# Patient Record
Sex: MALE | HISPANIC OR LATINO | Employment: FULL TIME | URBAN - METROPOLITAN AREA
[De-identification: names, ages, dates, MRNs, and addresses within clinical notes are randomized per-mention and may not be internally consistent; named-entity substitution may affect disease eponyms.]

---

## 2022-04-07 ENCOUNTER — OFFICE VISIT (OUTPATIENT)
Dept: FAMILY MEDICINE | Facility: CLINIC | Age: 57
End: 2022-04-07
Payer: COMMERCIAL

## 2022-04-07 VITALS
HEART RATE: 65 BPM | DIASTOLIC BLOOD PRESSURE: 98 MMHG | HEIGHT: 67 IN | WEIGHT: 255 LBS | BODY MASS INDEX: 40.02 KG/M2 | RESPIRATION RATE: 16 BRPM | SYSTOLIC BLOOD PRESSURE: 146 MMHG | OXYGEN SATURATION: 98 % | TEMPERATURE: 97 F

## 2022-04-07 DIAGNOSIS — E66.01 MORBID OBESITY (H): ICD-10-CM

## 2022-04-07 DIAGNOSIS — Z01.818 PREOPERATIVE EXAMINATION: ICD-10-CM

## 2022-04-07 DIAGNOSIS — Z13.6 CARDIOVASCULAR SCREENING; LDL GOAL LESS THAN 160: ICD-10-CM

## 2022-04-07 DIAGNOSIS — I10 BENIGN ESSENTIAL HYPERTENSION: ICD-10-CM

## 2022-04-07 DIAGNOSIS — E89.0 S/P COMPLETE THYROIDECTOMY: ICD-10-CM

## 2022-04-07 DIAGNOSIS — Z11.59 ENCOUNTER FOR HEPATITIS C SCREENING TEST FOR LOW RISK PATIENT: ICD-10-CM

## 2022-04-07 DIAGNOSIS — G47.33 OSA (OBSTRUCTIVE SLEEP APNEA): ICD-10-CM

## 2022-04-07 DIAGNOSIS — M17.32 POST-TRAUMATIC OSTEOARTHRITIS OF LEFT KNEE: Primary | ICD-10-CM

## 2022-04-07 PROBLEM — Z98.890 S/P COMPLETE THYROIDECTOMY: Status: ACTIVE | Noted: 2022-04-07

## 2022-04-07 PROBLEM — Z90.89 S/P COMPLETE THYROIDECTOMY: Status: ACTIVE | Noted: 2022-04-07

## 2022-04-07 LAB
ERYTHROCYTE [DISTWIDTH] IN BLOOD BY AUTOMATED COUNT: 14.4 % (ref 10–15)
HCT VFR BLD AUTO: 46.9 % (ref 40–53)
HGB BLD-MCNC: 14.8 G/DL (ref 13.3–17.7)
MCH RBC QN AUTO: 27 PG (ref 26.5–33)
MCHC RBC AUTO-ENTMCNC: 31.6 G/DL (ref 31.5–36.5)
MCV RBC AUTO: 86 FL (ref 78–100)
PLATELET # BLD AUTO: 163 10E3/UL (ref 150–450)
RBC # BLD AUTO: 5.48 10E6/UL (ref 4.4–5.9)
WBC # BLD AUTO: 7.6 10E3/UL (ref 4–11)

## 2022-04-07 PROCEDURE — 80061 LIPID PANEL: CPT | Performed by: FAMILY MEDICINE

## 2022-04-07 PROCEDURE — 84439 ASSAY OF FREE THYROXINE: CPT | Performed by: FAMILY MEDICINE

## 2022-04-07 PROCEDURE — 85027 COMPLETE CBC AUTOMATED: CPT | Performed by: FAMILY MEDICINE

## 2022-04-07 PROCEDURE — 84443 ASSAY THYROID STIM HORMONE: CPT | Performed by: FAMILY MEDICINE

## 2022-04-07 PROCEDURE — 80048 BASIC METABOLIC PNL TOTAL CA: CPT | Performed by: FAMILY MEDICINE

## 2022-04-07 PROCEDURE — 36415 COLL VENOUS BLD VENIPUNCTURE: CPT | Performed by: FAMILY MEDICINE

## 2022-04-07 PROCEDURE — 99204 OFFICE O/P NEW MOD 45 MIN: CPT | Performed by: FAMILY MEDICINE

## 2022-04-07 PROCEDURE — 86803 HEPATITIS C AB TEST: CPT | Performed by: FAMILY MEDICINE

## 2022-04-07 RX ORDER — CYCLOBENZAPRINE HCL 10 MG
10 TABLET ORAL PRN
COMMUNITY
Start: 2021-11-01 | End: 2022-04-07

## 2022-04-07 RX ORDER — LISINOPRIL 20 MG/1
20 TABLET ORAL DAILY
Qty: 30 TABLET | Refills: 1 | Status: SHIPPED | OUTPATIENT
Start: 2022-04-07 | End: 2022-04-15

## 2022-04-07 ASSESSMENT — PAIN SCALES - GENERAL: PAINLEVEL: EXTREME PAIN (8)

## 2022-04-07 NOTE — LETTER
April 11, 2022      Johan Cole  [address] [facility] [address]        Dear ,    We are writing to inform you of your test results.    Not surprisingly your thyroid levels are off.  When we see you back we can talk about restarting your medication.  Your cholesterol is also high enough that we should talk about that.     Let me know if you have any questions,        Resulted Orders   CBC with platelets   Result Value Ref Range    WBC Count 7.6 4.0 - 11.0 10e3/uL    RBC Count 5.48 4.40 - 5.90 10e6/uL    Hemoglobin 14.8 13.3 - 17.7 g/dL    Hematocrit 46.9 40.0 - 53.0 %    MCV 86 78 - 100 fL    MCH 27.0 26.5 - 33.0 pg    MCHC 31.6 31.5 - 36.5 g/dL    RDW 14.4 10.0 - 15.0 %    Platelet Count 163 150 - 450 10e3/uL   Basic metabolic panel  (Ca, Cl, CO2, Creat, Gluc, K, Na, BUN)   Result Value Ref Range    Sodium 141 133 - 144 mmol/L    Potassium 4.2 3.4 - 5.3 mmol/L    Chloride 115 (H) 94 - 109 mmol/L    Carbon Dioxide (CO2) 18 (L) 20 - 32 mmol/L    Anion Gap 8 3 - 14 mmol/L    Urea Nitrogen 16 7 - 30 mg/dL    Creatinine 0.87 0.66 - 1.25 mg/dL    Calcium 9.0 8.5 - 10.1 mg/dL    Glucose 111 (H) 70 - 99 mg/dL    GFR Estimate >90 >60 mL/min/1.73m2      Comment:      Effective December 21, 2021 eGFRcr in adults is calculated using the 2021 CKD-EPI creatinine equation which includes age and gender (Maddie et al., NEJM, DOI: 10.1056/VQNYiu2345124)   Lipid panel reflex to direct LDL Fasting   Result Value Ref Range    Cholesterol 295 (H) <200 mg/dL    Triglycerides 337 (H) <150 mg/dL    Direct Measure HDL 29 (L) >=40 mg/dL    LDL Cholesterol Calculated 199 (H) <=100 mg/dL    Non HDL Cholesterol 266 (H) <130 mg/dL    Patient Fasting > 8hrs? Yes     Narrative    Cholesterol  Desirable:  <200 mg/dL    Triglycerides  Normal:  Less than 150 mg/dL  Borderline High:  150-199 mg/dL  High:  200-499 mg/dL  Very High:  Greater than or equal to 500 mg/dL    Direct Measure HDL  Female:  Greater than or equal to 50 mg/dL   Male:   Greater than or equal to 40 mg/dL    LDL Cholesterol  Desirable:  <100mg/dL  Above Desirable:  100-129 mg/dL   Borderline High:  130-159 mg/dL   High:  160-189 mg/dL   Very High:  >= 190 mg/dL    Non HDL Cholesterol  Desirable:  130 mg/dL  Above Desirable:  130-159 mg/dL  Borderline High:  160-189 mg/dL  High:  190-219 mg/dL  Very High:  Greater than or equal to 220 mg/dL   Hepatitis C Screen Reflex to HCV RNA Quant and Genotype   Result Value Ref Range    Hepatitis C Antibody Nonreactive Nonreactive    Narrative    Assay performance characteristics have not been established for newborns, infants, and children.   TSH with free T4 reflex   Result Value Ref Range    TSH 26.47 (H) 0.40 - 4.00 mU/L   T4 free   Result Value Ref Range    Free T4 0.52 (L) 0.76 - 1.46 ng/dL       If you have any questions or concerns, please call the clinic at the number listed above.       Sincerely,      Ap Alvarez MD

## 2022-04-07 NOTE — PROGRESS NOTES
[facility]  [address] [facility] [address]  Phone: [phone]  Primary Provider: No Ref-Primary, Physician  Pre-op Performing Provider: AP ALVAREZ      PREOPERATIVE EVALUATION:  Today's date: 4/7/2022    Johan Cole is a 56 year old male who presents for a preoperative evaluation.    Surgical Information:  Surgery/Procedure: L knee replacement   Surgery Location: [facility]  Surgeon: Dr. Alex (Pt is not sure the spelling)   Surgery Date: 4/27/22  Time of Surgery: TBD  Where patient plans to recover: At home with family  Fax number for surgical facility:     Type of Anesthesia Anticipated: General    Assessment and Plan    (M17.32) Post-traumatic osteoarthritis of left knee  (primary encounter diagnosis)  Comment: cleared for surgery pending BP check  Plan:    ADDENDUM 4/18/21 - CLEARED FOR SURGERY     (Z13.6) CARDIOVASCULAR SCREENING; LDL GOAL LESS THAN 160  Comment: is fasting, establishing care  Plan: Lipid panel reflex to direct LDL Fasting            (Z11.59) Encounter for hepatitis C screening test for low risk patient  Comment:   Plan: Hepatitis C Screen Reflex to HCV RNA Quant and         Genotype            (Z01.818) Preoperative examination  Comment:   Plan: CBC with platelets, Basic metabolic panel  (Ca,        Cl, CO2, Creat, Gluc, K, Na, BUN)            (G47.33) DONA (obstructive sleep apnea)  Comment: noted for PL  Plan:     (E89.0) S/P complete thyroidectomy  Comment: PL updated, checking levels.  Will need to restart meds  Plan: TSH with free T4 reflex, T4 free            (I10) Benign essential hypertension  Comment: starting meds, f/u in 2w  Plan: lisinopril (ZESTRIL) 20 MG tablet            (E66.01) Morbid obesity (H)  Comment: noted for PL  Plan:       RTC in 2w    Ap Alvarez MD      Subjective     HPI related to upcoming procedure: Other the knee is feeling well.  No daily meds.  Feeling well other than his knee.  Is fasting.    Of  not, s/p thyroidectomy, has no tbeen on medication for thyroid in several years, no had his thyroid hormone levels checked.    Does also have history of HTN, had been started on medication, but again has not     Preop Questions 4/7/2022   1. Have you ever had a heart attack or stroke? No   2. Have you ever had surgery on your heart or blood vessels, such as a stent placement, a coronary artery bypass, or surgery on an artery in your head, neck, heart, or legs? No   3. Do you have chest pain with activity? No   4. Do you have a history of  heart failure? No   5. Do you currently have a cold, bronchitis or symptoms of other infection? No   6. Do you have a cough, shortness of breath, or wheezing? No   7. Do you or anyone in your family have previous history of blood clots? No   8. Do you or does anyone in your family have a serious bleeding problem such as prolonged bleeding following surgeries or cuts? No   9. Have you ever had problems with anemia or been told to take iron pills? No   10. Have you had any abnormal blood loss such as black, tarry or bloody stools? No   11. Have you ever had a blood transfusion? No   12. Are you willing to have a blood transfusion if it is medically needed before, during, or after your surgery? Yes   13. Have you or any of your relatives ever had problems with anesthesia? No   14. Do you have sleep apnea, excessive snoring or daytime drowsiness? YES -    14a. Do you have a CPAP machine? Yes   15. Do you have any artifical heart valves or other implanted medical devices like a pacemaker, defibrillator, or continuous glucose monitor? No   16. Do you have artificial joints? No   17. Are you allergic to latex? No       Health Care Directive:  Patient does not have a Health Care Directive or Living Will: Discussed advance care planning with patient; however, patient declined at this time.    Preoperative Review of :   reviewed - no record of controlled substances  prescribed.          Review of Systems  Constitutional, neuro, ENT, endocrine, pulmonary, cardiac, gastrointestinal, genitourinary, musculoskeletal, integument and psychiatric systems are negative, except as otherwise noted.    Patient Active Problem List    Diagnosis Date Noted     Post-traumatic osteoarthritis of left knee 04/07/2022     Priority: Medium     DONA (obstructive sleep apnea) 04/07/2022     Priority: Medium     S/P complete thyroidectomy 04/07/2022     Priority: Medium      History reviewed. No pertinent past medical history.  Past Surgical History:   Procedure Laterality Date     AS KNEE SCOPE,MED/LAT MENISCUS REPAIR       INGUINAL HERNIA REPAIR       THYROIDECTOMY   06/01/2005     TOTAL HIP ARTHROPLASTY  05/01/2015    Deosn't remember side     No current outpatient medications on file.       No Known Allergies     Social History     Tobacco Use     Smoking status: Never Smoker     Smokeless tobacco: Current User   Substance Use Topics     Alcohol use: Yes     Comment: Rare       History   Drug Use Unknown         Objective     BP (!) 146/98 (BP Location: Right arm, Patient Position: Sitting, Cuff Size: Adult Large)   Pulse 65   Temp 97  F (36.1  C) (Oral)   Resp 16   Wt 115.7 kg (255 lb)   SpO2 98%     Physical Exam  Constitutional:       General: He is not in acute distress.     Appearance: He is well-developed.   HENT:      Right Ear: Tympanic membrane and external ear normal. There is impacted cerumen.      Left Ear: Tympanic membrane and external ear normal. There is impacted cerumen.      Nose: Nose normal.      Mouth/Throat:      Mouth: No oral lesions.      Pharynx: No oropharyngeal exudate.   Eyes:      General:         Right eye: No discharge.         Left eye: No discharge.      Conjunctiva/sclera: Conjunctivae normal.      Pupils: Pupils are equal, round, and reactive to light.   Neck:      Thyroid: No thyromegaly.      Trachea: No tracheal deviation.   Cardiovascular:      Rate and  Rhythm: Normal rate and regular rhythm.      Pulses: Normal pulses.      Heart sounds: Normal heart sounds, S1 normal and S2 normal. No murmur heard.    No S3 or S4 sounds.   Pulmonary:      Effort: Pulmonary effort is normal. No respiratory distress.      Breath sounds: Normal breath sounds. No wheezing or rales.   Abdominal:      General: Bowel sounds are normal.      Palpations: Abdomen is soft. There is no mass.      Tenderness: There is no abdominal tenderness.   Musculoskeletal:         General: No deformity. Normal range of motion.      Cervical back: Neck supple.   Lymphadenopathy:      Cervical: No cervical adenopathy.   Skin:     General: Skin is warm and dry.      Findings: No lesion or rash.   Neurological:      Mental Status: He is alert and oriented to person, place, and time.      Motor: No abnormal muscle tone.      Deep Tendon Reflexes: Reflexes are normal and symmetric.   Psychiatric:         Speech: Speech normal.         Thought Content: Thought content normal.         Judgment: Judgment normal.           No results for input(s): HGB, PLT, INR, NA, POTASSIUM, CR, A1C in the last 04766 hours.     Diagnostics:  Labs pending at this time.  Results will be reviewed when available.   No EKG required, no history of coronary heart disease, significant arrhythmia, peripheral arterial disease or other structural heart disease.    Revised Cardiac Risk Index (RCRI):  The patient has the following serious cardiovascular risks for perioperative complications:   - No serious cardiac risks = 0 points     RCRI Interpretation: 0 points: Class I (very low risk - 0.4% complication rate)           Signed Electronically by: Ap Alvarez MD  Copy of this evaluation report is provided to requesting physician.

## 2022-04-08 LAB
ANION GAP SERPL CALCULATED.3IONS-SCNC: 8 MMOL/L (ref 3–14)
BUN SERPL-MCNC: 16 MG/DL (ref 7–30)
CALCIUM SERPL-MCNC: 9 MG/DL (ref 8.5–10.1)
CHLORIDE BLD-SCNC: 115 MMOL/L (ref 94–109)
CHOLEST SERPL-MCNC: 295 MG/DL
CO2 SERPL-SCNC: 18 MMOL/L (ref 20–32)
CREAT SERPL-MCNC: 0.87 MG/DL (ref 0.66–1.25)
FASTING STATUS PATIENT QL REPORTED: YES
GFR SERPL CREATININE-BSD FRML MDRD: >90 ML/MIN/1.73M2
GLUCOSE BLD-MCNC: 111 MG/DL (ref 70–99)
HCV AB SERPL QL IA: NONREACTIVE
HDLC SERPL-MCNC: 29 MG/DL
LDLC SERPL CALC-MCNC: 199 MG/DL
NONHDLC SERPL-MCNC: 266 MG/DL
POTASSIUM BLD-SCNC: 4.2 MMOL/L (ref 3.4–5.3)
SODIUM SERPL-SCNC: 141 MMOL/L (ref 133–144)
T4 FREE SERPL-MCNC: 0.52 NG/DL (ref 0.76–1.46)
TRIGL SERPL-MCNC: 337 MG/DL
TSH SERPL DL<=0.005 MIU/L-ACNC: 26.47 MU/L (ref 0.4–4)

## 2022-04-14 ENCOUNTER — TELEPHONE (OUTPATIENT)
Dept: FAMILY MEDICINE | Facility: CLINIC | Age: 57
End: 2022-04-14
Payer: COMMERCIAL

## 2022-04-14 NOTE — TELEPHONE ENCOUNTER
Reason for Call:  Same Day Appointment, Requested Provider:  anyone    PCP: Ap Alvarez    Reason for visit: Needs pre-op for procedure 4/27/22 - has appt set for 4/25/22 but doesn't have a ride anymore and needs to be seen before 4/27/22    Duration of symptoms: n/a    Have you been treated for this in the past? Yes    Additional comments: n/a    Can we leave a detailed message on this number? YES    Phone number patient can be reached at: Home number on file [phone] (home)    Best Time: anytime    Call taken on 4/14/2022 at 10:29 AM by Jennifer Willis

## 2022-04-15 ENCOUNTER — ANCILLARY PROCEDURE (OUTPATIENT)
Dept: GENERAL RADIOLOGY | Facility: CLINIC | Age: 57
End: 2022-04-15
Attending: FAMILY MEDICINE
Payer: COMMERCIAL

## 2022-04-15 ENCOUNTER — OFFICE VISIT (OUTPATIENT)
Dept: FAMILY MEDICINE | Facility: CLINIC | Age: 57
End: 2022-04-15

## 2022-04-15 VITALS
TEMPERATURE: 97 F | OXYGEN SATURATION: 97 % | RESPIRATION RATE: 16 BRPM | HEART RATE: 60 BPM | SYSTOLIC BLOOD PRESSURE: 126 MMHG | DIASTOLIC BLOOD PRESSURE: 84 MMHG

## 2022-04-15 DIAGNOSIS — M25.551 HIP PAIN, RIGHT: Primary | ICD-10-CM

## 2022-04-15 DIAGNOSIS — S14.3XXD INJURY OF BRACHIAL PLEXUS, SUBSEQUENT ENCOUNTER: ICD-10-CM

## 2022-04-15 DIAGNOSIS — Z12.11 SCREEN FOR COLON CANCER: ICD-10-CM

## 2022-04-15 DIAGNOSIS — E89.0 S/P COMPLETE THYROIDECTOMY: ICD-10-CM

## 2022-04-15 DIAGNOSIS — M25.551 HIP PAIN, RIGHT: ICD-10-CM

## 2022-04-15 DIAGNOSIS — Z01.818 PREOP GENERAL PHYSICAL EXAM: ICD-10-CM

## 2022-04-15 DIAGNOSIS — I10 BENIGN ESSENTIAL HYPERTENSION: ICD-10-CM

## 2022-04-15 PROCEDURE — 73502 X-RAY EXAM HIP UNI 2-3 VIEWS: CPT | Mod: FY | Performed by: RADIOLOGY

## 2022-04-15 PROCEDURE — 99214 OFFICE O/P EST MOD 30 MIN: CPT | Performed by: FAMILY MEDICINE

## 2022-04-15 RX ORDER — LISINOPRIL 20 MG/1
20 TABLET ORAL DAILY
Qty: 90 TABLET | Refills: 1 | Status: SHIPPED | OUTPATIENT
Start: 2022-04-15 | End: 2022-07-19

## 2022-04-15 RX ORDER — LEVOTHYROXINE SODIUM 100 UG/1
100 TABLET ORAL DAILY
Qty: 90 TABLET | Refills: 1 | Status: SHIPPED | OUTPATIENT
Start: 2022-04-15 | End: 2022-07-19

## 2022-04-15 ASSESSMENT — ENCOUNTER SYMPTOMS
CONSTITUTIONAL NEGATIVE: 1
DOUBLE VISION: 0
HEADACHES: 0
PALPITATIONS: 0
SHORTNESS OF BREATH: 0
BLURRED VISION: 0

## 2022-04-15 ASSESSMENT — PAIN SCALES - GENERAL: PAINLEVEL: EXTREME PAIN (9)

## 2022-04-15 NOTE — PROGRESS NOTES
[facility]  [address] [facility] [address]  Phone: [phone]  Primary Provider: Ap Alvarez  {FV AMB Performing Provider (Optional):500759}    {Provider  Link to PREOP SmartSet  Use this to apply standard patient instructions to AVS; includes medication directions, common orders, guidelines for anemia, warfarin, additional testing   :723503}  PREOPERATIVE EVALUATION:  Today's date: 4/15/2022    Johan Cole is a 56 year old male who presents for a preoperative evaluation.    Surgical Information:  Surgery/Procedure: Right knee replacement   Surgery Location: [facility]   Surgeon: Dr Doan   Surgery Date: 4/27  Time of Surgery: TBD   Where patient plans to recover: At home with family  Fax number for surgical facility: Note does not need to be faxed, will be available electronically in Epic.    Type of Anesthesia Anticipated: General    {2021 Provider Charting Preference for Preop :040200}    Subjective     HPI related to upcoming procedure: ***      Preop Questions 4/15/2022   1. Have you ever had a heart attack or stroke? No   2. Have you ever had surgery on your heart or blood vessels, such as a stent placement, a coronary artery bypass, or surgery on an artery in your head, neck, heart, or legs? No   3. Do you have chest pain with activity? No   4. Do you have a history of  heart failure? No   5. Do you currently have a cold, bronchitis or symptoms of other infection? No   6. Do you have a cough, shortness of breath, or wheezing? No   7. Do you or anyone in your family have previous history of blood clots? No   8. Do you or does anyone in your family have a serious bleeding problem such as prolonged bleeding following surgeries or cuts? No   9. Have you ever had problems with anemia or been told to take iron pills? No   10. Have you had any abnormal blood loss such as black, tarry or bloody stools? No   11. Have you ever had a blood transfusion? No   12.  Are you willing to have a blood transfusion if it is medically needed before, during, or after your surgery? Yes   13. Have you or any of your relatives ever had problems with anesthesia? No   14. Do you have sleep apnea, excessive snoring or daytime drowsiness? YES - ***   14a. Do you have a CPAP machine? Yes   15. Do you have any artifical heart valves or other implanted medical devices like a pacemaker, defibrillator, or continuous glucose monitor? No   16. Do you have artificial joints? No   17. Are you allergic to latex? No     Health Care Directive:  Patient does not have a Health Care Directive or Living Will: {ADVANCE_DIRECTIVE_STATUS:533396}    Preoperative Review of :  {Mnpmpreport:196182}  {Review MNPMP for all patients per ICSI MNPMP Profile:985681}    {Chronic problem details (Optional) :447012}    Review of Systems  {ROS Preop Choices:412503}    Patient Active Problem List    Diagnosis Date Noted     Post-traumatic osteoarthritis of left knee 04/07/2022     Priority: Medium     DONA (obstructive sleep apnea) 04/07/2022     Priority: Medium     S/P complete thyroidectomy 04/07/2022     Priority: Medium     Benign essential hypertension 04/07/2022     Priority: Medium     Morbid obesity (H) 04/07/2022     Priority: Medium      No past medical history on file.  Past Surgical History:   Procedure Laterality Date     AS KNEE SCOPE,MED/LAT MENISCUS REPAIR       INGUINAL HERNIA REPAIR       THYROIDECTOMY   06/01/2005     TOTAL HIP ARTHROPLASTY  05/01/2015    Deosn't remember side     Current Outpatient Medications   Medication Sig Dispense Refill     lisinopril (ZESTRIL) 20 MG tablet Take 1 tablet (20 mg) by mouth daily 30 tablet 1       No Known Allergies     Social History     Tobacco Use     Smoking status: Never Smoker     Smokeless tobacco: Current User   Substance Use Topics     Alcohol use: Yes     Comment: Rare     {FAMILY HISTORY (Optional):138344057}  History   Drug Use Unknown         Objective  "    There were no vitals taken for this visit.    Physical Exam  {EXAM Preop Choices:249149}    Recent Labs   Lab Test 22  1419   HGB 14.8         POTASSIUM 4.2   CR 0.87        Diagnostics:  {LABS:131683}   {EK}    Revised Cardiac Risk Index (RCRI):  The patient has the following serious cardiovascular risks for perioperative complications:  {PREOP REVISED CARDIAC RISK INDEX (RCRI) :317568::\" - No serious cardiac risks = 0 points\"}     RCRI Interpretation: {REVISED CARDIAC RISK INTERPRETATION :636841}         Signed Electronically by: Ap Alvarez MD  Copy of this evaluation report is provided to requesting physician.    {Provider Resources  Preop [facility] Preop Guidelines  Revised Cardiac Risk Index :351789}  "

## 2022-04-15 NOTE — PROGRESS NOTES
HPI  Now having R hip pain getting repidly worse in the last week.  Hurts too much to drive.  No event or accident that he links to this starting.  Feels similar to when he had it replaced in the first place.  Unable to stand easily and feels like we walks crooked.  Pain in anterior hip radiating towards groin.    Denies CP, palpitations, edema, dyspnea, HA, vision changes.  No issues with lisinopril, no side effects, is pleased with readings      Noting new hand weakness in the last two days.  Can't close hand or , unable to hold fork.  Links to fall in the shower where he grabbed the sides of the tub.  Was seen locally at  in [place] and told he had injured both shoulders and probably injured the nerves to his and and that they would recover in about a week.      Review of Systems   Constitutional: Negative.    Eyes: Negative for blurred vision and double vision.   Respiratory: Negative for shortness of breath.    Cardiovascular: Negative for chest pain and palpitations.   Musculoskeletal: Positive for joint pain.   Neurological: Negative for headaches.         Physical Exam  Vitals reviewed.   Eyes:      Conjunctiva/sclera: Conjunctivae normal.   Cardiovascular:      Rate and Rhythm: Normal rate and regular rhythm.      Heart sounds: Normal heart sounds.   Pulmonary:      Effort: Pulmonary effort is normal.      Breath sounds: Normal breath sounds.   Musculoskeletal:      Left hip: Tenderness present. No crepitus. Decreased range of motion. Decreased strength.   Skin:     General: Skin is warm and dry.   Neurological:      Mental Status: He is alert and oriented to person, place, and time.       Assessment and Plan    (M25.081) Hip pain, right  (primary encounter diagnosis)  Comment: normal XR, advised to f/u with [facility] ASAP  Plan: XR Hip Right 2-3 Views            (Z01.818) Preop general physical exam  Comment: will addend previous preop  Plan:     (Z12.11) Screen for colon cancer  Comment:   Plan:      (I10) Benign essential hypertension  Comment: much better!  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

## 2022-04-19 ENCOUNTER — TRANSFERRED RECORDS (OUTPATIENT)
Dept: HEALTH INFORMATION MANAGEMENT | Facility: CLINIC | Age: 57
End: 2022-04-19
Payer: COMMERCIAL

## 2022-04-20 ENCOUNTER — TELEPHONE (OUTPATIENT)
Dept: FAMILY MEDICINE | Facility: CLINIC | Age: 57
End: 2022-04-20
Payer: COMMERCIAL

## 2022-04-20 DIAGNOSIS — F40.240 CLAUSTROPHOBIA: Primary | ICD-10-CM

## 2022-04-20 NOTE — TELEPHONE ENCOUNTER
Reason for Call:  Other prescription    Detailed comments: patient is having MRI 04/29/22 @ [facility] in [place] and is asking for a medication due to he is claustrophobic in small places.     Phone Number Patient can be reached at: Home number on file [phone] (home)    Best Time: any    Can we leave a detailed message on this number? YES    Call taken on 4/20/2022 at 10:16 AM by Shiresa H. Ormond

## 2022-04-22 RX ORDER — LORAZEPAM 0.5 MG/1
0.5 TABLET ORAL ONCE
Qty: 1 TABLET | Refills: 0 | Status: SHIPPED | OUTPATIENT
Start: 2022-04-22 | End: 2022-04-22

## 2022-05-05 ENCOUNTER — LAB REQUISITION (OUTPATIENT)
Dept: LAB | Facility: CLINIC | Age: 57
End: 2022-05-05
Payer: COMMERCIAL

## 2022-05-05 ENCOUNTER — TRANSFERRED RECORDS (OUTPATIENT)
Dept: HEALTH INFORMATION MANAGEMENT | Facility: CLINIC | Age: 57
End: 2022-05-05

## 2022-05-05 DIAGNOSIS — M17.10 UNILATERAL PRIMARY OSTEOARTHRITIS, UNSPECIFIED KNEE: ICD-10-CM

## 2022-05-05 LAB
APPEARANCE FLD: ABNORMAL
BASOPHILS NFR FLD MANUAL: 1 %
CELL COUNT BODY FLUID SOURCE: ABNORMAL
COLOR FLD: ABNORMAL
CRYSTALS SNV MICRO: NORMAL
EOSINOPHIL NFR FLD MANUAL: 4 %
GRAM STAIN RESULT: NORMAL
GRAM STAIN RESULT: NORMAL
LYMPHOCYTES NFR FLD MANUAL: 12 %
MONOS+MACROS NFR FLD MANUAL: 3 %
NEUTS BAND NFR FLD MANUAL: 81 %
RBC # FLD: ABNORMAL /UL
WBC # FLD AUTO: 853 /UL

## 2022-05-05 PROCEDURE — 87075 CULTR BACTERIA EXCEPT BLOOD: CPT | Mod: ORL | Performed by: PHYSICIAN ASSISTANT

## 2022-05-05 PROCEDURE — 89051 BODY FLUID CELL COUNT: CPT | Mod: ORL | Performed by: PHYSICIAN ASSISTANT

## 2022-05-05 PROCEDURE — 89060 EXAM SYNOVIAL FLUID CRYSTALS: CPT | Mod: TC,ORL | Performed by: PHYSICIAN ASSISTANT

## 2022-05-05 PROCEDURE — 87205 SMEAR GRAM STAIN: CPT | Mod: ORL | Performed by: PHYSICIAN ASSISTANT

## 2022-05-05 PROCEDURE — 87070 CULTURE OTHR SPECIMN AEROBIC: CPT | Mod: ORL | Performed by: PHYSICIAN ASSISTANT

## 2022-05-10 LAB — BACTERIA SNV CULT: NO GROWTH

## 2022-05-11 ENCOUNTER — TRANSFERRED RECORDS (OUTPATIENT)
Dept: HEALTH INFORMATION MANAGEMENT | Facility: CLINIC | Age: 57
End: 2022-05-11
Payer: COMMERCIAL

## 2022-05-19 LAB — BACTERIA SNV CULT: NORMAL

## 2022-05-26 ENCOUNTER — TELEPHONE (OUTPATIENT)
Dept: FAMILY MEDICINE | Facility: CLINIC | Age: 57
End: 2022-05-26

## 2022-05-26 NOTE — TELEPHONE ENCOUNTER
Received Short Term Disabilitiy paperwork from [facility].    Patient needs to schedule either video or in clinic appointment to discuss.    Forms are in the [facility] TC basket for appt.

## 2022-05-27 ENCOUNTER — TELEPHONE (OUTPATIENT)
Dept: FAMILY MEDICINE | Facility: CLINIC | Age: 57
End: 2022-05-27
Payer: COMMERCIAL

## 2022-05-27 NOTE — TELEPHONE ENCOUNTER
Reason for Call:  Other appointment    Detailed comments: Patient needs an appointment with Dr. Alvarez to fill out [place] paperwork.  Can he be squeezed into to Dr. Rocha schedule.  The patient would like to be seen as soon as he can.    Phone Number Patient can be reached at: Home number on file [phone] (home)    Best Time: anytime    Can we leave a detailed message on this number? YES    Call taken on 5/27/2022 at 10:35 AM by Whitley Nugent

## 2022-06-30 ENCOUNTER — TRANSFERRED RECORDS (OUTPATIENT)
Dept: HEALTH INFORMATION MANAGEMENT | Facility: CLINIC | Age: 57
End: 2022-06-30

## 2022-07-19 ENCOUNTER — OFFICE VISIT (OUTPATIENT)
Dept: FAMILY MEDICINE | Facility: CLINIC | Age: 57
End: 2022-07-19
Payer: COMMERCIAL

## 2022-07-19 VITALS
HEIGHT: 67 IN | WEIGHT: 254 LBS | SYSTOLIC BLOOD PRESSURE: 164 MMHG | RESPIRATION RATE: 16 BRPM | OXYGEN SATURATION: 97 % | BODY MASS INDEX: 39.87 KG/M2 | DIASTOLIC BLOOD PRESSURE: 116 MMHG | HEART RATE: 79 BPM | TEMPERATURE: 97 F

## 2022-07-19 DIAGNOSIS — Z12.11 SCREEN FOR COLON CANCER: ICD-10-CM

## 2022-07-19 DIAGNOSIS — E89.0 S/P COMPLETE THYROIDECTOMY: ICD-10-CM

## 2022-07-19 DIAGNOSIS — I10 BENIGN ESSENTIAL HYPERTENSION: Primary | ICD-10-CM

## 2022-07-19 PROCEDURE — 99214 OFFICE O/P EST MOD 30 MIN: CPT | Performed by: FAMILY MEDICINE

## 2022-07-19 RX ORDER — LEVOTHYROXINE SODIUM 100 UG/1
100 TABLET ORAL DAILY
Qty: 90 TABLET | Refills: 1 | Status: SHIPPED | OUTPATIENT
Start: 2022-07-19 | End: 2023-05-22

## 2022-07-19 RX ORDER — OLMESARTAN MEDOXOMIL 20 MG/1
20 TABLET ORAL DAILY
Qty: 30 TABLET | Refills: 1 | Status: SHIPPED | OUTPATIENT
Start: 2022-07-19 | End: 2022-08-16

## 2022-07-19 ASSESSMENT — ENCOUNTER SYMPTOMS
HEADACHES: 0
SHORTNESS OF BREATH: 0
DIZZINESS: 1
CONSTITUTIONAL NEGATIVE: 1
PALPITATIONS: 0

## 2022-07-19 ASSESSMENT — PAIN SCALES - GENERAL: PAINLEVEL: NO PAIN (0)

## 2022-07-19 NOTE — PROGRESS NOTES
"  Assessment and Plan    (I10) Benign essential hypertension  (primary encounter diagnosis)  Comment: switching to olmesartan  Plan: olmesartan (BENICAR) 20 MG tablet        (E89.0) S/P complete thyroidectomy  Comment: has not been using, will restart, retestin 6-8w  Plan: levothyroxine (SYNTHROID/LEVOTHROID) 100 MCG         tablet              RTC in 2w for BP check      Ap Alvarez MD      Benita Prado is a 56 year old presenting for the following health issues:  Hypertension      History of Present Illness       Reason for visit:  Checking my high blood pressure ran other meds I need    He eats 2-3 servings of fruits and vegetables daily.He consumes 1 sweetened beverage(s) daily.He exercises with enough effort to increase his heart rate 9 or less minutes per day.  He exercises with enough effort to increase his heart rate 3 or less days per week.   He is taking medications regularly.       Hypertension Follow-up      Do you check your blood pressure regularly outside of the clinic? Yes     Are you following a low salt diet? Yes    Are your blood pressures ever more than 140 on the top number (systolic) OR more   than 90 on the bottom number (diastolic), for example 140/90? Yes    PATIENT HAS NOT BEEN TAKING HIS LEVOTHYROXINE AND HAS BEEN FEELING \"UNBALNCED\" HE ALSO STATES HE NEEDS TO GET HIS BP UNDER CONTROL SO HE CAN GET HIS INJECTIONS FOR HIS SCIATIC NERVE     \"I feel like I have vertigo\" - feeling off balance, but denies sensation of room spinning or diziness with position changes.  Feels like he's leaning when he walks.    Denies CP, palpitations, edema, dyspnea, HA, vision changes.      Review of Systems   Constitutional: Negative.    Eyes: Negative for visual disturbance.   Respiratory: Negative for shortness of breath.    Cardiovascular: Negative for chest pain, palpitations and peripheral edema.   Neurological: Positive for dizziness. Negative for headaches.            Objective    BP (!) 164/116  " " Pulse 79   Temp 97  F (36.1  C) (Oral)   Resp 16   Ht 1.702 m (5' 7\")   Wt 115.2 kg (254 lb)   SpO2 97%   BMI 39.78 kg/m    Body mass index is 39.78 kg/m .  Physical Exam  Vitals and nursing note reviewed.   HENT:      Head: Normocephalic.   Eyes:      Conjunctiva/sclera: Conjunctivae normal.   Cardiovascular:      Rate and Rhythm: Normal rate and regular rhythm.      Heart sounds: Normal heart sounds.   Pulmonary:      Effort: Pulmonary effort is normal.      Breath sounds: Normal breath sounds.   Skin:     General: Skin is warm and dry.   Neurological:      General: No focal deficit present.      Mental Status: He is alert and oriented to person, place, and time.   Psychiatric:         Mood and Affect: Mood normal.         Behavior: Behavior normal.                          .  ..  "

## 2022-07-26 ENCOUNTER — TRANSFERRED RECORDS (OUTPATIENT)
Dept: HEALTH INFORMATION MANAGEMENT | Facility: CLINIC | Age: 57
End: 2022-07-26

## 2022-08-04 ENCOUNTER — OFFICE VISIT (OUTPATIENT)
Dept: FAMILY MEDICINE | Facility: CLINIC | Age: 57
End: 2022-08-04
Payer: COMMERCIAL

## 2022-08-04 VITALS
TEMPERATURE: 98.4 F | BODY MASS INDEX: 39.87 KG/M2 | OXYGEN SATURATION: 97 % | RESPIRATION RATE: 14 BRPM | WEIGHT: 254 LBS | HEART RATE: 78 BPM | HEIGHT: 67 IN | SYSTOLIC BLOOD PRESSURE: 158 MMHG | DIASTOLIC BLOOD PRESSURE: 104 MMHG

## 2022-08-04 DIAGNOSIS — R25.1 TREMOR OF RIGHT HAND: ICD-10-CM

## 2022-08-04 DIAGNOSIS — Z12.11 SCREEN FOR COLON CANCER: ICD-10-CM

## 2022-08-04 DIAGNOSIS — I10 BENIGN ESSENTIAL HYPERTENSION: Primary | ICD-10-CM

## 2022-08-04 PROCEDURE — 99213 OFFICE O/P EST LOW 20 MIN: CPT | Mod: 25 | Performed by: FAMILY MEDICINE

## 2022-08-04 PROCEDURE — 91306 COVID-19,PF,MODERNA (18+ YRS BOOSTER .25ML): CPT | Performed by: FAMILY MEDICINE

## 2022-08-04 PROCEDURE — 90471 IMMUNIZATION ADMIN: CPT | Performed by: FAMILY MEDICINE

## 2022-08-04 PROCEDURE — 0064A COVID-19,PF,MODERNA (18+ YRS BOOSTER .25ML): CPT | Performed by: FAMILY MEDICINE

## 2022-08-04 PROCEDURE — 90715 TDAP VACCINE 7 YRS/> IM: CPT | Performed by: FAMILY MEDICINE

## 2022-08-04 ASSESSMENT — ENCOUNTER SYMPTOMS
TREMORS: 1
CONSTITUTIONAL NEGATIVE: 1
HEADACHES: 0
PALPITATIONS: 0
NUMBNESS: 1
SHORTNESS OF BREATH: 0

## 2022-08-04 ASSESSMENT — PAIN SCALES - GENERAL: PAINLEVEL: NO PAIN (0)

## 2022-08-04 NOTE — PROGRESS NOTES
Assessment and Plan    (I10) Benign essential hypertension  (primary encounter diagnosis)  Comment: has not yet started medication, will start tomorrow, will arrange for recheck  Plan: COVID-19,PF,MODERNA (18+ YRS BOOSTER .25ML)            (Z12.11) Screen for colon cancer  Comment:   Plan: Fecal colorectal cancer screen (FIT)            (R25.1) Tremor of right hand  Comment: since fall and neck injury, referral to neuro pending result  Plan: MR Brachial Plexus Right wo & w Contrast              RTC in 2w for BP recheck    Ap Alvarez MD      Benita Prado is a 56 year old, presenting for the following health issues:  Hypertension      History of Present Illness       Reason for visit:  Follow up    He eats 2-3 servings of fruits and vegetables daily.He consumes 1 sweetened beverage(s) daily.He exercises with enough effort to increase his heart rate 9 or less minutes per day.  He exercises with enough effort to increase his heart rate 3 or less days per week.   He is taking medications regularly.       Hypertension Follow-up      Do you check your blood pressure regularly outside of the clinic? No     Are you following a low salt diet? No    Are your blood pressures ever more than 140 on the top number (systolic) OR more   than 90 on the bottom number (diastolic), for example 140/90? No      Noting tremor in R hand since being seen last.  Notes persistent feeling of numbness in this hand for 2-3 months.   Notes that he fell in the shower about 1m ago, caught himself with his R hand and really wrenched his r arm.    Did not start olmasartan yet, is still taking lisinopril.  Did start levothyroxine.  Denies change in bowel habits, palpitations, tremor other than noted in R hand, heat intolerance.    Review of Systems   Constitutional: Negative.    Eyes: Negative for visual disturbance.   Respiratory: Negative for shortness of breath.    Cardiovascular: Negative for chest pain, palpitations and peripheral edema.  "  Neurological: Positive for tremors and numbness. Negative for headaches.            Objective    BP (!) 158/104 (BP Location: Right arm, Patient Position: Sitting, Cuff Size: Adult Large)   Pulse 78   Temp 98.4  F (36.9  C) (Oral)   Resp 14   Ht 1.702 m (5' 7\")   Wt 115.2 kg (254 lb)   SpO2 97%   BMI 39.78 kg/m    Body mass index is 39.78 kg/m .  Physical Exam  Vitals and nursing note reviewed.   Constitutional:       Appearance: Normal appearance.   Skin:     General: Skin is warm and dry.   Neurological:      General: No focal deficit present.      Mental Status: He is alert and oriented to person, place, and time.      Deep Tendon Reflexes:      Reflex Scores:       Tricep reflexes are 2+ on the right side.       Bicep reflexes are 2+ on the right side.       Brachioradialis reflexes are 2+ on the right side.     Comments: Hand , finger abduction, thumb opposition str 5/5.  No winging of scapulae.     Psychiatric:         Mood and Affect: Mood normal.         Behavior: Behavior normal.                            .  ..  "

## 2022-08-10 ENCOUNTER — TRANSFERRED RECORDS (OUTPATIENT)
Dept: HEALTH INFORMATION MANAGEMENT | Facility: CLINIC | Age: 57
End: 2022-08-10

## 2022-08-16 ENCOUNTER — TELEPHONE (OUTPATIENT)
Dept: FAMILY MEDICINE | Facility: CLINIC | Age: 57
End: 2022-08-16

## 2022-08-16 ENCOUNTER — OFFICE VISIT (OUTPATIENT)
Dept: FAMILY MEDICINE | Facility: CLINIC | Age: 57
End: 2022-08-16
Payer: COMMERCIAL

## 2022-08-16 VITALS
OXYGEN SATURATION: 96 % | SYSTOLIC BLOOD PRESSURE: 178 MMHG | TEMPERATURE: 98.1 F | WEIGHT: 248.6 LBS | BODY MASS INDEX: 41.42 KG/M2 | HEIGHT: 65 IN | RESPIRATION RATE: 16 BRPM | HEART RATE: 84 BPM | DIASTOLIC BLOOD PRESSURE: 108 MMHG

## 2022-08-16 DIAGNOSIS — I10 BENIGN ESSENTIAL HYPERTENSION: ICD-10-CM

## 2022-08-16 DIAGNOSIS — Z01.818 PREOP GENERAL PHYSICAL EXAM: Primary | ICD-10-CM

## 2022-08-16 PROCEDURE — 99214 OFFICE O/P EST MOD 30 MIN: CPT | Performed by: FAMILY MEDICINE

## 2022-08-16 RX ORDER — LISINOPRIL 20 MG/1
20 TABLET ORAL DAILY
Qty: 30 TABLET | Refills: 1 | Status: SHIPPED | OUTPATIENT
Start: 2022-08-16 | End: 2022-09-15

## 2022-08-16 RX ORDER — CHLORTHALIDONE 25 MG/1
25 TABLET ORAL DAILY
Qty: 30 TABLET | Refills: 1 | Status: SHIPPED | OUTPATIENT
Start: 2022-08-16 | End: 2022-11-03

## 2022-08-16 NOTE — TELEPHONE ENCOUNTER
Pt calling to report he has been on Lisinopril 20 mg which is what you ordered today. He did not  the Benicar 20 mg due to cost and just continued the lisinopril 20 mg. He did just  the prescription and that is when he realized he was already taking it.     Will huddle w/ dr. Alvarez and get back to him    Phylicia WILKES RN       Huddled w/ Dr. Alvarez going to add another medication.     Call to pt to inform to take lisinopril 20 mg AND chlorthalidone 25 mg f/up as planned.     Pt verbalized understanding.     Phylicia WILKES RN

## 2022-08-16 NOTE — PATIENT INSTRUCTIONS
Preparing for Your Surgery  Getting started  A nurse will call you to review your health history and instructions. They will give you an arrival time based on your scheduled surgery time. Please be ready to share:    Your doctor's clinic name and phone number    Your medical, surgical and anesthesia history    A list of allergies and sensitivities    A list of medicines, including herbal treatments and over-the-counter drugs    Whether the patient has a legal guardian (ask how to send us the papers in advance)  Please tell us if you're pregnant--or if there's any chance you might be pregnant. Some surgeries may injure a fetus (unborn baby), so they require a pregnancy test. Surgeries that are safe for a fetus don't always need a test, and you can choose whether to have one.   If you have a child who's having surgery, please ask for a copy of Preparing for Your Child's Surgery.    Preparing for surgery    Within 30 days of surgery: Have a pre-op exam (sometimes called an H&P, or History and Physical). This can be done at a clinic or pre-operative center.  ? If you're having a , you may not need this exam. Talk to your care team.    At your pre-op exam, talk to your care team about all medicines you take. If you need to stop any medicines before surgery, ask when to start taking them again.  ? We do this for your safety. Many medicines can make you bleed too much during surgery. Some change how well surgery (anesthesia) drugs work.    Call your insurance company to let them know you're having surgery. (If you don't have insurance, call [phone].)    Call your clinic if there's any change in your health. This includes signs of a cold or flu (sore throat, runny nose, cough, rash, fever). It also includes a scrape or scratch near the surgery site.    If you have questions on the day of surgery, call your hospital or surgery center.  COVID testing  You may need to be tested for COVID-19 before having  surgery. If so, we will give you instructions.  Eating and drinking guidelines  For your safety: Unless your surgeon tells you otherwise, follow the guidelines below.    Eat and drink as usual until 8 hours before surgery. After that, no food or milk.    Drink clear liquids until 2 hours before surgery. These are liquids you can see through, like water, Gatorade and Propel Water. You may also have black coffee and tea (no cream or milk).    Nothing by mouth within 2 hours of surgery. This includes gum, candy and breath mints.    If you drink alcohol: Stop drinking it the night before surgery.    If your care team tells you to take medicine on the morning of surgery, it's okay to take it with a sip of water.  Preventing infection    Shower or bathe the night before and morning of your surgery. Follow the instructions your clinic gave you. (If no instructions, use regular soap.)    Don't shave or clip hair near your surgery site. We'll remove the hair if needed.    Don't smoke or vape the morning of surgery. You may chew nicotine gum up to 2 hours before surgery. A nicotine patch is okay.  ? Note: Some surgeries require you to completely quit smoking and nicotine. Check with your surgeon.    Your care team will make every effort to keep you safe from infection. We will:  ? Clean our hands often with soap and water (or an alcohol-based hand rub).  ? Clean the skin at your surgery site with a special soap that kills germs.  ? Give you a special gown to keep you warm. (Cold raises the risk of infection.)  ? Wear special hair covers, masks, gowns and gloves during surgery.  ? Give antibiotic medicine, if prescribed. Not all surgeries need antibiotics.  What to bring on the day of surgery    Photo ID and insurance card    Copy of your health care directive, if you have one    Glasses and hearing aides (bring cases)  ? You can't wear contacts during surgery    Inhaler and eye drops, if you use them (tell us about these when  you arrive)    CPAP machine or breathing device, if you use them    A few personal items, if spending the night    If you have . . .  ? A pacemaker, ICD (cardiac defibrillator) or other implant: Bring the ID card.  ? An implanted stimulator: Bring the remote control.  ? A legal guardian: Bring a copy of the certified (court-stamped) guardianship papers.  Please remove any jewelry, including body piercings. Leave jewelry and other valuables at home.  If you're going home the day of surgery    You must have a responsible adult drive you home. They should stay with you overnight as well.    If you don't have someone to stay with you, and you aren't safe to go home alone, we may keep you overnight. Insurance often won't pay for this.  After surgery  If it's hard to control your pain or you need more pain medicine, please call your surgeon's office.  Questions?   If you have any questions for your care team, list them here: _________________________________________________________________________________________________________________________________________________________________________ ____________________________________ ____________________________________ ____________________________________  For informational purposes only. Not to replace the advice of your health care provider. Copyright   2003, 2019 [facility]. All rights reserved. Clinically reviewed by Sandee Hadley MD. [employer] 230810 - REV 07/21.

## 2022-08-16 NOTE — PROGRESS NOTES
[facility]  [address] [address]  Phone: [phone]  Primary Provider: Ap Alvarez  Pre-op Performing Provider: AP ALVAREZ      PREOPERATIVE EVALUATION:  Today's date: 8/16/2022    Johan Cole is a 56 year old male who presents for a preoperative evaluation.    Surgical Information:  Surgery/Procedure: Left Total Knee Arthroplasty  Surgery Location: [facility]  Surgeon: Dr. Desmond Doan  Surgery Date: 8/22/2022  Time of Surgery: 0845  Where patient plans to recover: At home with family  Fax number for surgical facility: [phone]    Type of Anesthesia Anticipated: General    Assessment and Plan    (Z01.818) Preop general physical exam  (primary encounter diagnosis)  Comment: uncontrolled HTN, can not approve surgery  Plan:     (I10) Benign essential hypertension  Comment: pt has not started olmesartan due to cost, is taking 20mg lisinopril.  Will add chlortalidone, close follow up and will agreesively titirate meds to contorl  Plan: lisinopril (ZESTRIL) 20 MG tablet,         chlorthalidone (HYGROTON) 25 MG tablet              RTC in 1w for nse recheck, 2w with me.    Ap Alvarez MD      Subjective     HPI related to upcoming procedure: has not yet picked up BP med, hasn't been getting disability payments and the medication is pretty expensive.  Feeling well overall.  No acute concerns    Preop Questions 8/16/2022   1. Have you ever had a heart attack or stroke? No   2. Have you ever had surgery on your heart or blood vessels, such as a stent placement, a coronary artery bypass, or surgery on an artery in your head, neck, heart, or legs? No   3. Do you have chest pain with activity? No   4. Do you have a history of  heart failure? No   5. Do you currently have a cold, bronchitis or symptoms of other infection? No   6. Do you have a cough, shortness of breath, or wheezing? No   7. Do you or anyone in  your family have previous history of blood clots? No   8. Do you or does anyone in your family have a serious bleeding problem such as prolonged bleeding following surgeries or cuts? No   9. Have you ever had problems with anemia or been told to take iron pills? No   10. Have you had any abnormal blood loss such as black, tarry or bloody stools? No   11. Have you ever had a blood transfusion? No   12. Are you willing to have a blood transfusion if it is medically needed before, during, or after your surgery? Yes   13. Have you or any of your relatives ever had problems with anesthesia? No   14. Do you have sleep apnea, excessive snoring or daytime drowsiness? YES - DONA, does not have a machine right now.   14a. Do you have a CPAP machine? No   15. Do you have any artifical heart valves or other implanted medical devices like a pacemaker, defibrillator, or continuous glucose monitor? No   16. Do you have artificial joints? No   17. Are you allergic to latex? No       Health Care Directive:  Patient does not have a Health Care Directive or Living Will:     Preoperative Review of :   reviewed -  Rxs      Status of Chronic Conditions:  HYPERTENSION - Patient has longstanding history of HTN , currently denies any symptoms referable to elevated blood pressure. Specifically denies chest pain, palpitations, dyspnea, orthopnea, PND or peripheral edema. Blood pressure readings have not been in normal range. Current medication regimen is as listed below. Patient denies any side effects of medication.     HYPOTHYROIDISM - Patient has a longstanding history of chronic Hypothyroidism. Patient has been doing well, noting no tremor, insomnia, hair loss or changes in skin texture. Continues to take medications as directed, without adverse reactions or side effects. Last TSH   Lab Results   Component Value Date    TSH 26.47 (H) 2022   .        Review of Systems  CONSTITUTIONAL: NEGATIVE for fever, chills, change in  "weight  ENT/MOUTH: NEGATIVE for ear, mouth and throat problems  RESP: NEGATIVE for significant cough or SOB  CV: NEGATIVE for chest pain, palpitations or peripheral edema    Patient Active Problem List    Diagnosis Date Noted     Post-traumatic osteoarthritis of left knee 04/07/2022     Priority: Medium     DONA (obstructive sleep apnea) 04/07/2022     Priority: Medium     S/P complete thyroidectomy 04/07/2022     Priority: Medium     Benign essential hypertension 04/07/2022     Priority: Medium     Morbid obesity (H) 04/07/2022     Priority: Medium      History reviewed. No pertinent past medical history.  Past Surgical History:   Procedure Laterality Date     AS KNEE SCOPE,MED/LAT MENISCUS REPAIR       INGUINAL HERNIA REPAIR       THYROIDECTOMY   06/01/2005     TOTAL HIP ARTHROPLASTY  05/01/2015    Deosn't remember side     Current Outpatient Medications   Medication Sig Dispense Refill     levothyroxine (SYNTHROID/LEVOTHROID) 100 MCG tablet Take 1 tablet (100 mcg) by mouth daily 90 tablet 1     lisinopril (ZESTRIL) 20 MG tablet Take 1 tablet (20 mg) by mouth daily 30 tablet 1       No Known Allergies     Social History     Tobacco Use     Smoking status: Never Smoker     Smokeless tobacco: Current User     Types: Chew   Substance Use Topics     Alcohol use: Yes     Comment: Rare       History   Drug Use Unknown         Objective     BP (!) 178/108 (BP Location: Right arm, Patient Position: Chair, Cuff Size: Adult Large)   Pulse 84   Temp 98.1  F (36.7  C) (Oral)   Resp 16   Ht 1.638 m (5' 4.5\")   Wt 112.8 kg (248 lb 9.6 oz)   SpO2 96%   BMI 42.01 kg/m      Physical Exam  Vitals and nursing note reviewed.   HENT:      Head: Normocephalic.   Eyes:      Conjunctiva/sclera: Conjunctivae normal.   Cardiovascular:      Rate and Rhythm: Normal rate and regular rhythm.      Heart sounds: Normal heart sounds.   Pulmonary:      Effort: Pulmonary effort is normal.      Breath sounds: Normal breath sounds.   Skin:     " General: Skin is warm and dry.   Neurological:      General: No focal deficit present.      Mental Status: He is alert and oriented to person, place, and time.   Psychiatric:         Mood and Affect: Mood normal.         Behavior: Behavior normal.           Recent Labs   Lab Test 04/07/22  1419   HGB 14.8         POTASSIUM 4.2   CR 0.87        Diagnostics:         Revised Cardiac Risk Index (RCRI):  The patient has the following serious cardiovascular risks for perioperative complications:   - No serious cardiac risks = 0 points     RCRI Interpretation: 1 point: Class II (low risk - 0.9% complication rate)           Signed Electronically by: Ap Alvarez MD  Copy of this evaluation report is provided to requesting physician.

## 2022-08-23 ENCOUNTER — ALLIED HEALTH/NURSE VISIT (OUTPATIENT)
Dept: FAMILY MEDICINE | Facility: CLINIC | Age: 57
End: 2022-08-23
Payer: COMMERCIAL

## 2022-08-23 VITALS — OXYGEN SATURATION: 97 % | SYSTOLIC BLOOD PRESSURE: 150 MMHG | DIASTOLIC BLOOD PRESSURE: 96 MMHG | HEART RATE: 101 BPM

## 2022-08-23 DIAGNOSIS — Z01.30 BP CHECK: Primary | ICD-10-CM

## 2022-08-23 PROCEDURE — 99207 PR NO CHARGE NURSE ONLY: CPT

## 2022-08-23 NOTE — PROGRESS NOTES
Johan Cole is a 56 year old patient who comes in today for a Blood Pressure check.  Initial BP:  BP (!) 150/96 (BP Location: Right arm, Patient Position: Sitting)   Pulse 101   SpO2 97%      Data Unavailable  Disposition: follow-up as previously indicated by provider

## 2022-09-06 ENCOUNTER — MEDICAL CORRESPONDENCE (OUTPATIENT)
Dept: HEALTH INFORMATION MANAGEMENT | Facility: CLINIC | Age: 57
End: 2022-09-06

## 2022-09-06 ENCOUNTER — TELEPHONE (OUTPATIENT)
Dept: FAMILY MEDICINE | Facility: CLINIC | Age: 57
End: 2022-09-06

## 2022-09-06 NOTE — TELEPHONE ENCOUNTER
Rec'd Orders from [facility]. Placed in PCP basket for review and signature. Fax [phone]    Celia Saravia-

## 2022-09-09 ENCOUNTER — TELEPHONE (OUTPATIENT)
Dept: FAMILY MEDICINE | Facility: CLINIC | Age: 57
End: 2022-09-09

## 2022-09-09 NOTE — TELEPHONE ENCOUNTER
Reason for Call:  Other appointment    Detailed comments: Pt is looking to get in with pcp to joel forbes fllw up and no abl until dec. Other provider not until `10/3 avl can we squeeze him in with primary soon     Phone Number Patient can be reached at: Cell number on file:    Telephone Information:   Mobile [phone]       Best Time: Anytime    Can we leave a detailed message on this number? YES    Call taken on 9/9/2022 at 1:10 PM by Kimberlee Jimenez

## 2022-09-13 DIAGNOSIS — E11.69 TYPE 2 DIABETES MELLITUS WITH OTHER SPECIFIED COMPLICATION, UNSPECIFIED WHETHER LONG TERM INSULIN USE (H): Primary | ICD-10-CM

## 2022-09-14 ENCOUNTER — TELEPHONE (OUTPATIENT)
Dept: FAMILY MEDICINE | Facility: CLINIC | Age: 57
End: 2022-09-14

## 2022-09-14 NOTE — TELEPHONE ENCOUNTER
Original message routed to wrong pool. Came across message when speaking to pharmacy- scheduled patient for 09/15 priscila/Kim.    Ramya HANCOCK  [facility]   [facility]

## 2022-09-14 NOTE — TELEPHONE ENCOUNTER
Sent in insulin to get him to appointment with Dr. Alvarez tomorrow. Please call and let him know.    Clara Sharp PA-C

## 2022-09-14 NOTE — TELEPHONE ENCOUNTER
Called and informed patient below.    Ramya HANCOCK  [facility]   [facility]

## 2022-09-14 NOTE — TELEPHONE ENCOUNTER
Patient states Lantus prescription that was sent today is expensive and cannot afford 200.00.    This nurse spoke with pharmacy and substitutions would be around same price.    Patient blood sugar readings past 2 days between 63 and 197.     Last Lantus dose last evening. BS at this time 155. Denies any concerning symptoms.    Huddled with AVANI Sharp.    Advised patient that changing insulins would be a discussion and education. Patient has appt tomorrow 9/14 in am. Would discuss further then.    Advised monitoring BS today and tomorrow am. If 400 or above or having symptoms would need to return to ER.    Avoid processed foods and high carb foods.    Patient expressed understanding of advice.    Zara Ramos RN

## 2022-09-14 NOTE — TELEPHONE ENCOUNTER
Routing refill request to provider for review/approval because:  Drug not active on patient's medication list.    Patient did have on med list per 8/30/22 hospital admission.    insulin glargine, U-100, 100 unit/mL (3 mL) pen    Indications: Diabetic hyperosmolar non-ketotic state (HC) Inject 35 units subcutaneous two times daily. Product desired: BASAGLAR 15 mL   0 09/01/2022       Please send prescription to cover until visit tomorrow 9/15 with SB    Routed to  as POD and pcps not in office.    Zara Ramos RN

## 2022-09-14 NOTE — TELEPHONE ENCOUNTER
Reason for Call: Request for an order or referral:    Order or referral being requested: Medication refill  (Basaglar quick pens)    Date needed: as soon as possible    Has the patient been seen by the PCP for this problem? YES    Additional comments:     Phone number Patient can be reached at:  Cell number on file:    Telephone Information:   Mobile [phone]       Best Time:  Anytime    Can we leave a detailed message on this number?  YES    Call taken on 9/13/2022 at 7:49 PM by Paola Hernandez

## 2022-09-14 NOTE — TELEPHONE ENCOUNTER
Patient was admitted to [facility] with hyperglycemia, type 2 diabetes. He called central scheduling to make his follow up on 09/09 but the central scheduler sent his TE to a completely wrong pool, RM TC never received his message (see encounter for 09/09). TC followed up with him today, as we came across his scheduling message while speaking to his pharmacy on the phone.     Patient is out of Basalgar quick pens and has not had any since 9/13.  He needs this refilled ASAP. Made inpatient follow up with pcp, Dr. Alvarez for 09/15.  Patient uses the following pharmacy:  [id number] [place] [place] - [address]    Patient wants a call back with status of medication-  [phone]    Ramya HANCOCK  [facility]   [facility]

## 2022-09-15 ENCOUNTER — OFFICE VISIT (OUTPATIENT)
Dept: FAMILY MEDICINE | Facility: CLINIC | Age: 57
End: 2022-09-15
Payer: COMMERCIAL

## 2022-09-15 ENCOUNTER — TELEPHONE (OUTPATIENT)
Dept: FAMILY MEDICINE | Facility: CLINIC | Age: 57
End: 2022-09-15

## 2022-09-15 VITALS
HEART RATE: 72 BPM | OXYGEN SATURATION: 96 % | HEIGHT: 65 IN | SYSTOLIC BLOOD PRESSURE: 148 MMHG | DIASTOLIC BLOOD PRESSURE: 86 MMHG | RESPIRATION RATE: 14 BRPM | BODY MASS INDEX: 41.32 KG/M2 | TEMPERATURE: 97.4 F | WEIGHT: 248 LBS

## 2022-09-15 DIAGNOSIS — E89.0 S/P COMPLETE THYROIDECTOMY: Primary | ICD-10-CM

## 2022-09-15 DIAGNOSIS — E11.69 TYPE 2 DIABETES MELLITUS WITH OTHER SPECIFIED COMPLICATION, UNSPECIFIED WHETHER LONG TERM INSULIN USE (H): ICD-10-CM

## 2022-09-15 DIAGNOSIS — I10 BENIGN ESSENTIAL HYPERTENSION: ICD-10-CM

## 2022-09-15 DIAGNOSIS — Z12.11 SCREEN FOR COLON CANCER: ICD-10-CM

## 2022-09-15 PROCEDURE — 36415 COLL VENOUS BLD VENIPUNCTURE: CPT | Performed by: FAMILY MEDICINE

## 2022-09-15 PROCEDURE — 99214 OFFICE O/P EST MOD 30 MIN: CPT | Performed by: FAMILY MEDICINE

## 2022-09-15 PROCEDURE — 84439 ASSAY OF FREE THYROXINE: CPT | Performed by: FAMILY MEDICINE

## 2022-09-15 PROCEDURE — 84443 ASSAY THYROID STIM HORMONE: CPT | Performed by: FAMILY MEDICINE

## 2022-09-15 RX ORDER — LISINOPRIL 40 MG/1
40 TABLET ORAL DAILY
Qty: 90 TABLET | Refills: 0 | Status: SHIPPED | OUTPATIENT
Start: 2022-09-15 | End: 2022-12-15

## 2022-09-15 RX ORDER — METFORMIN HCL 500 MG
1000 TABLET, EXTENDED RELEASE 24 HR ORAL 2 TIMES DAILY WITH MEALS
Qty: 360 TABLET | Refills: 1 | Status: SHIPPED | OUTPATIENT
Start: 2022-09-15 | End: 2023-05-26

## 2022-09-15 ASSESSMENT — PAIN SCALES - GENERAL: PAINLEVEL: NO PAIN (0)

## 2022-09-15 NOTE — PROGRESS NOTES
Assessment and Plan    (E89.0) S/P complete thyroidectomy  (primary encounter diagnosis)  Comment: retesting, restarted meds about 2m ago  Plan: TSH with free T4 reflex            (E11.69) Type 2 diabetes mellitus with other specified complication, unspecified whether long term insulin use (H)  Comment: Is taking Lantus QID, will have him use this BID, starting metformin.  REduce insulin dose by 2u daily for FBG >120  Plan: Albumin Random Urine Quantitative with Creat         Ratio, insulin glargine (LANTUS PEN) 100         UNIT/ML pen, metFORMIN (GLUCOPHAGE XR) 500 MG         24 hr tablet            (I10) Benign essential hypertension  Comment: BP better, but still needs increase  Plan: lisinopril (ZESTRIL) 40 MG tablet            (Z12.11) Screen for colon cancer  Comment:   Plan: Fecal colorectal cancer screen FIT              RTC in 1m    Ap Alvarez MD      Benita Prado is a 56 year old, presenting for the following health issues:  Hospital F/U      [facility]       Hospital Follow-up Visit:    Hospital/Nursing Home/IP Rehab Facility: [facility]  Date of Admission: 08/30/2022  Date of Discharge: 09/02/2022  Reason(s) for Admission: Diabetic hyperosmolar non-ketotic state     Was your hospitalization related to COVID-19? No   Problems taking medications regularly:  None  Medication changes since discharge: None  Problems adhering to non-medication therapy:      Summary of hospitalization: Reviewd on Care Everywhere  Diagnostic Tests/Treatments reviewed.  Follow up needed: none  Other Healthcare Providers Involved in Patient s Care:         None  Update since discharge: improved.   Post Medication Reconciliation Status:        Plan of care communicated with patient     Currently using 35u of Lantus QID, per Johan this was what he was told to use.  Per discharge instructions this was to be BID.  Feeling well.  Denies CP, palpitations, edema, dyspnea, HA, vision changes.  No episodes of hypoglucemia, despite  "reporting FBG today of 88.    Has been taking levothyroxine.    Review of Systems   Constitutional: Negative.  Negative for diaphoresis.   Eyes: Negative for visual disturbance.   Respiratory: Negative for shortness of breath.    Cardiovascular: Negative for chest pain, palpitations and peripheral edema.   Gastrointestinal: Negative for nausea.   Neurological: Negative for numbness, headaches and paresthesias.            Objective    BP (!) 148/86 (BP Location: Right arm, Patient Position: Sitting, Cuff Size: Adult Large)   Pulse 72   Temp 97.4  F (36.3  C) (Tympanic)   Resp 14   Ht 1.638 m (5' 4.5\")   Wt 112.5 kg (248 lb)   SpO2 96%   BMI 41.91 kg/m    Body mass index is 41.91 kg/m .  Physical Exam  Vitals and nursing note reviewed.   HENT:      Head: Normocephalic.   Eyes:      Conjunctiva/sclera: Conjunctivae normal.   Cardiovascular:      Rate and Rhythm: Normal rate and regular rhythm.      Heart sounds: Normal heart sounds.   Pulmonary:      Effort: Pulmonary effort is normal.      Breath sounds: Normal breath sounds.   Skin:     General: Skin is warm and dry.   Neurological:      General: No focal deficit present.      Mental Status: He is alert and oriented to person, place, and time.   Psychiatric:         Mood and Affect: Mood normal.         Behavior: Behavior normal.                            "

## 2022-09-15 NOTE — TELEPHONE ENCOUNTER
Rec'd CPAP Supply Order Forms from [facility]. Placed in PCP basket for review and signature. Fax [phone]    Celia Saravia-

## 2022-09-15 NOTE — PATIENT INSTRUCTIONS
Start using insulin twice daily.    Increase lisinopril to 40mg daily.  Take 2 x 20 mg lisinopril until those used up.  Take only one of the 40mg tablets.    Metformin - two pills with breakfast, two pills with dinner.    Goal for fasting blood sugar is around 120.  If lower than 100, reduce insulin dose by 1u on both doses.  Do not make a dose change more often than every three days.

## 2022-09-15 NOTE — TELEPHONE ENCOUNTER
Received call from Flaca at [facility] inquiring if pt was seen for hospital follow up.  Confirmed pt was seen, no other information given.    Homa ADAMES RN, BSN  [facility]

## 2022-09-16 LAB
T4 FREE SERPL-MCNC: 1.09 NG/DL (ref 0.76–1.46)
TSH SERPL DL<=0.005 MIU/L-ACNC: 5.24 MU/L (ref 0.4–4)

## 2022-09-16 ASSESSMENT — ENCOUNTER SYMPTOMS
CONSTITUTIONAL NEGATIVE: 1
PALPITATIONS: 0
HEADACHES: 0
NUMBNESS: 0
SHORTNESS OF BREATH: 0
PARESTHESIAS: 0
NAUSEA: 0
DIAPHORESIS: 0

## 2022-09-16 NOTE — TELEPHONE ENCOUNTER
Orders seem fishy, no record of previous use of this service or these supplies.  Will address at next appointment.    Ap Alvarez MD

## 2022-09-19 ENCOUNTER — TELEPHONE (OUTPATIENT)
Dept: FAMILY MEDICINE | Facility: CLINIC | Age: 57
End: 2022-09-19

## 2022-09-19 NOTE — TELEPHONE ENCOUNTER
Rec'd Fre[employer] Medicare Order from [facility] [employer]. Placed in PCP basket for review and signature. Fax [phone]    Celia Saravia-

## 2022-09-26 NOTE — TELEPHONE ENCOUNTER
Second request received, placed in pcp basket.    Ramya HANCOCK  [facility]   [facility]

## 2022-09-27 ENCOUNTER — MEDICAL CORRESPONDENCE (OUTPATIENT)
Dept: HEALTH INFORMATION MANAGEMENT | Facility: CLINIC | Age: 57
End: 2022-09-27

## 2022-09-27 NOTE — TELEPHONE ENCOUNTER
Faxed and abstracted to tc basket at [place].    Ramya HANCOCK  [facility]   [facility]

## 2022-09-28 ENCOUNTER — NURSE TRIAGE (OUTPATIENT)
Dept: NURSING | Facility: CLINIC | Age: 57
End: 2022-09-28

## 2022-09-28 DIAGNOSIS — I10 BENIGN ESSENTIAL HYPERTENSION: ICD-10-CM

## 2022-09-28 RX ORDER — CHLORTHALIDONE 25 MG/1
25 TABLET ORAL DAILY
Qty: 30 TABLET | Refills: 1 | OUTPATIENT
Start: 2022-09-28

## 2022-09-28 NOTE — TELEPHONE ENCOUNTER
Contacted patient and informed of refill remaining at pharmacy. Pt states that he will  medication today.    Pt also asked about scheduling appointment. Per last OV note provider recommended follow up around 10/15/22 for Diabetes. Appointment was scheduled:    Appointments in Next Year    Oct 13, 2022  4:00 PM  (Arrive by 3:40 PM)  Provider Visit with Ap Alvarez MD  [facility] ([facility] ) [phone]        Pt denies additional questions or concerns at this time.    Nilsa SCANLON RN

## 2022-09-28 NOTE — TELEPHONE ENCOUNTER
Addressed in another encounter.    One refill remaining of medication, pt is aware of this and will  today.    Follow up appointment scheduled with Dr. Alvarez:    Appointments in Next Year    Oct 13, 2022  4:00 PM  (Arrive by 3:40 PM)  Provider Visit with Ap Alvarez MD  [facility] ([facility] ) [phone]        Nilsa SCANLON RN

## 2022-09-28 NOTE — TELEPHONE ENCOUNTER
Saw Dr Alvarez.  Was prescribed med for thyroid, chlorthalidone. When he went to get the metformin at the pharmacy, they didn't have an order for the chlorthalidone. He doesn't know if MD forgot to order the chlorthalidone 25 mg? He didn't get it when he picked up his metformin and he's out of the medication. He uses [facility] pharmacy in [place].  She Castillo RN  [facility]

## 2022-09-28 NOTE — TELEPHONE ENCOUNTER
Addendum 0974: Please disregard message. Contacted pharmacy and pt still has one refill remaining of medication. Will notify patient.      Routing refill request to provider for review/approval because:  BP out of range.  BP Readings from Last 3 Encounters:   09/15/22 (!) 148/86   08/23/22 (!) 150/96   08/16/22 (!) 178/108     Last OV with Dr. Alvarez on 9/15/22 lisinopril was increased from 20 mg daily to 40 mg daily due to hypertension. No changes noted to chlorthalidone dosing at visit (still 25 mg every day).    Will route to POD since pt's PCP is out of the office today and pt is currently out of medication.    Nilsa SCANLON RN

## 2022-09-28 NOTE — TELEPHONE ENCOUNTER
Saw Dr Alvarez.  Was prescribed med for thyroid, chlorthalidone. When he went to get the metformin at the pharmacy, they didn't have an order for the chlorthalidone. He doesn't know if MD forgot to order the chlorthalidone 25 mg? He didn't get it when he picked up his metformin and he's out of the medication. He uses [facility] in [place].    2nd question. Patient thought MD said there was to be an Oct 15th appointment and that's a Saturday.  Nothing shows up in the chart for future appointments. Can you check into this to see if he needs another appointment with Dr Alvarez?    He can be reached at:  [phone].  May leave a detailed message.  She Castillo RN  [facility]    Reason for Disposition    Nursing judgment    Additional Information    Negative: Nursing judgment    Negative: Nursing judgment    Negative: Nursing judgment    Protocols used: INFORMATION ONLY CALL - NO TRIAGE-A-OH

## 2022-10-07 NOTE — TELEPHONE ENCOUNTER
Fax received requesting the following clinic notes:     1 Explanation of why patient must test at the prescribed frequency   2 Indication if the patient is treating with insulin or using pump *if using insulin, indication if the patient requires frequent adjustments   3 Patient testing logs    Fax when completed to [phone]    Ramya HANCOCK  [facility]   [facility]

## 2022-10-11 DIAGNOSIS — I10 BENIGN ESSENTIAL HYPERTENSION: ICD-10-CM

## 2022-10-12 RX ORDER — LISINOPRIL 20 MG/1
20 TABLET ORAL DAILY
Qty: 30 TABLET | Refills: 0 | OUTPATIENT
Start: 2022-10-12

## 2022-10-29 DIAGNOSIS — I10 BENIGN ESSENTIAL HYPERTENSION: ICD-10-CM

## 2022-10-31 NOTE — TELEPHONE ENCOUNTER
Routing refill request to provider for review/approval because:  Labs out of range:  BP    BP Readings from Last 3 Encounters:   09/15/22 (!) 148/86   08/23/22 (!) 150/96   08/16/22 (!) 178/108     Sunil VARMA RN 10/31/2022 at 2:31 PM

## 2022-11-03 RX ORDER — CHLORTHALIDONE 25 MG/1
25 TABLET ORAL DAILY
Qty: 30 TABLET | Refills: 0 | Status: SHIPPED | OUTPATIENT
Start: 2022-11-03 | End: 2023-01-12

## 2022-11-18 ENCOUNTER — MEDICAL CORRESPONDENCE (OUTPATIENT)
Dept: HEALTH INFORMATION MANAGEMENT | Facility: CLINIC | Age: 57
End: 2022-11-18

## 2022-11-21 ENCOUNTER — TELEPHONE (OUTPATIENT)
Dept: FAMILY MEDICINE | Facility: CLINIC | Age: 57
End: 2022-11-21

## 2022-11-21 NOTE — TELEPHONE ENCOUNTER
Rec'd CPAP Supply Orders from [facility] [employer]. Placed in PCP basket for review and signature. Fax [phone]    Lula Lucio

## 2022-11-29 ENCOUNTER — TELEPHONE (OUTPATIENT)
Dept: FAMILY MEDICINE | Facility: CLINIC | Age: 57
End: 2022-11-29

## 2022-11-29 NOTE — TELEPHONE ENCOUNTER
Rec'd RX-CPAP Heated Tubing from [facility]. Placed in PCP basket for review and signature. Fax [phone]    Lula Lucio

## 2022-12-01 ENCOUNTER — MEDICAL CORRESPONDENCE (OUTPATIENT)
Dept: HEALTH INFORMATION MANAGEMENT | Facility: CLINIC | Age: 57
End: 2022-12-01

## 2022-12-14 DIAGNOSIS — I10 BENIGN ESSENTIAL HYPERTENSION: ICD-10-CM

## 2022-12-15 RX ORDER — LISINOPRIL 40 MG/1
40 TABLET ORAL DAILY
Qty: 30 TABLET | Refills: 0 | Status: SHIPPED | OUTPATIENT
Start: 2022-12-15 | End: 2023-02-02

## 2022-12-15 NOTE — TELEPHONE ENCOUNTER
Routing refill request to provider for review/approval because:  Elevated BP  BP Readings from Last 3 Encounters:   09/15/22 (!) 148/86   08/23/22 (!) 150/96   08/16/22 (!) 178/108         Homa Gomez RN, BSN  [facility]

## 2023-01-06 DIAGNOSIS — I10 BENIGN ESSENTIAL HYPERTENSION: ICD-10-CM

## 2023-01-10 NOTE — TELEPHONE ENCOUNTER
Routing refill request to provider for review/approval because:  A break in medication  BP out of range    BP Readings from Last 6 Encounters:   09/15/22 (!) 148/86   08/23/22 (!) 150/96   08/16/22 (!) 178/108   08/04/22 (!) 158/104   07/19/22 (!) 164/116   04/15/22 126/84     Next 5 appointments (look out 90 days)      Feb 03, 2023  2:30 PM  (Arrive by 2:10 PM)  Adult Preventative Visit with Ap Alvarez MD  [facility] ([facility] ) [address] [facility] [address] [phone]

## 2023-01-12 RX ORDER — CHLORTHALIDONE 25 MG/1
25 TABLET ORAL DAILY
Qty: 30 TABLET | Refills: 0 | Status: SHIPPED | OUTPATIENT
Start: 2023-01-12 | End: 2023-05-16

## 2023-05-08 ENCOUNTER — TELEPHONE (OUTPATIENT)
Dept: FAMILY MEDICINE | Facility: CLINIC | Age: 58
End: 2023-05-08
Payer: COMMERCIAL

## 2023-05-08 DIAGNOSIS — E11.65 TYPE 2 DIABETES MELLITUS WITH HYPERGLYCEMIA, WITH LONG-TERM CURRENT USE OF INSULIN (H): Primary | ICD-10-CM

## 2023-05-08 DIAGNOSIS — E78.5 HYPERLIPIDEMIA LDL GOAL <100: ICD-10-CM

## 2023-05-08 DIAGNOSIS — Z79.4 TYPE 2 DIABETES MELLITUS WITH HYPERGLYCEMIA, WITH LONG-TERM CURRENT USE OF INSULIN (H): Primary | ICD-10-CM

## 2023-05-09 PROBLEM — E11.65 TYPE 2 DIABETES MELLITUS WITH HYPERGLYCEMIA, WITH LONG-TERM CURRENT USE OF INSULIN (H): Status: ACTIVE | Noted: 2023-05-09

## 2023-05-09 PROBLEM — Z79.4 TYPE 2 DIABETES MELLITUS WITH HYPERGLYCEMIA, WITH LONG-TERM CURRENT USE OF INSULIN (H): Status: ACTIVE | Noted: 2023-05-09

## 2023-05-09 PROBLEM — E78.5 HYPERLIPIDEMIA LDL GOAL <100: Status: ACTIVE | Noted: 2023-05-09

## 2023-05-09 NOTE — TELEPHONE ENCOUNTER
May use available ASHWIN appt.    Please have pt come in for fasting labs 2-3 days beforehand.    Ap Alvarez MD

## 2023-05-13 DIAGNOSIS — I10 BENIGN ESSENTIAL HYPERTENSION: ICD-10-CM

## 2023-05-15 NOTE — TELEPHONE ENCOUNTER
Routing refill request to provider for review/approval because:  BP Readings from Last 6 Encounters:   09/15/22 (!) 148/86   08/23/22 (!) 150/96   08/16/22 (!) 178/108   08/04/22 (!) 158/104   07/19/22 (!) 164/116   04/15/22 126/84      Gayle Negron RN

## 2023-05-16 RX ORDER — CHLORTHALIDONE 25 MG/1
25 TABLET ORAL DAILY
Qty: 30 TABLET | Refills: 0 | Status: SHIPPED | OUTPATIENT
Start: 2023-05-16 | End: 2023-05-22

## 2023-05-22 ENCOUNTER — OFFICE VISIT (OUTPATIENT)
Dept: FAMILY MEDICINE | Facility: CLINIC | Age: 58
End: 2023-05-22
Payer: COMMERCIAL

## 2023-05-22 VITALS
TEMPERATURE: 97.4 F | OXYGEN SATURATION: 99 % | HEIGHT: 65 IN | DIASTOLIC BLOOD PRESSURE: 108 MMHG | HEART RATE: 65 BPM | BODY MASS INDEX: 42.49 KG/M2 | RESPIRATION RATE: 16 BRPM | WEIGHT: 255 LBS | SYSTOLIC BLOOD PRESSURE: 162 MMHG

## 2023-05-22 DIAGNOSIS — E11.65 TYPE 2 DIABETES MELLITUS WITH HYPERGLYCEMIA, WITH LONG-TERM CURRENT USE OF INSULIN (H): ICD-10-CM

## 2023-05-22 DIAGNOSIS — E29.1 HYPOGONADISM IN MALE: ICD-10-CM

## 2023-05-22 DIAGNOSIS — E78.5 HYPERLIPIDEMIA LDL GOAL <100: ICD-10-CM

## 2023-05-22 DIAGNOSIS — Z79.4 TYPE 2 DIABETES MELLITUS WITH HYPERGLYCEMIA, WITH LONG-TERM CURRENT USE OF INSULIN (H): ICD-10-CM

## 2023-05-22 DIAGNOSIS — Q98.4 KLINEFELTER'S SYNDROME: ICD-10-CM

## 2023-05-22 DIAGNOSIS — R45.86 MOOD CHANGE: ICD-10-CM

## 2023-05-22 DIAGNOSIS — E89.0 S/P COMPLETE THYROIDECTOMY: ICD-10-CM

## 2023-05-22 DIAGNOSIS — E66.01 MORBID OBESITY (H): ICD-10-CM

## 2023-05-22 DIAGNOSIS — I10 BENIGN ESSENTIAL HYPERTENSION: ICD-10-CM

## 2023-05-22 DIAGNOSIS — F81.9 LEARNING DISABILITY: Primary | ICD-10-CM

## 2023-05-22 PROBLEM — R80.9 MICROALBUMINURIA: Status: ACTIVE | Noted: 2023-05-22

## 2023-05-22 LAB
ALBUMIN SERPL BCG-MCNC: 4 G/DL (ref 3.5–5.2)
ALP SERPL-CCNC: 88 U/L (ref 40–129)
ALT SERPL W P-5'-P-CCNC: 27 U/L (ref 10–50)
ANION GAP SERPL CALCULATED.3IONS-SCNC: 12 MMOL/L (ref 7–15)
AST SERPL W P-5'-P-CCNC: 29 U/L (ref 10–50)
BILIRUB SERPL-MCNC: 0.4 MG/DL
BUN SERPL-MCNC: 9.3 MG/DL (ref 6–20)
CALCIUM SERPL-MCNC: 9.5 MG/DL (ref 8.6–10)
CHLORIDE SERPL-SCNC: 107 MMOL/L (ref 98–107)
CHOLEST SERPL-MCNC: 279 MG/DL
CREAT SERPL-MCNC: 0.98 MG/DL (ref 0.67–1.17)
CREAT UR-MCNC: 139 MG/DL
DEPRECATED HCO3 PLAS-SCNC: 25 MMOL/L (ref 22–29)
GFR SERPL CREATININE-BSD FRML MDRD: >90 ML/MIN/1.73M2
GLUCOSE SERPL-MCNC: 136 MG/DL (ref 70–99)
HBA1C MFR BLD: 7.4 % (ref 0–5.6)
HDLC SERPL-MCNC: 36 MG/DL
LDLC SERPL CALC-MCNC: 200 MG/DL
MICROALBUMIN UR-MCNC: 41.7 MG/L
MICROALBUMIN/CREAT UR: 30 MG/G CR (ref 0–17)
NONHDLC SERPL-MCNC: 243 MG/DL
POTASSIUM SERPL-SCNC: 4.7 MMOL/L (ref 3.4–5.3)
PROT SERPL-MCNC: 7.3 G/DL (ref 6.4–8.3)
SODIUM SERPL-SCNC: 144 MMOL/L (ref 136–145)
T4 FREE SERPL-MCNC: 0.57 NG/DL (ref 0.9–1.7)
TRIGL SERPL-MCNC: 213 MG/DL
TSH SERPL DL<=0.005 MIU/L-ACNC: 33.9 UIU/ML (ref 0.3–4.2)

## 2023-05-22 PROCEDURE — 82570 ASSAY OF URINE CREATININE: CPT | Performed by: PHYSICIAN ASSISTANT

## 2023-05-22 PROCEDURE — 84443 ASSAY THYROID STIM HORMONE: CPT | Performed by: PHYSICIAN ASSISTANT

## 2023-05-22 PROCEDURE — 84439 ASSAY OF FREE THYROXINE: CPT | Performed by: PHYSICIAN ASSISTANT

## 2023-05-22 PROCEDURE — 99214 OFFICE O/P EST MOD 30 MIN: CPT | Performed by: PHYSICIAN ASSISTANT

## 2023-05-22 PROCEDURE — 83036 HEMOGLOBIN GLYCOSYLATED A1C: CPT | Performed by: PHYSICIAN ASSISTANT

## 2023-05-22 PROCEDURE — 80053 COMPREHEN METABOLIC PANEL: CPT | Performed by: PHYSICIAN ASSISTANT

## 2023-05-22 PROCEDURE — 82043 UR ALBUMIN QUANTITATIVE: CPT | Performed by: PHYSICIAN ASSISTANT

## 2023-05-22 PROCEDURE — 36415 COLL VENOUS BLD VENIPUNCTURE: CPT | Performed by: PHYSICIAN ASSISTANT

## 2023-05-22 PROCEDURE — 80061 LIPID PANEL: CPT | Performed by: PHYSICIAN ASSISTANT

## 2023-05-22 RX ORDER — LEVOTHYROXINE SODIUM 100 UG/1
100 TABLET ORAL DAILY
Qty: 90 TABLET | Refills: 1 | Status: CANCELLED | OUTPATIENT
Start: 2023-05-22

## 2023-05-22 RX ORDER — LEVOTHYROXINE SODIUM 100 UG/1
100 TABLET ORAL DAILY
Qty: 90 TABLET | Refills: 0 | Status: SHIPPED | OUTPATIENT
Start: 2023-05-22 | End: 2023-10-31

## 2023-05-22 RX ORDER — CHLORTHALIDONE 25 MG/1
25 TABLET ORAL DAILY
Qty: 30 TABLET | Refills: 0 | Status: SHIPPED | OUTPATIENT
Start: 2023-05-22 | End: 2023-08-25

## 2023-05-22 ASSESSMENT — PAIN SCALES - GENERAL: PAINLEVEL: SEVERE PAIN (6)

## 2023-05-22 NOTE — PATIENT INSTRUCTIONS
Continue metformin 1000 mg twice daily  Continue lisinopril and start chlorthalidone  Follow-up with Dr. Alvarez in 2 weeks

## 2023-05-22 NOTE — PROGRESS NOTES
Assessment & Plan     Klcharissafelter's syndrome  Learning disability  Most of the visit today was spent reviewing sister's concerns related to developmental disability, risk of homelessness, and resources they need. I did complete a housing form through the [facility] for them today based on his reported history of learning disability and special education services throughout school. Also, many individuals with Klinefelter syndrome have behavioral abnormalities and learning disabilities. They will follow-up for further neuropsych evaluation regarding concern for developmental delays and IQ testing.  I do recommend follow-up with endocrinology as he was on testosterone in the past but not for many years. Referred to care coordination to help with resources.  - Adult Endocrinology  Referral; Future  - Adult Mental Health  Referral; Future  - Primary Care - Care Coordination Referral; Future    Type 2 diabetes mellitus with hyperglycemia, with long-term current use of insulin (H)  He stopped insulin due to cost - hasn't been taking lantus since September 2022. He is taking metformin 1000 mg BID. He does not check his blood sugars at home. A1c today is 7.4%. Could consider GLP-1 but did not have time to discuss in detail today. He is scheduled to see PCP on 6/15/23. Will continue with metformin for now.   - Hemoglobin A1c; Future  - Albumin Random Urine Quantitative with Creat Ratio; Future  - Comprehensive metabolic panel (BMP + Alb, Alk Phos, ALT, AST, Total. Bili, TP); Future  - Hemoglobin A1c  - Albumin Random Urine Quantitative with Creat Ratio  - Comprehensive metabolic panel (BMP + Alb, Alk Phos, ALT, AST, Total. Bili, TP)    Hyperlipidemia LDL goal <100  Not on medication. Checking labs today.  - Lipid panel reflex to direct LDL Fasting; Future  - Comprehensive metabolic panel (BMP + Alb, Alk Phos, ALT, AST, Total. Bili, TP); Future  - Lipid panel reflex to direct LDL Fasting  - Comprehensive  "metabolic panel (BMP + Alb, Alk Phos, ALT, AST, Total. Bili, TP)    Benign essential hypertension  Uncontrolled. Has been taking lisinopril 40 mg daily but has been out of chlorthalidone for several months. Asymptomatic. Will restart chlorthalidone and follow-up with PCP as scheduled on 6/15/23.  - Comprehensive metabolic panel (BMP + Alb, Alk Phos, ALT, AST, Total. Bili, TP); Future  - Comprehensive metabolic panel (BMP + Alb, Alk Phos, ALT, AST, Total. Bili, TP)  - chlorthalidone (HYGROTON) 25 MG tablet; Take 1 tablet (25 mg) by mouth daily    S/P complete thyroidectomy  He has not been taking levothyroxine for several months. He was on 100 mcg daily in the past. TSH high at 33.9, T4 low at 0.57 today. Will restart levothyroxine 100 mcg daily and plan to recheck thyroid labs in 6 weeks.  - TSH with free T4 reflex; Future  - TSH with free T4 reflex  - levothyroxine (SYNTHROID/LEVOTHROID) 100 MCG tablet; Take 1 tablet (100 mcg) by mouth daily    Hypogonadism in male  As above, follow-up with endocrinology.  - Adult Endocrinology  Referral; Future    Morbid obesity (H)  Weight loss is recommended - would help improve blood pressure, diabetes, etc.    Mood change  Concerns with depression/anxiety. We did not have time to address today. Denies SI. Will follow-up with PCP.         BMI:   Estimated body mass index is 43.09 kg/m  as calculated from the following:    Height as of this encounter: 1.638 m (5' 4.5\").    Weight as of this encounter: 115.7 kg (255 lb).   Weight management plan: Discussed healthy diet and exercise guidelines      KENDRA [facility] GOMEZ Prado is a 57 year old, presenting for the following health issues:  Diabetes        5/22/2023     9:17 AM   Additional Questions   Roomed by minesh ortega cma   Accompanied by Sister          5/22/2023     9:17 AM   Patient Reported Additional Medications   Patient reports taking the following new " medications na     History of Present Illness       Reason for visit:  Confirmation of Klinefelters Syndrome as well as seeking referral for iq score    He eats 0-1 servings of fruits and vegetables daily.He consumes 3 sweetened beverage(s) daily.He exercises with enough effort to increase his heart rate 9 or less minutes per day.  He exercises with enough effort to increase his heart rate 3 or less days per week. He is missing 1 dose(s) of medications per week.     Multiple concerns today. Was not able to get in with PCP in his desired time frame but would like to schedule follow-up with PCP.    Johan is here with his sister today. She is working with [facility] to help Johan complete assessment for developmental disability waiver. They are requesting full scale IQ score, adaptive behavior score, neuropsych evaluation. They need forms completed today.    He is at risk for being homeless as of June 9. He has lived with his dad for the past 10-15 years but his dad is going to be moving into assisted living so he will no longer have housing after this. Johan does have history of learning disability and received special education services from elementary school through high school. 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 He saw endocrinology in the past and was on testosterone but it appears has not followed with them since 2012. His sister says that he struggles with keeping jobs due to cognitive difficulties. 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 He is  and had 2 adopted children during his marriage. During the divorce, he didn't understand the process or how to advocate for himself so he ended up losing custody of his kids and they have not been a part  "of his life since then. Sister says that he is vulnerable with money and he has lost 10s of thousands of dollars due to people manipulating him. His mom  about 1 year ago - before that time, his mom was very involved in managing his medication and healthcare. Since his mom , he has struggled to keep up with clinic appointments and hasn't been taking a few of his medications because he ran out of them - he hasn't been taking levothyroxine, chlorthalidone for many months - he doesn't recall how long.    For diabetes, he stopped insulin due to cost - hasn't been taking lantus since 2022. He is taking metformin 1000 mg BID. He does not check his blood sugars at home.    Hemoglobin A1C   Date Value Ref Range Status   2023 7.4 (H) 0.0 - 5.6 % Final     Comment:     Normal <5.7%   Prediabetes 5.7-6.4%    Diabetes 6.5% or higher     Note: Adopted from ADA consensus guidelines.       Blood pressure: taking lisinopril 40 mg daily. Has been out of chlorthalidone 25 mg for several months. Does not check BP at home. No chest pain, shortness of breath, swelling in the extremities, palpitations, dizziness, headaches, blurred vision.     BP Readings from Last 6 Encounters:   23 (!) 162/108   09/15/22 (!) 148/86   22 (!) 150/96   22 (!) 178/108   22 (!) 158/104   22 (!) 164/116     Sister also has concerns with mental health and depression. Has not addressed this before. Denies SI.    Review of Systems   Constitutional, HEENT, cardiovascular, pulmonary, gi and gu systems are negative, except as otherwise noted.      Objective    BP (!) 175/117 (BP Location: Right arm, Patient Position: Sitting, Cuff Size: Adult Large)   Pulse 65   Temp 97.4  F (36.3  C) (Oral)   Resp 16   Ht 1.638 m (5' 4.5\")   Wt 115.7 kg (255 lb)   SpO2 99%   BMI 43.09 kg/m    Body mass index is 43.09 kg/m .  Physical Exam   GENERAL: healthy, alert and no distress  RESP: lungs clear to auscultation - no " rales, rhonchi or wheezes  CV: regular rate and rhythm, normal S1 S2, no S3 or S4, no murmur, click or rub, no peripheral edema and peripheral pulses strong  NEURO: Alert and oriented x 3. Normal strength and tone and speech normal. Defers to sister to answer most questions.  PSYCH: affect flat, poor eye contact

## 2023-05-23 ENCOUNTER — PATIENT OUTREACH (OUTPATIENT)
Dept: CARE COORDINATION | Facility: CLINIC | Age: 58
End: 2023-05-23
Payer: COMMERCIAL

## 2023-05-23 NOTE — PROGRESS NOTES
Clinic Care Coordination Contact  Community Health Worker Initial Outreach    CHW Initial Information Gathering:  Referral Source: Care Team  Preferred Hospital: [facility]  [phone]  Current living arrangement:: I live in a private home with family  Type of residence:: Private home - stairs  Community Resources: Financial/Insurance ([facility] assessment on 8/22/23)  Supplies Currently Used at Home: None  Equipment Currently Used at Home: none  Informal Support system:: Family  No PCP office visit in Past Year: No  Transportation means:: Family  CHW Additional Questions  If ED/Hospital discharge, follow-up appointment scheduled as recommended?: N/A  Medication changes made following ED/Hospital discharge?: N/A  MyChart active?: No    CHW spoke with patients sister Lynsey Ventura [phone], [facility] on file   More notes from last office visit, she explains that patient is living with his dad in his home that has sold and closing date is 6/9/23, dad is moving to [facility]. Mom passed 1 year ago.   Patient is working and makes about $900-1000 a month, a few hundred dollars a week goes to child support. He has little to no extra money for housing.   He will have a [facility] assessment for DD waiver on 8/22. Patient is also in the process of being SMRT'd.  Not on any housing waitlists yet, has been looking at them and trying to see what would be the most appropriate. CHW explained the Section 8 voucher wait list is open, Lynsey has been unable to get on to make an account. She is frustrated with the entire county/ support system. Has been trying to find resources for Johan for months. Waited a long time to get a provider appt at the clinic as well.   Patient also doesn't have a vehicle right now, uses [employer] car for work right now. Unsure what to do when he moves.  They were looking into getting an RV to live in this summer as there are spots in [place].   Ultimately, she is struggling to figure out where to  start, doesn't want a long list to call.     Patient accepts CC: Yes. Patient scheduled for assessment with [facility] on 5/24 at 10:05am. Patient noted desire to discuss housing and transportation resources.     VICKIE Payne, B.S. [facility] Care Coordination  [facility] Clinics:  [place], [place] and [place]  [phone]  [email or web address]

## 2023-05-24 ENCOUNTER — PATIENT OUTREACH (OUTPATIENT)
Dept: NURSING | Facility: CLINIC | Age: 58
End: 2023-05-24
Payer: COMMERCIAL

## 2023-05-24 DIAGNOSIS — E11.69 TYPE 2 DIABETES MELLITUS WITH OTHER SPECIFIED COMPLICATION, UNSPECIFIED WHETHER LONG TERM INSULIN USE (H): ICD-10-CM

## 2023-05-24 ASSESSMENT — ACTIVITIES OF DAILY LIVING (ADL): DEPENDENT_IADLS:: INDEPENDENT

## 2023-05-24 NOTE — LETTER
[facility]  Patient Centered Plan of Care  About Me:        Patient Name:  Johan Cole    YOB: 1965  Age:         57 year old   [place] MRN:    [id number] Telephone Information:  Home Phone [phone]   Mobile [phone]       Address:  [address]  [place] [address] Email address:  [email or web address]      Emergency Contact(s)    Name Relationship Lgl Grd Work Phone Home Phone Mobile Phone   1. THEO COLE Father   [phone]            Primary language:  English     needed? No   [place] [facility]:  [phone] op. 1  Other communication barriers:None    Preferred Method of Communication:     Current living arrangement: I live in a private home with family    Mobility Status/ Medical Equipment: Independent        Health Maintenance  Health Maintenance Reviewed: Due/Overdue   Health Maintenance Due   Topic Date Due    Yearly Preventive Visit  Never done    Diabetic Foot Exam  Never done    Discuss Advance Care Planning  Never done    Eye Exam  Never done    Pneumococcal Vaccine (1 - PCV) Never done    Colorectal Cancer Screening  Never done    Hepatitis B Vaccine (3 of 3 - 19+ 3-dose series) 12/27/2016    Zoster (Shingles) Vaccine (1 of 2) Never done    Flu Vaccine (1) 09/01/2022    COVID-19 Vaccine (4 - Moderna series) 09/29/2022      ILIANA Clemente   Care Coordination Team  [phone]        My Access Plan  Medical Emergency 911   Primary Clinic Line [facility] [phone]   24 Hour Appointment Line [phone] or  [address] [place] [address] [phone] (toll-free)   24 Hour Nurse Line [phone] (toll-free)   Preferred Urgent Care No data recorded   Preferred Hospital [facility]  [phone]       Preferred Pharmacy [id number] - [place], [place] - [address]     Behavioral Health Crisis Line The National Suicide Prevention Lifeline at 1-286.699.5122 or Text/Call 340              My Care Team Members  Patient Care Team         Relationship Specialty Notifications Start End    Ap Alvarez MD PCP - General Family Medicine  4/7/22     Phone: [phone] Fax: [phone]         [address] [address]    Ap Alvarez MD Assigned PCP   3/17/22     Phone: [phone] Fax: [phone]         [address] [place] [address]    Juanita Worthington LSW Lead Care Coordinator Primary Care - CC Admissions 5/23/23     Phone: [phone]                   My Care Plans  Self Management and Treatment Plan  Care Plan  Care Plan: Housing Instability       Problem: SDOH LACK OF STABLE HOUSING       Goal: Establish Stable Housing       Start Date: 5/23/2023 Expected End Date: 9/29/2023    Priority: High    Note:     Barriers: Will no longer have housing as of 6/9/23  Strengths: Good family support, in process of applying for SMRT  Patient expressed understanding of goal: Yes  Action steps to achieve this goal:  1. I will have a [place] Choices Assessment on 8/22/23 to see if I qualify for waiver services  2. I will apply for Section 8 and/or workforce housing.  3.  I will reach out to [employer] [facility] if I need food resources and to see what other resources they may have.  4.  I will explore with my family staying in a RV Park during the summer months while I work on housing.                                 Action Plans on File:                       Advance Care Plans/Directives Type:   Honoring Choices    Advance Care Planning and Health Care Directives  When it comes to decisions about your health care, it s important that your voice is heard. You may not always be able to speak for yourself.    We encourage you to have discussions with your loved ones, cultural or spiritual leaders and health care providers about your goals, values, beliefs and choices.    We are a part of [employer]ices [place] , supporting and promoting the benefits of  advance care planning conversations.    Our goals are to:  Help you make informed decisions about your healthcare choices and ensure that those choices are honored  Offer advance care planning discussions with trained staff  Ensure your choices are clearly defined, documented and available in your medical record  Translate your choices into medical orders as needed    Why is Advance Care Planning important?  Know what your health care choices are and decide what is right for you  An unexpected illness or injury could leave you unable to participate in important treatment decisions  When choices are left to others to decide that responsibility can be difficult and stressful  By discussing and outlining your choices, your voice is heard in the care you want to receive    How can I learn more?  We offer free classes at multiple locations, days and times. Our trained facilitators will provide information and guide you through a Health Care Directive document. They can also review, notarize and add your document to your medical record.    Call [employer] at [phone] or toll free at [phone] for assistance.     My Medical and Care Information  Problem List   Patient Active Problem List   Diagnosis    Post-traumatic osteoarthritis of left knee    DONA (obstructive sleep apnea)    S/P complete thyroidectomy    Benign essential hypertension    Morbid obesity (H)    Hyperlipidemia LDL goal <100    Type 2 diabetes mellitus with hyperglycemia, with long-term current use of insulin (H)    Klinefelter's syndrome    Hypogonadism in male    Learning disability    Microalbuminuria      Current Medications and Allergies:  See printed Medication Report.    Care Coordination Start Date: 5/22/2023   Frequency of Care Coordination: monthly       Form Last Updated: 05/30/2023

## 2023-05-24 NOTE — LETTER
[facility] CARE COORDINATION    May 30, 2023    Johan Cole  2006 [facility] [address]      Dear Johan,        I am a  clinic care coordinator who works with Ap Alvarez MD with the [facility]. I wanted to thank you for spending the time to talk with me.  Below is a description of clinic care coordination and how I can further assist you.       The clinic care coordination team is made up of a registered nurse, , financial resource worker and community health worker who understand the health care system. The goal of clinic care coordination is to help you manage your health and improve access to the health care system. Our team works alongside your provider to assist you in determining your health and social needs. We can help you obtain health care and community resources, providing you with necessary information and education. We can work with you through any barriers and develop a care plan that helps coordinate and strengthen the communication between you and your care team.  Our services are voluntary and are offered without charge to you personally.    Please feel free to contact me with any questions or concerns regarding care coordination and what we can offer.      We are focused on providing you with the highest-quality healthcare experience possible.    Sincerely,     ILIANA Clemente   Care Coordination Team  [phone]      Enclosed: I have enclosed a copy of the Patient Centered Plan of Care. This has helpful information and goals that we have talked about. Please keep this in an easy to access place to use as needed.

## 2023-05-26 RX ORDER — METFORMIN HCL 500 MG
1000 TABLET, EXTENDED RELEASE 24 HR ORAL 2 TIMES DAILY WITH MEALS
Qty: 360 TABLET | Refills: 0 | Status: SHIPPED | OUTPATIENT
Start: 2023-05-26 | End: 2023-08-25

## 2023-05-26 NOTE — TELEPHONE ENCOUNTER
Prescription approved per [facility] Refill Protocol.  Future Appointments 5/26/2023 - 11/22/2023      Date Visit Type Length Department Provider     6/15/2023 10:00 AM OFFICE VISIT 30 min RM Ap Rivera MD    Location Instructions:     [facility] is located at [address]. This is a half mile west of [address] [facility]. If traveling west on [address], turn south onto [address], then west onto [address] [place] [address] to reach the parking lot. If traveling east on [address], turn south directly onto [address].              7/14/2023 11:00 AM LAB 15 min RM LABORATORY RM LAB    Location Instructions:     The clinic is located at [address] in [place].&nbsp; We offer free parking in our on-site lot.                 Homa Gomez RN, BSN  [facility]

## 2023-05-30 NOTE — PROGRESS NOTES
Clinic Care Coordination Contact    Clinic Care Coordination Contact  OUTREACH    Referral Information:  Referral Source: Care Team    Primary Diagnosis: Other (include Comment box)    Chief Complaint   Patient presents with     Clinic Care Coordination - Initial     Housing Resources        Universal Utilization: 68 % Admission or ED Risk  Clinic Utilization  Difficulty keeping appointments:: No  Compliance Concerns: No  No-Show Concerns: No  No PCP office visit in Past Year: No  Utilization    Hospital Admissions  0             ED Visits  0             No Show Count (past year)  6                Current as of: 5/26/2023  8:58 PM              Clinical Concerns:  Current Medical Concerns:    Patient Active Problem List   Diagnosis     Post-traumatic osteoarthritis of left knee     DONA (obstructive sleep apnea)     S/P complete thyroidectomy     Benign essential hypertension     Morbid obesity (H)     Hyperlipidemia LDL goal <100     Type 2 diabetes mellitus with hyperglycemia, with long-term current use of insulin (H)     Klinefelter's syndrome     Hypogonadism in male     Learning disability     Microalbuminuria       MARY CC spoke by phone with sister Lynsey Ventura - Psychiatric on file. Pradeep has lived with their dad for the past 10 years after his divorce and dad is moving to [facility] as of 6/9/23.  Johan is working full time but has financial barriers for finding a place of hs own.  Lynsey started the SMRT process for him and [place] on  8[place]22/23 to see if he qualifies for a waiver.  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    Lynsey and her brother are considering using money from the sale of her fathers house to purchase a RV that Johan can use during the sure.  They have looked at some RV poole that require a lot fee of $500.oo per month     We discussed housing options including applying for CDA Section 8 and workforce housing.   "She thinks he can afford only about $500.00 for rent after he pays for Child support .  MAYR PATRICIO gave her information about Housing Link, and suggested looking at room rental.  If he goes on MA he can look in to Housing Stabilization resource.  Lynsey requested information be sent to her via email. Writer communicated the risks of unencrypted electronic communication and the patient and/or patient representative has agreed to accept the risks and receive unencrypted communication related to the information or resources we have discussed. We reviewed that no PHI will be included.  Email address verified with the patient.  Will include electronic communication form for patient/caregiver to complete.    MARY Patricio emailed the following information to her:  Attached is a form that the Housing Stabilization programs need as part of the referral to them.  Recently I had a patient who was working with     [facility]   Services  Housing Stabilization Services in [place]  [employer]. ([employer])    Here are some housing resources that may be of interest.    Housing  [facility] CDA  Rental Assistance - [facility] [place] ([place])  Workforce Housing Residents - [facility] ([facility].org)    Housing Stabilization   [facility] Housing Resources  Housing Stabilization Services / [place] Department [place]  Searching for Housing   For those eligible, Housing Stabilization Services (HSS) is a [place] Medical Assistance benefit to help people with disabilities (including mental illness and substance use disorder) and seniors find and keep housing. o A searchable list of providers may be found at [facility]. In the  What are you looking for?\" box, type  Housing Stabilization\" and then include the city/county where services are to be received. o You can find also find the most current list of providers in the [facility] Provider Directory by " "selecting \"Home and Community Based Services (general)\" for provider type and then selecting \"Housing Stabilization Services\" as the subtype    Hanh Jimenez [place] Rooms for Rent  [employer] List  rooms & shares near [facility]          Current Behavioral Concerns: None noted during this encooubter      Education Provided to patient: CC role, explained what kind of housing resources may be available if he were  to get a waiver.  See above resources emailed to her per her request,        Juanita Worthington LSW     Pain  Pain (GOAL):: No  Health Maintenance Reviewed: Due/Overdue   Health Maintenance Due   Topic Date Due     YEARLY PREVENTIVE VISIT  Never done     DIABETIC FOOT EXAM  Never done     ADVANCE CARE PLANNING  Never done     EYE EXAM  Never done     Pneumococcal Vaccine: Pediatrics (0 to 5 Years) and At-Risk Patients (6 to 64 Years) (1 - PCV) Never done     COLORECTAL CANCER SCREENING  Never done     HEPATITIS B IMMUNIZATION (3 of 3 - 19+ 3-dose series) 12/27/2016     ZOSTER IMMUNIZATION (1 of 2) Never done     INFLUENZA VACCINE (1) 09/01/2022     COVID-19 Vaccine (4 - Moderna series) 09/29/2022     Clinical Pathway: None    Medication Management:  Medication review status: Medications reviewed and no changes reported per patient.             Functional Status:  Dependent ADLs:: Independent  Dependent IADLs:: Independent  Mobility Status: Independent  Fallen 2 or more times in the past year?: No    Living Situation:  Current living arrangement:: I live in a private home with family  Type of residence:: Private home - stairs    Lifestyle & Psychosocial Needs:    Social Determinants of Health     Tobacco Use: High Risk (5/22/2023)    Patient History      Smoking Tobacco Use: Never      Smokeless Tobacco Use: Current      Passive Exposure: Not on file   Alcohol Use: Not on file   Financial Resource Strain: Not on file   Food Insecurity: Not on file   Transportation Needs: Not on file "   Physical Activity: Not on file   Stress: Not on file   Social Connections: Not on file   Intimate Partner Violence: Not on file   Depression: Not at risk (5/22/2023)    PHQ-2      PHQ-2 Score: 2   Housing Stability: Not on file     Inadequate nutrition (GOAL):: No  Tube Feeding: No  Inadequate activity/exercise (GOAL):: No  Significant changes in sleep pattern (GOAL): No  Transportation means:: Family     Sikhism or spiritual beliefs that impact treatment:: No  Mental health DX:: No  Mental health management concern (GOAL):: No  Informal Support system:: Family             Resources and Interventions:  Current Resources:      Community Resources: Financial/Insurance ([facility] assessment on 8/22/23)  Supplies Currently Used at Home: None  Equipment Currently Used at Home: none  Employment Status: employed full-time         Advance Care Plan/Directive  Advanced Care Plans/Directives on file:: No    Referrals Placed: [facility] Resources, Community Resources, Other  (Housing)         Care Plan:  Care Plan: Housing Instability     Problem: SDOH LACK OF STABLE HOUSING     Goal: Establish Stable Housing     Start Date: 5/23/2023 Expected End Date: 9/29/2023    Priority: High    Note:     Barriers: Will no longer have housing as of 6/9/23  Strengths: Good family support, in process of applying for SMRT  Patient expressed understanding of goal: Yes  Action steps to achieve this goal:  1. [place]22[place]23 to see if I qualify for waiver services  2. I will apply for Section 8 and/or workforce housing.  3.  I will reach out to [employer] services if I need food resources and to see what other resources they may have.  4.  I will explore with my family staying in a  Park during the summer months while I work on housing.                          Patient/Caregiver understanding: Yes    Outreach Frequency: monthly  Future Appointments              In 2 weeks Ap Al[facility]  [facility], [place] [facility]    In 1 month RM LAB [facility], [place] [facility]    In 7 months Christian Caldwell, PhD [facility] Neuropsychology [place]          Plan: Johan plans to attend his upcoming appointments.  Lynsey will review what was discussed with Johan.  CHW will outreach again in 3 - 4 weeks..    ILIANA Clemente   Care Coordination Team  [phone]

## 2023-06-12 ENCOUNTER — TELEPHONE (OUTPATIENT)
Dept: FAMILY MEDICINE | Facility: CLINIC | Age: 58
End: 2023-06-12
Payer: COMMERCIAL

## 2023-06-12 NOTE — TELEPHONE ENCOUNTER
Forms/Letter Request    Type of form/letter: Work    Have you been seen for this request: Yes    Do we have the form/letter: Yes:  needs to resend e-mail to [employer] in [facility].     When is form/letter needed by: ASAP    How would you like the form/letter returned: email to [employer] in [facility]     Patient Notified form requests are processed in 3-5 business days:Yes    Okay to leave a detailed message?: Yes at Cell number on file:    Telephone Information:   Mobile

## 2023-06-12 NOTE — TELEPHONE ENCOUNTER
Sent this to PCP initially to see if it was a separate e-mail that was not documented in the chart. Called patient for clarification. Patient says that they had a physical done at [facility] in [place] and that we should have it in our records. Patient did not have fax or phone number for where this needs to be sent. Patient did not know when this physical was completed. Unable to find this physical in chart. Tried to call patient back but VM box was not set up.    Lula Lucio

## 2023-06-15 ENCOUNTER — ANCILLARY PROCEDURE (OUTPATIENT)
Dept: GENERAL RADIOLOGY | Facility: CLINIC | Age: 58
End: 2023-06-15
Attending: FAMILY MEDICINE
Payer: COMMERCIAL

## 2023-06-15 ENCOUNTER — OFFICE VISIT (OUTPATIENT)
Dept: FAMILY MEDICINE | Facility: CLINIC | Age: 58
End: 2023-06-15
Payer: COMMERCIAL

## 2023-06-15 VITALS
HEART RATE: 67 BPM | BODY MASS INDEX: 40.82 KG/M2 | OXYGEN SATURATION: 99 % | TEMPERATURE: 97.4 F | HEIGHT: 65 IN | SYSTOLIC BLOOD PRESSURE: 136 MMHG | DIASTOLIC BLOOD PRESSURE: 78 MMHG | RESPIRATION RATE: 16 BRPM | WEIGHT: 245 LBS

## 2023-06-15 DIAGNOSIS — E11.65 TYPE 2 DIABETES MELLITUS WITH HYPERGLYCEMIA, WITH LONG-TERM CURRENT USE OF INSULIN (H): Primary | ICD-10-CM

## 2023-06-15 DIAGNOSIS — M25.551 HIP PAIN, RIGHT: ICD-10-CM

## 2023-06-15 DIAGNOSIS — Z59.00 HOMELESS: ICD-10-CM

## 2023-06-15 DIAGNOSIS — Z79.4 TYPE 2 DIABETES MELLITUS WITH HYPERGLYCEMIA, WITH LONG-TERM CURRENT USE OF INSULIN (H): Primary | ICD-10-CM

## 2023-06-15 DIAGNOSIS — E78.5 HYPERLIPIDEMIA LDL GOAL <100: ICD-10-CM

## 2023-06-15 PROCEDURE — 73502 X-RAY EXAM HIP UNI 2-3 VIEWS: CPT | Mod: TC | Performed by: RADIOLOGY

## 2023-06-15 PROCEDURE — 99214 OFFICE O/P EST MOD 30 MIN: CPT | Performed by: FAMILY MEDICINE

## 2023-06-15 RX ORDER — ATORVASTATIN CALCIUM 40 MG/1
40 TABLET, FILM COATED ORAL DAILY
Qty: 90 TABLET | Refills: 3 | Status: SHIPPED | OUTPATIENT
Start: 2023-06-15 | End: 2024-03-07

## 2023-06-15 RX ORDER — ASPIRIN 81 MG/1
81 TABLET ORAL DAILY
Qty: 90 TABLET | Refills: 3 | Status: SHIPPED | OUTPATIENT
Start: 2023-06-15 | End: 2024-03-07

## 2023-06-15 SDOH — ECONOMIC STABILITY - HOUSING INSECURITY: HOMELESSNESS UNSPECIFIED: Z59.00

## 2023-06-15 ASSESSMENT — ENCOUNTER SYMPTOMS
ARTHRALGIAS: 1
CONSTITUTIONAL NEGATIVE: 1
PALPITATIONS: 0
HEADACHES: 0
SHORTNESS OF BREATH: 0

## 2023-06-15 ASSESSMENT — PAIN SCALES - GENERAL: PAINLEVEL: NO PAIN (1)

## 2023-06-15 NOTE — PROGRESS NOTES
Assessment and Plan    (E11.65,  Z79.4) Type 2 diabetes mellitus with hyperglycemia, with long-term current use of insulin (H)  (primary encounter diagnosis)  Comment: has not been on ASA, starting this.  Other meds restarted 1m ago, too soon to recehck A1c  Plan: aspirin 81 MG EC tablet            (Z59.00) Homeless  Comment:   Plan: Primary Care - Care Coordination Referral            (E78.5) Hyperlipidemia LDL goal <100  Comment: starting meds  Plan: atorvastatin (LIPITOR) 40 MG tablet            (M25.551) Hip pain, right  Comment: s/p R THR, Rx NSAID, refer to ortho for eval  Plan: XR Hip Right 2-3 Views, Orthopedic          Referral, diclofenac (VOLTAREN) 50 MG EC tablet              RTC in 2m for DM check    Ap Alvarez MD      Benita Prado is a 57 year old, presenting for the following health issues:  Hypertension        6/15/2023     9:50 AM   Additional Questions   Roomed by minesh ortega cma   Accompanied by Sister           6/15/2023     9:50 AM   Patient Reported Additional Medications   Patient reports taking the following new medications na     History of Present Illness       Diabetes:   He presents for follow up of diabetes.  He is not checking blood glucose. He has no concerns regarding his diabetes at this time.  He is not experiencing numbness or burning in feet, excessive thirst, blurry vision, weight changes or redness, sores or blisters on feet. The patient has not had a diabetic eye exam in the last 12 months.         Hyperlipidemia:  He presents for follow up of hyperlipidemia.  He is taking medication to lower cholesterol. He is not having myalgia or other side effects to statin medications.    Hypertension: He presents for follow up of hypertension.  He does not check blood pressure  regularly outside of the clinic. Outpatient blood pressures have not been over 140/90. He follows a low salt diet.     He eats 0-1 servings of fruits and vegetables daily.He consumes 1  "sweetened beverage(s) daily.He exercises with enough effort to increase his heart rate 9 or less minutes per day.  He exercises with enough effort to increase his heart rate 7 days per week.   He is taking medications regularly.     Wants to discuss medications for joints, he has issues with his knees getting in and out of cars.R hip to R knee, feels the entire area is afftected and pain does not radiate.  Has been present for about 6m.  Has difficulty raising his leg but does not drag his leg.  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  Did have an epidural injection into his R low back about 8m ago, didn't help. No incident or injury that he links to this starting.  S/p R hip replacement ca 10 y ago.    Concern with toenails - going on for years. Feels stefani toenails are thick and he has hard time getting on socks     Currently homeless.  Sister is helping, working with [employer],  assessment pending.    Tolerating metformin, no issues with side effects.  Restarted thyroid medication.  Does not check blood sugars.      Review of Systems   Constitutional: Negative.    Eyes: Negative for visual disturbance.   Respiratory: Negative for shortness of breath.    Cardiovascular: Negative for chest pain, palpitations and peripheral edema.   Musculoskeletal: Positive for arthralgias.   Neurological: Negative for headaches.            Objective    /78 (BP Location: Right arm, Patient Position: Sitting, Cuff Size: Adult Large)   Pulse 67   Temp 97.4  F (36.3  C) (Oral)   Resp 16   Ht 1.638 m (5' 4.5\")   Wt 111.1 kg (245 lb)   SpO2 99%   BMI 41.40 kg/m    Body mass index is 41.4 kg/m .  Physical Exam  Vitals and nursing note reviewed.   HENT:      Head: Normocephalic.   Eyes:      Conjunctiva/sclera: Conjunctivae normal.   Cardiovascular:      Rate and Rhythm: Normal rate and regular rhythm.      Heart sounds: Normal heart sounds.   Pulmonary:      Effort: Pulmonary effort is normal.     "  Breath sounds: Normal breath sounds.   Musculoskeletal:      Right hip: No tenderness or crepitus. Normal range of motion.   Skin:     General: Skin is warm and dry.   Neurological:      General: No focal deficit present.      Mental Status: He is alert and oriented to person, place, and time.   Psychiatric:         Mood and Affect: Mood normal.         Behavior: Behavior normal.

## 2023-06-27 ENCOUNTER — PATIENT OUTREACH (OUTPATIENT)
Dept: CARE COORDINATION | Facility: CLINIC | Age: 58
End: 2023-06-27
Payer: COMMERCIAL

## 2023-06-27 ENCOUNTER — TELEPHONE (OUTPATIENT)
Dept: CARE COORDINATION | Facility: CLINIC | Age: 58
End: 2023-06-27
Payer: COMMERCIAL

## 2023-06-27 NOTE — TELEPHONE ENCOUNTER
Patient has reported new swelling in his lower extremities as of one week ago. Wondering if care team as any recommendations.     Patients sister is Lynsey [phone] please call her if you are unable to get a hold of patient.     Thank you     Fabiola Correia, VICKIE, B.S. [facility] Care Coordination  [facility]:  [place], [place] and [place]  [phone]  [email or web address]

## 2023-06-27 NOTE — PROGRESS NOTES
Clinic Care Coordination Contact  Community Health Worker Follow Up    Care Gaps:     Health Maintenance Due   Topic Date Due     YEARLY PREVENTIVE VISIT  Never done     DIABETIC FOOT EXAM  Never done     ADVANCE CARE PLANNING  Never done     EYE EXAM  Never done     Pneumococcal Vaccine: Pediatrics (0 to 5 Years) and At-Risk Patients (6 to 64 Years) (1 - PCV) Never done     COLORECTAL CANCER SCREENING  Never done     HEPATITIS B IMMUNIZATION (3 of 3 - 19+ 3-dose series) 09/21/2016     ZOSTER IMMUNIZATION (1 of 2) Never done     COVID-19 Vaccine (4 - Moderna series) 09/29/2022       Will discuss at next outreach    Care Plan:   Care Plan: Housing Instability     Problem: SDOH LACK OF STABLE HOUSING     Goal: Establish Stable Housing     Start Date: 5/23/2023 Expected End Date: 9/29/2023    This Visit's Progress: 10%    Priority: High    Note:     Barriers: Will no longer have housing as of 6/9/23  Strengths: Good family support, in process of applying for SMRT  Patient expressed understanding of goal: Yes  Action steps to achieve this goal:  1. [place]22/23 to see if I qualify for waiver services  2. I will apply for Section 8 and/or workforce housing.  3.  I will reach out to [employer] if I need food resources and to see what other resources they may have.  4.  I will explore with my family staying in a [address] [place] during the summer months while I work on housing.                          Intervention and Education during outreach: Spoke with patients sister, Lynsey. Patient has been sleeping in his car, they have been talking with [employer] services trying to get into hotel program. They are hoping by July 5th to get in. Then, will be able to be there for 90 days.   Lynsey has also helped get him in touch with [employer] for food.    Reviewed that patients neuropsych eval is 7/11 in [place], [place] to help [place] process[place]      Lynsey states that patient has been noticing swelling for over a week in lower legs and feet- CHWrouted message to triage.     Lynsey has been unable to get patient on A housing waitlist due to the website not working. Wondering if CC team can for him.  CHW called [facility] and learned that if patients are having issues applying online they can email [email or web address] to help. Notified Lynsey of this.     CHW Plan: Next outreach in one month.    VICKIE Payne, B.S. [facility]  Clinic Care Coordination  [facility]:  [place], [place] and [place]  [phone]  [email or web address]

## 2023-06-28 NOTE — TELEPHONE ENCOUNTER
Tried calling pt twice - no answer, no voicemail.     Calling to sister- pt called her said that it happened at the end of last week. Swelling in both extremities.   Pt is currently sleeping in car  In morning will hang out w/ Dad at [facility] center, but cannot sleep there.   Pt works 3-10 p     Adv sister w/out talking to him hard to give guidance.   Redness/streaking, pain, weeping, etc should be seen  Also could try sleeping in back seat, increase water intake, compression stockings at work (if air conditioned).   Call and speak w/ NURSE - 24 hr     Phylicia WILKES RN

## 2023-07-06 ENCOUNTER — PATIENT OUTREACH (OUTPATIENT)
Dept: CARE COORDINATION | Facility: CLINIC | Age: 58
End: 2023-07-06
Payer: COMMERCIAL

## 2023-07-06 ASSESSMENT — ACTIVITIES OF DAILY LIVING (ADL): DEPENDENT_IADLS:: INDEPENDENT

## 2023-07-06 NOTE — PROGRESS NOTES
Clinic Care Coordination Contact  Care Coordination Clinician Chart Review    Situation: Patient chart reviewed by Care Coordinator.       Background: Care Coordination Program started: 5/22/2023. Initial assessment completed and patient-centered care plan(s) were developed with participation from patient. Lead CC handed patient off to CHW for continued outreaches.       Assessment: Per chart review, patient outreach completed by CC CHW on 6/27/2023.  Patient is actively working to accomplish goal(s). Patient's goal(s) appropriate and relevant at this time. Patient is not due for updated Plan of Care.  Assessments will be completed annually or as needed/with change of patient status.      Care Plan: Housing Instability     Problem: SDOH LACK OF STABLE HOUSING     Goal: Establish Stable Housing     Start Date: 5/23/2023 Expected End Date: 9/29/2023    This Visit's Progress: 10%    Priority: High    Note:     Barriers: Will no longer have housing as of 6/9/23  Strengths: Good family support, in process of applying for SMRT  Patient expressed understanding of goal: Yes  Action steps to achieve this goal:  1. [place] to see if I qualify for waiver services  2. I will apply for Section 8 and/or workforce housing.  3.  I will reach out to [employer] services if I need food resources and to see what other resources they may have.  4.  I will explore with my family staying in a RV Park during the summer months while I work on housing.                             Plan/Recommendations: The patient will continue working with Care Coordination to achieve goal(s) as above. CHW will continue outreaches at minimum every 30 days and will involve Lead CC as needed or if patient is ready to move to Maintenance. Lead CC will continue to monitor CHW outreaches and patient's progress to goal(s) every 6 weeks.     Plan of Care updated and sent to patient: ILIANA Harris  Clinic Care  Coordinator  [facility]  [phone]  [email or web address]

## 2023-07-17 ENCOUNTER — TELEPHONE (OUTPATIENT)
Dept: FAMILY MEDICINE | Facility: CLINIC | Age: 58
End: 2023-07-17
Payer: COMMERCIAL

## 2023-07-17 NOTE — TELEPHONE ENCOUNTER
Patient calls stating that he needs to have dental work done and his dentist is requesting his Hgb A1C results. Reviewed most recent A1C result from 5/22/23 with patient:    Lab Results   Component Value Date    A1C 7.4 05/22/2023       Advised patient to call the clinic if additional information is needed or if dentist requests that results be faxed. Patient verbalized understanding and agrees with plan.    Nilsa SCANLON RN, BSN, PHN

## 2023-08-01 ENCOUNTER — PATIENT OUTREACH (OUTPATIENT)
Dept: CARE COORDINATION | Facility: CLINIC | Age: 58
End: 2023-08-01
Payer: COMMERCIAL

## 2023-08-01 NOTE — PROGRESS NOTES
Clinic Care Coordination Contact  [facility]       Clinical Data: Care Coordinator Outreach  Outreach attempted x 1.  Spoke with patient's sister, Lynsey. She is currently in a meeting and asks if she can call CHW back. CHW gave Lynsey my phone number.  Plan: Care Coordinator will wait for Lynsey to return my phone call.    Shelly Bray  Community Health Worker  [facility]  [phone]    How is your brother?  How is the SMRT process going?  Did your brother get on the [facility] [place]?  [place]23.

## 2023-08-01 NOTE — PROGRESS NOTES
Clinic Care Coordination Contact  Community Health Worker Follow Up    Care Gaps: Did not discuss. Lynsey, pt's sister, is aware of pt's upcoming visit with Dr. Alvarez 8/24/23.    Health Maintenance Due   Topic Date Due    YEARLY PREVENTIVE VISIT  Never done    DIABETIC FOOT EXAM  Never done    ADVANCE CARE PLANNING  Never done    EYE EXAM  Never done    Pneumococcal Vaccine: Pediatrics (0 to 5 Years) and At-Risk Patients (6 to 64 Years) (1 - PCV) Never done    COLORECTAL CANCER SCREENING  Never done    HEPATITIS B IMMUNIZATION (3 of 3 - 19+ 3-dose series) 09/21/2016    ZOSTER IMMUNIZATION (1 of 2) Never done    COVID-19 Vaccine (4 - Moderna series) 09/29/2022       Care Plan:   Care Plan: Housing Instability       Problem: SDOH LACK OF STABLE HOUSING       Goal: Establish Stable Housing       Start Date: 5/23/2023 Expected End Date: 9/29/2023    This Visit's Progress: 30% Recent Progress: 10%    Priority: High    Note:     Barriers: Will no longer have housing as of 6/9/23  Strengths: Good family support, in process of applying for SMRT  Patient expressed understanding of goal: Yes  Action steps to achieve this goal:  1. [place] 8[place]22/23 to see if I qualify for waiver services  2. I will apply for Section 8 and/or workforce housing.  3.  I will reach out to [place] [facility] services if I need food resources and to see what other resources they may have.  4.  I will explore with my family staying in a [place] during the summer months while I work on housing.                                Intervention and Education during outreach:   Spoke with Lynsey, pt's sister, this afternoon. Pt [facility] supportive services was not able to continue working with pt, so they provided coordinated entry into the [facility] Program. Pt has been living at an [facility] in [place] since 7/20/23; he will stay for 90 days. Pt and Lynsey will be meeting with a  weekly through  the [facility] to create a service exit plan.   Neuropsych eval completed on 7/10/23. 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 Pt's overall adaptive functionality IQ is at 56. The neuropsych recommendation was that pt would qualify for disability services.  [place] 8/22/23. The long term goal would be that pt qualify for DD waiver.     CHW asks if Lynsey has any further concerns or need for resources as this time. Lynsey states she does not. Lynsey feels that most of pt's needs for food and shelter have been met through assistance of SW from the [facility] Program.    CHW Plan: CHW will follow up with pt in one month.    Shelly Bray  Community Health Worker  [facility]  [phone]

## 2023-08-21 ENCOUNTER — PATIENT OUTREACH (OUTPATIENT)
Dept: CARE COORDINATION | Facility: CLINIC | Age: 58
End: 2023-08-21
Payer: COMMERCIAL

## 2023-08-21 ASSESSMENT — ACTIVITIES OF DAILY LIVING (ADL): DEPENDENT_IADLS:: INDEPENDENT

## 2023-08-21 NOTE — LETTER
[facility] CARE COORDINATION  [address]  [facility] [address]    August 21, 2023        Johan Cole  [address] [address]          Dear Patricia Prado is an updated Patient Centered Plan of Care for your continued enrollment in Care Coordination. Please let us know if you have additional questions, concerns, or goals that we can assist with.    Sincerely,    Devan Keenan \[facility] [place] [facility]\""  Clinic Care Coordinator  [facility]  [phone]  [email or web address]  Patient Centered Plan of Care  About Me:        Patient Name:  Johan Cole    YOB: 1965  Age:         57 year old   [facility] MRN:    [id number] Telephone Information:  Home Phone [phone]   Mobile [phone]       Address:  [address] [address] Email address:  [email or web address]      Emergency Contact(s)    Name Relationship Lgl Grd Work Phone Home Phone Mobile Phone   1. THEO COLE Father   [phone]            Primary language:  English     needed? No   [facility]:  [phone] op. 1  Other communication barriers:None    Preferred Method of Communication:     Current living arrangement: I live in a private home with family    Mobility Status/ Medical Equipment: Independent        Health Maintenance  Health Maintenance Reviewed: Due/Overdue   Health Maintenance Due   Topic Date Due    YEARLY PREVENTIVE VISIT  Never done    DIABETIC FOOT EXAM  Never done    ADVANCE CARE PLANNING  Never done    EYE EXAM  Never done    Pneumococcal Vaccine: Pediatrics (0 to 5 Years) and At-Risk Patients (6 to 64 Years) (1 - PCV) Never done    COLORECTAL CANCER SCREENING  Never done    HEPATITIS B IMMUNIZATION (3 of 3 - 19+ 3-dose series) 09/21/2016    ZOSTER IMMUNIZATION (1 of 2) Never done    COVID-19 Vaccine (4 - Moderna  series) 09/29/2022    A1C  08/22/2023           My Access Plan  Medical Emergency 911   Primary Clinic Line [facility] - [phone]   24 Hour Appointment Line [phone] or  [phone]) (toll-free)   24 Hour Nurse Line [phone] (toll-free)   Preferred Urgent Care No data recorded   Preferred Hospital [facility]  [phone]     Preferred Pharmacy [id number] - [place]Y Jimenez - [address] [place]     Behavioral Health Crisis Line The National Suicide Prevention Lifeline at 1-166.463.5585 or Text/Call 048             My Care Team Members  Patient Care Team         Relationship Specialty Notifications Start End    Ap Alvarez MD PCP - General Family Medicine  4/7/22     Phone: [phone] Fax: [phone]         [address] [place] [address]    Ap Alvarez MD Assigned PCP   3/17/22     Phone: [phone] Fax: [phone]         [address] [place] [address]    Devan Keenan LSW Clinic Care Coordinator Primary Care - CC Admissions 6/7/23     Phone: [phone]         Shelly Bray Community Health Worker  Admissions 7/12/23     Phone: [phone]                   My Care Plans  Self Management and Treatment Plan  Care Plan  Care Plan: Housing Instability       Problem: SDOH LACK OF STABLE HOUSING       Goal: Establish Stable Housing       Start Date: 5/23/2023 Expected End Date: 9/29/2023    This Visit's Progress: 30% Recent Progress: 10%    Priority: High    Note:     Barriers: Will no longer have housing as of 6/9/23  Strengths: Good family support, in process of applying for SMRT  Patient expressed understanding of goal: Yes  Action steps to achieve this goal:  1. [place]22[place]23 to see if I qualify for waiver services  2. I will apply for Section 8 and/or workforce housing.  3.  I will reach out to [facility] services if I need food resources and to  see what other resources they may have.  4.  I will explore with my family staying in a [place] during the summer months while I work on housing.                                     My Medical and Care Information  Problem List   Patient Active Problem List   Diagnosis    Post-traumatic osteoarthritis of left knee    DONA (obstructive sleep apnea)    S/P complete thyroidectomy    Benign essential hypertension    Morbid obesity (H)    Hyperlipidemia LDL goal <100    Type 2 diabetes mellitus with hyperglycemia, with long-term current use of insulin (H)    Klinefelter's syndrome    Hypogonadism in male    Learning disability    Microalbuminuria      Current Medications and Allergies:   No Known Allergies  Current Outpatient Medications   Medication    aspirin 81 MG EC tablet    atorvastatin (LIPITOR) 40 MG tablet    chlorthalidone (HYGROTON) 25 MG tablet    diclofenac (VOLTAREN) 50 MG EC tablet    levothyroxine (SYNTHROID/LEVOTHROID) 100 MCG tablet    lisinopril (ZESTRIL) 40 MG tablet    metFORMIN (GLUCOPHAGE XR) 500 MG 24 hr tablet     No current facility-administered medications for this visit.         Care Coordination Start Date: 5/22/2023   Frequency of Care Coordination: monthly     Form Last Updated: 08/21/2023

## 2023-08-21 NOTE — PROGRESS NOTES
Clinic Care Coordination Contact  Care Coordination Clinician Chart Review    Situation: Patient chart reviewed by Care Coordinator.       Background: Care Coordination Program started: 5/22/2023. Initial assessment completed and patient-centered care plan(s) were developed with participation from patient. Lead CC handed patient off to CHW for continued outreaches.       Assessment: Per chart review, patient outreach completed by CC CHW on 8/1/2023.  Patient is actively working to accomplish goal(s). Patient's goal(s) appropriate and relevant at this time. Patient is due for updated Plan of Care.  Assessments will be completed annually or as needed/with change of patient status.      Care Plan: Housing Instability       Problem: SDOH LACK OF STABLE HOUSING       Goal: Establish Stable Housing       Start Date: 5/23/2023 Expected End Date: 9/29/2023    This Visit's Progress: 30% Recent Progress: 10%    Priority: High    Note:     Barriers: Will no longer have housing as of 6/9/23  Strengths: Good family support, in process of applying for SMRT  Patient expressed understanding of goal: Yes  Action steps to achieve this goal:  1. [place]22/23 to see if I qualify for waiver services  2. I will apply for Section 8 and/or workforce housing.  3.  I will reach out to [place] [facility] services if I need food resources and to see what other resources they may have.  4.  I will explore with my family staying in a [place] during the summer months while I work on housing.                                   Plan/Recommendations: The patient will continue working with Care Coordination to achieve goal(s) as above. CHW will continue outreaches at minimum every 30 days and will involve Lead CC as needed or if patient is ready to move to Maintenance. Lead CC will continue to monitor CHW outreaches and patient's progress to goal(s) every 6 weeks.     Plan of Care updated and sent to patient: Yes, via  mail    Devan Keenan [facility]  Clinic Care Coordinator  [facility]  [phone]  [email or web address]

## 2023-08-24 ENCOUNTER — OFFICE VISIT (OUTPATIENT)
Dept: FAMILY MEDICINE | Facility: CLINIC | Age: 58
End: 2023-08-24
Attending: FAMILY MEDICINE
Payer: COMMERCIAL

## 2023-08-24 DIAGNOSIS — E11.69 TYPE 2 DIABETES MELLITUS WITH OTHER SPECIFIED COMPLICATION, UNSPECIFIED WHETHER LONG TERM INSULIN USE (H): ICD-10-CM

## 2023-08-24 DIAGNOSIS — M25.551 HIP PAIN, RIGHT: ICD-10-CM

## 2023-08-24 DIAGNOSIS — I10 BENIGN ESSENTIAL HYPERTENSION: Primary | ICD-10-CM

## 2023-08-24 DIAGNOSIS — G47.33 OSA (OBSTRUCTIVE SLEEP APNEA): ICD-10-CM

## 2023-08-24 DIAGNOSIS — Z79.4 TYPE 2 DIABETES MELLITUS WITH HYPERGLYCEMIA, WITH LONG-TERM CURRENT USE OF INSULIN (H): ICD-10-CM

## 2023-08-24 DIAGNOSIS — E89.0 S/P COMPLETE THYROIDECTOMY: ICD-10-CM

## 2023-08-24 DIAGNOSIS — E11.65 TYPE 2 DIABETES MELLITUS WITH HYPERGLYCEMIA, WITH LONG-TERM CURRENT USE OF INSULIN (H): ICD-10-CM

## 2023-08-24 LAB
HBA1C MFR BLD: 10.6 % (ref 0–5.6)
TSH SERPL DL<=0.005 MIU/L-ACNC: 3.99 UIU/ML (ref 0.3–4.2)

## 2023-08-24 PROCEDURE — 83036 HEMOGLOBIN GLYCOSYLATED A1C: CPT | Performed by: FAMILY MEDICINE

## 2023-08-24 PROCEDURE — 84443 ASSAY THYROID STIM HORMONE: CPT | Performed by: FAMILY MEDICINE

## 2023-08-24 PROCEDURE — 36415 COLL VENOUS BLD VENIPUNCTURE: CPT | Performed by: FAMILY MEDICINE

## 2023-08-24 PROCEDURE — 99214 OFFICE O/P EST MOD 30 MIN: CPT | Performed by: FAMILY MEDICINE

## 2023-08-24 NOTE — LETTER
August 25, 2023      Johan Cole  [address] [address]        Johan,     Your hemoglobin A1c is up sharply.  Have you been taking your metformin every day?     We can discuss in more detail at your next visit.     Ap Alvarez MD     Resulted Orders   Hemoglobin A1c   Result Value Ref Range    Hemoglobin A1C 10.6 (H) 0.0 - 5.6 %    Narrative    Result confirmed by repeat test.       TSH with free T4 reflex   Result Value Ref Range    TSH 3.99 0.30 - 4.20 uIU/mL

## 2023-08-25 VITALS
TEMPERATURE: 97.9 F | HEART RATE: 75 BPM | DIASTOLIC BLOOD PRESSURE: 118 MMHG | BODY MASS INDEX: 40.98 KG/M2 | WEIGHT: 246 LBS | SYSTOLIC BLOOD PRESSURE: 175 MMHG | HEIGHT: 65 IN

## 2023-08-25 DIAGNOSIS — I10 BENIGN ESSENTIAL HYPERTENSION: ICD-10-CM

## 2023-08-25 RX ORDER — LISINOPRIL 40 MG/1
40 TABLET ORAL DAILY
Qty: 90 TABLET | Refills: 1 | Status: SHIPPED | OUTPATIENT
Start: 2023-08-25 | End: 2024-03-07

## 2023-08-25 RX ORDER — AMLODIPINE BESYLATE 5 MG/1
5 TABLET ORAL AT BEDTIME
Qty: 30 TABLET | Refills: 1 | Status: SHIPPED | OUTPATIENT
Start: 2023-08-25 | End: 2023-10-31

## 2023-08-25 RX ORDER — CHLORTHALIDONE 25 MG/1
25 TABLET ORAL DAILY
Qty: 90 TABLET | Refills: 1 | Status: SHIPPED | OUTPATIENT
Start: 2023-08-25 | End: 2024-03-04

## 2023-08-25 RX ORDER — METFORMIN HCL 500 MG
1000 TABLET, EXTENDED RELEASE 24 HR ORAL 2 TIMES DAILY WITH MEALS
Qty: 360 TABLET | Refills: 1 | Status: SHIPPED | OUTPATIENT
Start: 2023-08-25 | End: 2024-06-24

## 2023-08-25 ASSESSMENT — ENCOUNTER SYMPTOMS
SHORTNESS OF BREATH: 0
ARTHRALGIAS: 1
PALPITATIONS: 0
CONSTITUTIONAL NEGATIVE: 1
HEADACHES: 0
BACK PAIN: 0

## 2023-08-25 ASSESSMENT — PAIN SCALES - GENERAL: PAINLEVEL: EXTREME PAIN (8)

## 2023-08-25 NOTE — PROGRESS NOTES
"  Assessment and Plan    (I10) Benign essential hypertension  (primary encounter diagnosis)  Comment: Adding amlodipine in the evening  Plan: amLODIPine (NORVASC) 5 MG tablet,         chlorthalidone (HYGROTON) 25 MG tablet,         lisinopril (ZESTRIL) 40 MG tablet            (E11.65,  Z79.4) Type 2 diabetes mellitus with hyperglycemia, with long-term current use of insulin (H)  Comment: A1c up sharply, will address at f/unit(s) appt in 1m  Plan: Hemoglobin A1c, TSH with free T4 reflex            (E89.0) S/P complete thyroidectomy  Comment: checking labs  Plan: Hemoglobin A1c, TSH with free T4 reflex            (G47.33) DONA (obstructive sleep apnea)  Comment:   Plan: sleep referral placed    (M25.551) Hip pain, right  Comment:   Plan: ortho referral      RTC in 1m    Ap Alvarez MD        Benita Prado is a 57 year old, presenting for the following health issues:  Hypertension  Seen with sister, Lynsey.  Johan asks that she be his primary contact.    HPI     C/o R leg pain, R hip pain.  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  Has been an issue for years and is steadily getting worse.  Did see ortho aboot 8m ago and was told this was a pinched nerve, they did a back injection which didn't really help.  But doesn't recall anything else about plan.  No info about plan.      Took BP medication about 3h ago.      Has hx of DONA, doesn not have or use CPAP currently.    Will be getting establilshed with [facility] for adult with DD.      Review of Systems   Constitutional: Negative.    Eyes:  Negative for visual disturbance.   Respiratory:  Negative for shortness of breath.    Cardiovascular:  Negative for chest pain, palpitations and peripheral edema.   Musculoskeletal:  Positive for arthralgias. Negative for back pain.   Neurological:  Negative for headaches.            Objective    BP (!) 175/118   Pulse 75   Temp 97.9  F (36.6  C)   Ht 1.651 m (5' 5\")   Wt 111.6 kg (246 lb)   BMI 40.94 kg/m  "   Body mass index is 40.94 kg/m .  Physical Exam  Vitals and nursing note reviewed.   HENT:      Head: Normocephalic.   Eyes:      Conjunctiva/sclera: Conjunctivae normal.   Cardiovascular:      Rate and Rhythm: Normal rate and regular rhythm.      Heart sounds: Normal heart sounds.   Pulmonary:      Effort: Pulmonary effort is normal.      Breath sounds: Normal breath sounds.   Musculoskeletal:      Right hip: No tenderness or crepitus. Normal range of motion. Normal strength.   Skin:     General: Skin is warm and dry.   Neurological:      General: No focal deficit present.      Mental Status: He is alert and oriented to person, place, and time.   Psychiatric:         Mood and Affect: Mood normal.         Behavior: Behavior normal.

## 2023-08-28 RX ORDER — CHLORTHALIDONE 25 MG/1
25 TABLET ORAL DAILY
Qty: 30 TABLET | Refills: 0 | OUTPATIENT
Start: 2023-08-28

## 2023-09-07 NOTE — PROGRESS NOTES
ASSESSMENT & PLAN       Today we discussed the underlying etiology/pathology of patient's   1. Primary osteoarthritis of left knee    2. Morbid obesity (H)    3. Type 2 diabetes mellitus with hyperglycemia, with long-term current use of insulin (H)    4. Klinefelter's syndrome    5. S/P complete thyroidectomy    6. Hip pain, right    7. Hx of arthroscopy of left knee      -We had a very long discussion today with the patient and patient's father the patient has bone-on-bone medial compartment osteoarthritis with varus deformity.  Mild degenerative changes noted of the patellofemoral articulation.  - Patient has numerous medical comorbidities including uncontrolled diabetes with hemoglobin A1c recently of 10.8.  Patient has restarted taking his metformin as directed but is not using insulin and needs to have close monitoring of this condition and follow-up with his primary care team Dr. Alvarez.  - We discussed that patient needs to be medically optimized before surgical consideration usually with hemoglobin A1c under 7.5 as well as stabilization of all of his other medical conditions  - Patient is not interested in further injection therapy including cortisone today and we did discuss that this would delay any surgical intervention for a minimum of 3 to 4 months due to increased risk of surgical infection  - Patient previously was prescribed Voltaren 1 tablet twice daily and this was refilled today as original prescription would have ran out in August 2023 if taking it appropriately.  Patient does not know if he has this prescription bottle still at home or any volume of it left  - Patient is not interested in continuing care for his left knee at [facility] as he believes Dr. Doan who might have retired who was originally to do his left knee replacement and this is why surgery wasn't completed.  Orthopedic  referral made for the patient to establish with one of our total joint arthroplasty surgeons  for knee partial versus full replacement.  Patient understood MRI of the left knee may be warranted to evaluate the lateral and patellofemoral compartments.  - Patient was to sign a release of information/medical records from [facility] in regards to his left knee prior to being seen for his surgical consultation for his left knee  - Patient may work without restrictions as he has a chronic longstanding problem in regards to his left knee  - In regards to his chronic back and right hip issues he should follow-up with Dr. Tenorio at [facility] for his spine as recommended previously and should at least make a phone call to [facility] where he had his hip replaced.    -Call direct clinic number [[phone]] at any time with questions or concerns in regards to your recent office visit with me.     Primo Mathias PA-C  [facility]        SUBJECTIVE  Johan MARIA T Cole is a/an 57 year old male who is seen in consultation at the request of  Ap Alvarez M.D. for evaluation of right hip pain and worse left knee pain. The patient is seen with their father.  Patient has a very extensive orthopedic history which 20 minutes of time was spent reviewing his medical records before visiting personally with the patient.  Patient has a history of right total hip arthroplasty done at [facility] in 2013 with lack of follow-up and no further management in regards to his hip.  Patient has established with [facility] in regards to his left knee and had been seeing Dr. Doan at [facility] with proposed left total knee arthroplasty and patient does not recall why this was not completed as proposed date was August 22, 2023.  Patient does have a history of left knee arthroscopy years ago.  Patient also has established with Dr. Aldridge at [facility] for chronic back issues as well as possible lumbar radiculopathy undergoing L5 spinal injection with diagnostic improvement but  no long-term relief.'s recommendation was to see the patient back for repeat assessment and possible targeting the L4 nerve root after completion of total knee arthroplasty.  Patient has again not reestablished with any other orthopedic provider in regards to continued management of his conditions.   Patient also is a uncontrolled diabetic with previous visits to the emergency department for glucose levels at 1400 requiring insulin drip.  Patient states that he is not checking his blood sugars on a regular basis but is taking his oral metformin but is not using insulin due to cost.  Today patient would like to focus solely on his left knee as his left knee has become more painful over the last 10 to 14 days without injury or trauma.  He currently is not taking any medication in regards to management of his discomfort as he states his primary care provider would not prescribe medicine due to the risk associated with his uncontrolled diabetes.  Patient states that it took him approximately 15 minutes to get from his car to the front lobby today due to his left knee discomfort.  He is accompanied by his father today.  Recent hemoglobin A1c 3 weeks ago was 10.8.  Patient has a previous prescription of Voltaren 50 mg tablets to be taken 1 tablet twice daily original prescription from Dr. Alvarez with 1 refill done in June 2023.  Patient unsure if he still has it or is taking it.  Patient does have a bit of a learning disability related to his Klinefelter syndrome and usually his sister is involved in his care or his father who is present today helping take notes.        Onset: 6 month(s) ago. Reports insidious onset without acute precipitating event.  Location of Pain: left knee - anterior over the patella and medial and lateral to the patella with radiation to the lateral hp  Rating of Pain at worst: 10/10  Rating of Pain Currently: 7/10  Worsened by: walking, avoids stairs, difficulty working  Better with:  none  Treatments tried: no treatment tried to date - has continued to work  Quality: sharp, stabbing   Associated symptoms: swelling, warmth, weakness of both legs - has to lift right leg to get into vehicles, and feeling of instability of the left knee  Orthopedic history: YES - Date: Right total of arthroplasty 2013, [facility].  Scheduled left total knee arthroplasty for 08/22/2023 with loss of orthopedic follow-up for unknown reasons with Dr. Doan at [facility].  Lumbar spine management with steroid injections managed by Dr. Aldridge [facility].  History of left knee arthroscopy [facility] unknown date    Relevant surgical history: YES - Date: 04/2013 Right [place]  Social history: social history: works at [employer]. Unable to play golf or fish for the last 2 years due to pain    No past medical history on file.  Social History     Socioeconomic History    Marital status: Single   Tobacco Use    Smoking status: Never    Smokeless tobacco: Current     Types: Chew   Vaping Use    Vaping Use: Never used   Substance and Sexual Activity    Alcohol use: Yes     Comment: Rare    Drug use: Never    Sexual activity: Not Currently         Patient's past medical, surgical, social, and family histories were personally reviewed today and no changes are noted.    REVIEW OF SYSTEMS:  10 point ROS is negative other than symptoms noted above in HPI, Past Medical History or as stated below  Constitutional: NEGATIVE for fever, chills, change in weight  Skin: NEGATIVE for worrisome rashes, moles or lesions  GI/: NEGATIVE for bowel or bladder changes  Neuro: NEGATIVE for weakness, dizziness or paresthesias    OBJECTIVE:  There were no vitals taken for this visit.   General: healthy, alert and in no distress  HEENT: no scleral icterus or conjunctival erythema  Skin: no suspicious lesions or rash. No jaundice.  CV: no pedal edema  Resp: normal respiratory effort without conversational dyspnea   Psych:  normal mood and affect.  Patient states he does have poor memory and needs a family member with to help with taking notes as well as maintaining medical direction/plan of care  Gait: normal steady gait with appropriate coordination and balance  Neuro: Normal light sensory exam of lower extremity      MSK:  Exam shows a well-nourished 57-year-old male who ambulates full weightbearing without assistive device.  He is accompanied by his father who helps take notes but really does not know patient's medical history or current plan of cares.  Patient has increased varus deformity of his left knee with slight varus thrust.  Previous knee arthroscopy incisions which are fully healed.  No significant effusion.  Pain noted along the medial joint line to palpation with no pain laterally or in the patella patella fossa.  No significant crepitation noted.  Patient still has full knee extension and active flexion to 118 degrees but passive flexion to 124 degrees.  Ligament exam is grossly stable but pseudolaxity is noted of the medial compartment secondary to joint collapse.  Drawer testing is stable anterior and posterior.  Circumduction maneuvers are negative.  Motor strength is within normal limits of the quadriceps and hamstrings.  No significant lower extremity edema.  Patient is grossly neurovascular intact L2 just S1 bilaterally.  Left hip motion is full and pain-free with negative impingement testing.  Straight leg raise is negative for radicular component on the left lower extremity.  No significant lower extremity edema.  No evidence of diabetic neuropathy at this time.        Independent visualization of the below image:  3 view x-rays of the patient's left knee are obtained and reviewed today and reviewed with the patient showing bone-on-bone medial compartment grade 4 osteoarthritis of the left knee with increased varus alignment.  Slight translation noted of the femur medially in relation to the tibia.  Slight  degenerative changes are noted of the patellofemoral joint more so over the lateral facet.  No evidence of fracture or dislocation.    Patient's conditions were thoroughly discussed during today's visit with total time spent face-to-face with the patient and documentation being 70 minutes.    Primo Mathias PA-C  [facility]

## 2023-09-08 ENCOUNTER — PATIENT OUTREACH (OUTPATIENT)
Dept: CARE COORDINATION | Facility: CLINIC | Age: 58
End: 2023-09-08
Payer: COMMERCIAL

## 2023-09-08 NOTE — PROGRESS NOTES
Clinic Care Coordination Contact  [facility]/Voicemail       Clinical Data: Care Coordinator Outreach  Outreach attempted x 1.  Called pt's sister, Lynsey. CTC signed 5/22/23. Mailbox is full; unable to leave VM   Plan: Care Coordinator will try to reach patient again in 10 business days.    Shelly Bray  Community Health Worker  [facility]  [phone]

## 2023-09-14 ENCOUNTER — ANCILLARY PROCEDURE (OUTPATIENT)
Dept: GENERAL RADIOLOGY | Facility: CLINIC | Age: 58
End: 2023-09-14
Attending: PHYSICIAN ASSISTANT
Payer: COMMERCIAL

## 2023-09-14 ENCOUNTER — OFFICE VISIT (OUTPATIENT)
Dept: ORTHOPEDICS | Facility: CLINIC | Age: 58
End: 2023-09-14
Attending: FAMILY MEDICINE
Payer: COMMERCIAL

## 2023-09-14 VITALS
HEIGHT: 66 IN | BODY MASS INDEX: 40.82 KG/M2 | WEIGHT: 254 LBS | DIASTOLIC BLOOD PRESSURE: 75 MMHG | SYSTOLIC BLOOD PRESSURE: 125 MMHG

## 2023-09-14 DIAGNOSIS — Z79.4 TYPE 2 DIABETES MELLITUS WITH HYPERGLYCEMIA, WITH LONG-TERM CURRENT USE OF INSULIN (H): ICD-10-CM

## 2023-09-14 DIAGNOSIS — E89.0 S/P COMPLETE THYROIDECTOMY: ICD-10-CM

## 2023-09-14 DIAGNOSIS — Z98.890 HX OF ARTHROSCOPY OF LEFT KNEE: ICD-10-CM

## 2023-09-14 DIAGNOSIS — M25.551 HIP PAIN, RIGHT: ICD-10-CM

## 2023-09-14 DIAGNOSIS — M17.12 PRIMARY OSTEOARTHRITIS OF LEFT KNEE: ICD-10-CM

## 2023-09-14 DIAGNOSIS — Q98.4 KLINEFELTER'S SYNDROME: ICD-10-CM

## 2023-09-14 DIAGNOSIS — E11.65 TYPE 2 DIABETES MELLITUS WITH HYPERGLYCEMIA, WITH LONG-TERM CURRENT USE OF INSULIN (H): ICD-10-CM

## 2023-09-14 DIAGNOSIS — E66.01 MORBID OBESITY (H): ICD-10-CM

## 2023-09-14 DIAGNOSIS — M17.12 PRIMARY OSTEOARTHRITIS OF LEFT KNEE: Primary | ICD-10-CM

## 2023-09-14 PROCEDURE — 73562 X-RAY EXAM OF KNEE 3: CPT | Mod: TC | Performed by: RADIOLOGY

## 2023-09-14 PROCEDURE — 99205 OFFICE O/P NEW HI 60 MIN: CPT | Performed by: PHYSICIAN ASSISTANT

## 2023-09-14 NOTE — LETTER
9/14/2023         RE: Johan Cole  [address]  [place] [address]        Dear Colleague,    Thank you for referring your patient, Johan Cole, to the [facility] [place]. Please see a copy of my visit note below.    ASSESSMENT & PLAN       Today we discussed the underlying etiology/pathology of patient's   1. Primary osteoarthritis of left knee    2. Morbid obesity (H)    3. Type 2 diabetes mellitus with hyperglycemia, with long-term current use of insulin (H)    4. Klinefelter's syndrome    5. S/P complete thyroidectomy    6. Hip pain, right    7. Hx of arthroscopy of left knee      -We had a very long discussion today with the patient and patient's father the patient has bone-on-bone medial compartment osteoarthritis with varus deformity.  Mild degenerative changes noted of the patellofemoral articulation.  - Patient has numerous medical comorbidities including uncontrolled diabetes with hemoglobin A1c recently of 10.8.  Patient has restarted taking his metformin as directed but is not using insulin and needs to have close monitoring of this condition and follow-up with his primary care team Dr. Alvarez.  - We discussed that patient needs to be medically optimized before surgical consideration usually with hemoglobin A1c under 7.5 as well as stabilization of all of his other medical conditions  - Patient is not interested in further injection therapy including cortisone today and we did discuss that this would delay any surgical intervention for a minimum of 3 to 4 months due to increased risk of surgical infection  - Patient previously was prescribed Voltaren 1 tablet twice daily and this was refilled today as original prescription would have ran out in August 2023 if taking it appropriately.  Patient does not know if he has this prescription bottle still at home or any volume of it left  - Patient is not interested in continuing care for his left knee at [facility] as he believes Dr. Doan who might have retired who was originally to do his left knee replacement and this is why surgery wasn't completed.  Orthopedic  referral made for the patient to establish with one of our total joint arthroplasty surgeons for knee partial versus full replacement.  Patient understood MRI of the left knee may be warranted to evaluate the lateral and patellofemoral compartments.  - Patient was to sign a release of information/medical records from [facility] in regards to his left knee prior to being seen for his surgical consultation for his left knee  - Patient may work without restrictions as he has a chronic longstanding problem in regards to his left knee  - In regards to his chronic back and right hip issues he should follow-up with Dr. Tenorio at [facility] for his spine as recommended previously and should at least make a phone call to [facility] where he had his hip replaced.    -Call direct clinic number [[phone]] at any time with questions or concerns in regards to your recent office visit with me.     Primo Mathias PA-C  [facility]        SUBJECTIVE  Johan LIVE Douglas is a/an 57 year old male who is seen in consultation at the request of  Ap Alvarez M.D. for evaluation of right hip pain and worse left knee pain. The patient is seen with their father.  Patient has a very extensive orthopedic history which 20 minutes of time was spent reviewing his medical records before visiting personally with the patient.  Patient has a history of right total hip arthroplasty done at [facility] in 2013 with lack of follow-up and no further management in regards to his hip.  Patient has established with [facility] in regards to his left knee and had been seeing Dr. Doan at [facility] with proposed left total knee arthroplasty and patient does not recall why this was not completed as proposed date was August 22,  2023.  Patient does have a history of left knee arthroscopy years ago.  Patient also has established with Dr. Aldridge at [facility] for chronic back issues as well as possible lumbar radiculopathy undergoing L5 spinal injection with diagnostic improvement but no long-term relief.'s recommendation was to see the patient back for repeat assessment and possible targeting the L4 nerve root after completion of total knee arthroplasty.  Patient has again not reestablished with any other orthopedic provider in regards to continued management of his conditions.   Patient also is a uncontrolled diabetic with previous visits to the emergency department for glucose levels at 1400 requiring insulin drip.  Patient states that he is not checking his blood sugars on a regular basis but is taking his oral metformin but is not using insulin due to cost.  Today patient would like to focus solely on his left knee as his left knee has become more painful over the last 10 to 14 days without injury or trauma.  He currently is not taking any medication in regards to management of his discomfort as he states his primary care provider would not prescribe medicine due to the risk associated with his uncontrolled diabetes.  Patient states that it took him approximately 15 minutes to get from his car to the front lobby today due to his left knee discomfort.  He is accompanied by his father today.  Recent hemoglobin A1c 3 weeks ago was 10.8.  Patient has a previous prescription of Voltaren 50 mg tablets to be taken 1 tablet twice daily original prescription from Dr. Alvarez with 1 refill done in June 2023.  Patient unsure if he still has it or is taking it.  Patient does have a bit of a learning disability related to his Klinefelter syndrome and usually his sister is involved in his care or his father who is present today helping take notes.        Onset: 6 month(s) ago. Reports insidious onset without acute precipitating  event.  Location of Pain: left knee - anterior over the patella and medial and lateral to the patella with radiation to the lateral hp  Rating of Pain at worst: 10/10  Rating of Pain Currently: 7/10  Worsened by: walking, avoids stairs, difficulty working  Better with: none  Treatments tried: no treatment tried to date - has continued to work  Quality: sharp, stabbing   Associated symptoms: swelling, warmth, weakness of both legs - has to lift right leg to get into vehicles, and feeling of instability of the left knee  Orthopedic history: YES - Date: Right total of arthroplasty 2013, [place].  Scheduled left total knee arthroplasty for 08/22/2023 with loss of orthopedic follow-up for unknown reasons with Dr. Doan at [facility].  Lumbar spine management with steroid injections managed by Dr. Aldridge [facility].  History of left knee arthroscopy [facility] unknown date    Relevant surgical history: YES - Date: 04/2013 Right [place]  Social history: social history: works at [employer]. Unable to play golf or fish for the last 2 years due to pain    No past medical history on file.  Social History     Socioeconomic History     Marital status: Single   Tobacco Use     Smoking status: Never     Smokeless tobacco: Current     Types: Chew   Vaping Use     Vaping Use: Never used   Substance and Sexual Activity     Alcohol use: Yes     Comment: Rare     Drug use: Never     Sexual activity: Not Currently         Patient's past medical, surgical, social, and family histories were personally reviewed today and no changes are noted.    REVIEW OF SYSTEMS:  10 point ROS is negative other than symptoms noted above in HPI, Past Medical History or as stated below  Constitutional: NEGATIVE for fever, chills, change in weight  Skin: NEGATIVE for worrisome rashes, moles or lesions  GI/: NEGATIVE for bowel or bladder changes  Neuro: NEGATIVE for weakness, dizziness or paresthesias    OBJECTIVE:  There were  no vitals taken for this visit.   General: healthy, alert and in no distress  HEENT: no scleral icterus or conjunctival erythema  Skin: no suspicious lesions or rash. No jaundice.  CV: no pedal edema  Resp: normal respiratory effort without conversational dyspnea   Psych: normal mood and affect.  Patient states he does have poor memory and needs a family member with to help with taking notes as well as maintaining medical direction/plan of care  Gait: normal steady gait with appropriate coordination and balance  Neuro: Normal light sensory exam of lower extremity      MSK:  Exam shows a well-nourished 57-year-old male who ambulates full weightbearing without assistive device.  He is accompanied by his father who helps take notes but really does not know patient's medical history or current plan of cares.  Patient has increased varus deformity of his left knee with slight varus thrust.  Previous knee arthroscopy incisions which are fully healed.  No significant effusion.  Pain noted along the medial joint line to palpation with no pain laterally or in the patella patella fossa.  No significant crepitation noted.  Patient still has full knee extension and active flexion to 118 degrees but passive flexion to 124 degrees.  Ligament exam is grossly stable but pseudolaxity is noted of the medial compartment secondary to joint collapse.  Drawer testing is stable anterior and posterior.  Circumduction maneuvers are negative.  Motor strength is within normal limits of the quadriceps and hamstrings.  No significant lower extremity edema.  Patient is grossly neurovascular intact L2 just S1 bilaterally.  Left hip motion is full and pain-free with negative impingement testing.  Straight leg raise is negative for radicular component on the left lower extremity.  No significant lower extremity edema.  No evidence of diabetic neuropathy at this time.        Independent visualization of the below image:  3 view x-rays of the  patient's left knee are obtained and reviewed today and reviewed with the patient showing bone-on-bone medial compartment grade 4 osteoarthritis of the left knee with increased varus alignment.  Slight translation noted of the femur medially in relation to the tibia.  Slight degenerative changes are noted of the patellofemoral joint more so over the lateral facet.  No evidence of fracture or dislocation.    Patient's conditions were thoroughly discussed during today's visit with total time spent face-to-face with the patient and documentation being 70 minutes.    Primo Mathias PA-C  [facility]      Again, thank you for allowing me to participate in the care of your patient.        Sincerely,        Primo Mathias PA-C

## 2023-09-14 NOTE — PATIENT INSTRUCTIONS
Today we discussed the underlying etiology/pathology of patient's   1. Primary osteoarthritis of left knee    2. Morbid obesity (H)    3. Type 2 diabetes mellitus with hyperglycemia, with long-term current use of insulin (H)    4. Klinefelter's syndrome    5. S/P complete thyroidectomy    6. Hip pain, right    7. Hx of arthroscopy of left knee      -We had a very long discussion today with the patient and patient's father the patient has bone-on-bone medial compartment osteoarthritis with varus deformity.  Mild degenerative changes noted of the patellofemoral articulation.  - Patient has numerous medical comorbidities including uncontrolled diabetes with hemoglobin A1c recently of 10.8.  Patient has restarted taking his metformin as directed but is not using insulin and needs to have close monitoring of this condition and follow-up with his primary care team Dr. Alvarez.  - We discussed that patient needs to be medically optimized before surgical consideration usually with hemoglobin A1c under 7.5 as well as stabilization of all of his other medical conditions  - Patient is not interested in further injection therapy including cortisone today and we did discuss that this would delay any surgical intervention for a minimum of 3 to 4 months due to increased risk of surgical infection  - Patient previously was prescribed Voltaren 1 tablet twice daily and this was refilled today as original prescription would have ran out in August 2023 if taking it appropriately.  Patient does not know if he has this prescription bottle still at home or any volume of it left  - Patient is not interested in continuing care for his left knee at [facility] as he believes Dr. Doan who might have retired who was originally to do his left knee replacement.  Orthopedic  referral made for the patient to establish with one of our total joint arthroplasty surgeons for knee partial versus full replacement.  - Patient was to  sign a release of information/medical records from [facility] in regards to his left knee prior to being seen for his surgical consultation for his left knee  - Patient may work without restrictions as he has a chronic longstanding problem in regards to his left knee  - In regards to his chronic back and right hip issues he should follow-up with Dr. Tenorio at [facility] for his spine as recommended previously and should at least make a phone call to [facility] where he had his hip replaced.    -Call direct clinic number [[phone]] at any time with questions or concerns in regards to your recent office visit with me.     Primo Mathias PA-C  [facility]

## 2023-09-26 ENCOUNTER — PATIENT OUTREACH (OUTPATIENT)
Dept: CARE COORDINATION | Facility: CLINIC | Age: 58
End: 2023-09-26
Payer: COMMERCIAL

## 2023-09-26 NOTE — PROGRESS NOTES
Clinic Care Coordination Contact  Community Health Worker Follow Up    Care Gaps: Discussed that pt is due for his preventative care visit.     Health Maintenance Due   Topic Date Due    YEARLY PREVENTIVE VISIT  Never done    DIABETIC FOOT EXAM  Never done    ADVANCE CARE PLANNING  Never done    EYE EXAM  Never done    Pneumococcal Vaccine: Pediatrics (0 to 5 Years) and At-Risk Patients (6 to 64 Years) (1 - PCV) Never done    COLORECTAL CANCER SCREENING  Never done    HEPATITIS B IMMUNIZATION (3 of 3 - 19+ 3-dose series) 09/21/2016    ZOSTER IMMUNIZATION (1 of 2) Never done    COVID-19 Vaccine (4 - Moderna series) 09/29/2022    INFLUENZA VACCINE (1) 09/01/2023       Care Plan:   Care Plan: Health Maintenance       Problem: Health Maintenance Due or Overdue       Goal: Become up-to-date with health maintenance visit(s)       Start Date: 9/26/2023 Expected End Date: 12/29/2023    Priority: High    Note:     Barriers: Pt has multiple comorbidities  Strengths: Sister, Lynsey, very involved in pt's care, enrolled in CC  Patient expressed understanding of goal: Lynsey expresses understanding  Action steps to achieve this goal:  1. My sister, Lynsey, will schedule OV with Dr. Alvarez to address DM concerns  2. My sister, Lynsey, and I will address overdue health maintenance with Dr. Alvarez  3. My sister, Lynsey, and I will schedule preventative care visit with Dr. Alvarez  4. My sister, Lynesy, and I will schedule eye exam   5. I will stay in touch with CHWShelly, with questions or concerns                                   Intervention and Education during outreach:   Spoke with pt's sister, Lynsey, for 10 min while she was with her  class!! CTC signed 5/22/23.   Lynsey states pt has made good progress. M[place]hoices Assessment has been completed and pt has been approved for a DD waiver. He has been assigned a CM.   The [facility] has found housing for pt, with move-in date 10/15/23.  Pt is having left  knee pain. Lynsey was not able to attend pt's 9/14/23 appointment at [facility] so wonders what the f/u plans are. Lynsey is wondering if CHW has access to those visit notes. CHW was able to read Dr. Mathias's instructions in his note from 9/14/23 word-for-word with Lynsey, specifically noting recent A1C of 10.8 which would need to be optimized before surgical consideration usually with hemoglobin A1c under 7.5 as well as stabilization of all of his other medical conditions. Noted new prescription written for Voltaren to be taken 1 tablet (50 mg) twice daily as needed for moderate pain (per medications [employer]). Lynsey is also aware that orthopedic  referral placed 9/14/23 to establish with one of [facility] total joint arthroplasty surgeons for knee partial vs full replacement. Gave Lynsey phone number of orthopedic  ([phone])  Lynsey also given the phone number of the [facility] to schedule visit with Dr. Alvarez to address DM concerns ([phone]).  Lynsey is aware she can open pt's [employer] account to review full report of Dr. Mathias's appointment summary on 9/14/23.    CHW asks if Lynsey has any further concerns or need for resources as this time. Lynsey  states she does not. CHW made sure Lynsey has CHW contact information. Lynsey will contact CHW with questions or updates before next outreach.     CHW Plan: CHW will follow up with pt in one month.    Shelly Bray  Community Health Worker  [facility]  [phone]

## 2023-10-02 ENCOUNTER — PATIENT OUTREACH (OUTPATIENT)
Dept: CARE COORDINATION | Facility: CLINIC | Age: 58
End: 2023-10-02
Payer: COMMERCIAL

## 2023-10-02 ASSESSMENT — ACTIVITIES OF DAILY LIVING (ADL): DEPENDENT_IADLS:: INDEPENDENT

## 2023-10-02 NOTE — PROGRESS NOTES
Clinic Care Coordination Contact  Care Coordination Clinician Chart Review    Situation: Patient chart reviewed by Care Coordinator.       Background: Care Coordination Program started: 5/22/2023. Initial assessment completed and patient-centered care plan(s) were developed with participation from patient. Lead CC handed patient off to CHW for continued outreaches.       Assessment: Per chart review, patient outreach completed by CC CHW on 9/26/2023.  Patient is actively working to accomplish goal(s). Patient's goal(s) appropriate and relevant at this time. Patient is not due for updated Plan of Care.  Assessments will be completed annually or as needed/with change of patient status.      Care Plan: Health Maintenance       Problem: Health Maintenance Due or Overdue       Goal: Become up-to-date with health maintenance visit(s)       Start Date: 9/26/2023 Expected End Date: 12/29/2023    Priority: High    Note:     Barriers: Pt has multiple comorbidities  Strengths: SisterLynsey, very involved in pt's care, enrolled in CC  Patient expressed understanding of goal: Lynsey expresses understanding  Action steps to achieve this goal:  1. My sister, Lynsey, will schedule OV with Dr. Alvarez to address DM concerns  2. My sister, Lynsey, and I will address overdue health maintenance with Dr. Alvarez  3. My sister, Lynsey, and I will schedule preventative care visit with Dr. Alvarez  4. My sister, Lynsey, and I will schedule eye exam   5. I will stay in touch with CHW, Shelly, with questions or concerns                                      Plan/Recommendations: The patient will continue working with Care Coordination to achieve goal(s) as above. CHW will continue outreaches at minimum every 30 days and will involve Lead CC as needed or if patient is ready to move to Maintenance. Lead CC will continue to monitor CHW outreaches and patient's progress to goal(s) every 6 weeks.     Plan of Care updated and sent to patient: Cheryl Keenan  ILIANA  Clinic Care Coordinator  [facility]  [phone]  [email or web address]

## 2023-10-19 ENCOUNTER — OFFICE VISIT (OUTPATIENT)
Dept: FAMILY MEDICINE | Facility: CLINIC | Age: 58
End: 2023-10-19
Payer: COMMERCIAL

## 2023-10-19 VITALS
BODY MASS INDEX: 38.27 KG/M2 | HEART RATE: 78 BPM | TEMPERATURE: 98 F | SYSTOLIC BLOOD PRESSURE: 127 MMHG | OXYGEN SATURATION: 98 % | RESPIRATION RATE: 24 BRPM | DIASTOLIC BLOOD PRESSURE: 88 MMHG | HEIGHT: 66 IN | WEIGHT: 238.1 LBS

## 2023-10-19 DIAGNOSIS — Z79.4 TYPE 2 DIABETES MELLITUS WITH HYPERGLYCEMIA, WITH LONG-TERM CURRENT USE OF INSULIN (H): Primary | ICD-10-CM

## 2023-10-19 DIAGNOSIS — E11.65 TYPE 2 DIABETES MELLITUS WITH HYPERGLYCEMIA, WITH LONG-TERM CURRENT USE OF INSULIN (H): Primary | ICD-10-CM

## 2023-10-19 DIAGNOSIS — Z12.11 SCREEN FOR COLON CANCER: ICD-10-CM

## 2023-10-19 LAB
HBA1C MFR BLD: 9.5 % (ref 0–5.6)
HOLD SPECIMEN: NORMAL

## 2023-10-19 PROCEDURE — 99214 OFFICE O/P EST MOD 30 MIN: CPT | Performed by: NURSE PRACTITIONER

## 2023-10-19 PROCEDURE — 36415 COLL VENOUS BLD VENIPUNCTURE: CPT | Performed by: NURSE PRACTITIONER

## 2023-10-19 PROCEDURE — 83036 HEMOGLOBIN GLYCOSYLATED A1C: CPT | Performed by: NURSE PRACTITIONER

## 2023-10-19 RX ORDER — LANCETS
EACH MISCELLANEOUS
Qty: 100 EACH | Refills: 6 | Status: SHIPPED | OUTPATIENT
Start: 2023-10-19

## 2023-10-19 ASSESSMENT — PAIN SCALES - GENERAL: PAINLEVEL: MILD PAIN (3)

## 2023-10-19 NOTE — PROGRESS NOTES
Assessment & Plan     ICD-10-CM    1. Type 2 diabetes mellitus with hyperglycemia, with long-term current use of insulin (H)  E11.65 Hemoglobin A1c    Z79.4 blood glucose monitoring (NO BRAND SPECIFIED) meter device kit     blood glucose (NO BRAND SPECIFIED) test strip     thin (NO BRAND SPECIFIED) lancets     Hemoglobin A1c      2. Screen for colon cancer  Z12.11           Patient has not had control of diabetes for sometime now.  Has history of hospitalization for hyperosmolar non-ketotic state one year ago.  Continues to have concerns about med costs.  Is meeting with care coordination today.  Now has insurance also.  Needs home glucose monitoring supplies. Sister is here with pt and actively helping to coordinate care.    Check labs, order testing supplies for diabetes and Follow-up as indicated    Late entry: A1c improved, but still  significantly elevated.  Will have phone Follow up  with pt and sister to arrange MTM/CDE consult      There are no Patient Instructions on file for this visit.  No follow-ups on file.      HARMAN De Los Santos CNP  [facility] [place]  =================    Subjective   Johan is a 57 year old, presenting for the following health issues:  Diabetes and Hypertension      History of Present Illness       Diabetes:   He presents for follow up of diabetes.    He is not checking blood glucose.         He has no concerns regarding his diabetes at this time.   He is not experiencing numbness or burning in feet, excessive thirst, blurry vision, weight changes or redness, sores or blisters on feet. The patient has not had a diabetic eye exam in the last 12 months.          Hypertension: He presents for follow up of hypertension.  He does not check blood pressure  regularly outside of the clinic. Outpatient blood pressures have not been over 140/90. He follows a low salt diet.     He eats 0-1 servings of fruits and vegetables daily.He consumes 1 sweetened beverage(s) daily.He  "exercises with enough effort to increase his heart rate 9 or less minutes per day.  He exercises with enough effort to increase his heart rate 3 or less days per week.   He is taking medications regularly.             Review of Systems   Constitutional:  Negative for unexpected weight change.   Respiratory: Negative.     Cardiovascular: Negative.    Gastrointestinal: Negative.             Objective    /88 (BP Location: Right arm, Patient Position: Sitting, Cuff Size: Adult Large)   Pulse 78   Temp 98  F (36.7  C) (Oral)   Resp 24   Ht 1.676 m (5' 5.98\")   Wt 108 kg (238 lb 1.6 oz)   SpO2 98%   BMI 38.45 kg/m    Body mass index is 38.45 kg/m .  Physical Exam  Constitutional:       Appearance: Normal appearance.   Cardiovascular:      Rate and Rhythm: Normal rate and regular rhythm.   Pulmonary:      Effort: Pulmonary effort is normal.      Breath sounds: Normal breath sounds.   Neurological:      General: No focal deficit present.      Mental Status: He is alert. Mental status is at baseline.   Psychiatric:         Mood and Affect: Mood normal.         Thought Content: Thought content normal.        Visit time 30 min              "

## 2023-10-20 ASSESSMENT — ENCOUNTER SYMPTOMS
UNEXPECTED WEIGHT CHANGE: 0
RESPIRATORY NEGATIVE: 1
GASTROINTESTINAL NEGATIVE: 1
CARDIOVASCULAR NEGATIVE: 1

## 2023-10-31 DIAGNOSIS — I10 BENIGN ESSENTIAL HYPERTENSION: ICD-10-CM

## 2023-10-31 DIAGNOSIS — E89.0 S/P COMPLETE THYROIDECTOMY: ICD-10-CM

## 2023-10-31 RX ORDER — AMLODIPINE BESYLATE 5 MG/1
5 TABLET ORAL AT BEDTIME
Qty: 90 TABLET | Refills: 0 | Status: SHIPPED | OUTPATIENT
Start: 2023-10-31 | End: 2024-02-09

## 2023-10-31 RX ORDER — LEVOTHYROXINE SODIUM 100 UG/1
100 TABLET ORAL DAILY
Qty: 90 TABLET | Refills: 1 | Status: SHIPPED | OUTPATIENT
Start: 2023-10-31 | End: 2024-03-07

## 2023-11-02 ENCOUNTER — TELEPHONE (OUTPATIENT)
Dept: CARE COORDINATION | Facility: CLINIC | Age: 58
End: 2023-11-02
Payer: COMMERCIAL

## 2023-11-02 DIAGNOSIS — Z79.4 TYPE 2 DIABETES MELLITUS WITH HYPERGLYCEMIA, WITH LONG-TERM CURRENT USE OF INSULIN (H): Primary | ICD-10-CM

## 2023-11-02 DIAGNOSIS — E11.65 TYPE 2 DIABETES MELLITUS WITH HYPERGLYCEMIA, WITH LONG-TERM CURRENT USE OF INSULIN (H): Primary | ICD-10-CM

## 2023-11-02 NOTE — TELEPHONE ENCOUNTER
Joaquin Reilly and the [facility] Triage Team,    I am going to call out to pt's sister, Lynsey, today for care coordination follow-up visit.     I see that in Melba's 10/19/23 OV note, that she recommends MTM/DE consult for pt and Lynsey. I do not see that any orders have been placed for these two services.     Could you please place these referrals if you still feel MTM/DE visits are still needed?    Thanks so much!    Shelly Bray  Community Health Worker  [facility]  [phone]

## 2023-11-02 NOTE — TELEPHONE ENCOUNTER
Epic not letting me regis up.  Already has been routed to Meredith Victor.    Homa Gomez RN, BSN  [facility]

## 2023-11-03 ENCOUNTER — PATIENT OUTREACH (OUTPATIENT)
Dept: CARE COORDINATION | Facility: CLINIC | Age: 58
End: 2023-11-03
Payer: COMMERCIAL

## 2023-11-03 NOTE — PROGRESS NOTES
Clinic Care Coordination Contact  Community Health Worker Follow Up    Care Gaps: Discussed need for eye appointment. Lynsey cannot remember the last time he had a eye exam; will follow up on this.    Health Maintenance Due   Topic Date Due    YEARLY PREVENTIVE VISIT  Never done    DIABETIC FOOT EXAM  Never done    ADVANCE CARE PLANNING  Never done    EYE EXAM  Never done    Pneumococcal Vaccine: Pediatrics (0 to 5 Years) and At-Risk Patients (6 to 64 Years) (1 - PCV) Never done    COLORECTAL CANCER SCREENING  Never done    HEPATITIS B IMMUNIZATION (3 of 3 - 19+ 3-dose series) 09/21/2016    ZOSTER IMMUNIZATION (1 of 2) Never done    INFLUENZA VACCINE (1) 09/01/2023    COVID-19 Vaccine (4 - 2023-24 season) 09/01/2023       Care Gap Goal set: Yes    Care Plan:   Care Plan: Health Maintenance       Problem: Health Maintenance Due or Overdue       Goal: Become up-to-date with health maintenance visit(s)       Start Date: 9/26/2023 Expected End Date: 12/29/2023    This Visit's Progress: 50%    Priority: High    Note:     Barriers: Pt has multiple comorbidities  Strengths: SisterLynsey, very involved in pt's care, enrolled in CC  Patient expressed understanding of goal: Lynsey expresses understanding  Action steps to achieve this goal:  1. My sister, Lynsey, will schedule OV with Dr. Alvarez to address DM concerns (completed)  2. My sister, Lynsey, and I will address overdue health maintenance with Dr. Alvarez (discussed)  3. My sister, Lynsey, and I will schedule preventative care visit with Dr. Alvarez (completed)  4. My sister, Lynsey, and I will schedule eye exam   5. I will stay in touch with CHWShelly, with questions or concerns                                   Intervention and Education during outreach:   Spoke with pt's sister, Lynsey. CTC signed 5/22/23.  Housing - pt moved into new apartment which he is loving.  DD waiver - pt is now certified disabled. Have met with [facility] CM to discuss services available to pt. Lynsey states these  are the following services being considered:  Dose [employer] - Dose health medication dispenser, medical alert pendant, MOW, services to assist with budgeting, and grocery shopping.  Pt has [place] as a secondary insurance now; will be getting a card soon.  Transportation - pt drives.  At 10/19/23 OV with Melba Victor CNP, she mentions an appointment with MTM/DE. CHW did not see this ordered, so sent telephone message to the [place] Triage Team and Melba Victor CNP,  yesterday. Have not hear back regarding this. Lynsey requests the MTM and DE contact her to assist in scheduling the appointment, as she wished to attend visits with him.    CHW asks if Lynsey has any further concerns or need for resources as this time. Lynsey states she does not. CHW made sure Lynsey pt has CHW contact information. Lynsey will contact CHW with questions or updates before next outreach.     CHW Plan: CHW will route another message to [place] Triage Team and Melba Victor CNP, on Monday, 11/6/23, regarding need for MTM and DE orders. CHW will follow up with Lynsey  in one month.    Shelly Bray  Community Health Worker  [facility]  [phone]

## 2023-11-03 NOTE — PROGRESS NOTES
Clinic Care Coordination Contact  Care Team Conversations    Received IB message from Melba Victor CNP, stating she placed an MTM referral 4:21 pm today.    CHW left VM at MTM scheduling, [phone], requesting they contact pt's sister, Lynsey, when scheduling this appointment, because she would like to be there for MTM appointment too.    CHW Plan: CHW contacted Lynsey, pt's sister, with message that MTM order had been placed and MTM will be contacting her to schedule pt's MTM appointment at the [facility].    Shelly Bray  Community Health Worker  [facility]  [phone]

## 2023-11-09 ENCOUNTER — TELEPHONE (OUTPATIENT)
Dept: SLEEP MEDICINE | Facility: CLINIC | Age: 58
End: 2023-11-09
Payer: COMMERCIAL

## 2023-11-09 NOTE — TELEPHONE ENCOUNTER
COULD NOT LVM FOR PT, VM NOT SET UP AND UNABLE TO LEAVE MESSAGE. PROVIDER IS OUT OF CLINIC 12/27 CALLED TO RESCHEDULE    Simon Gerard, Visit facilitator.

## 2023-11-13 ENCOUNTER — PATIENT OUTREACH (OUTPATIENT)
Dept: CARE COORDINATION | Facility: CLINIC | Age: 58
End: 2023-11-13
Payer: COMMERCIAL

## 2023-11-13 ASSESSMENT — ACTIVITIES OF DAILY LIVING (ADL): DEPENDENT_IADLS:: INDEPENDENT

## 2023-11-13 NOTE — PROGRESS NOTES
Clinic Care Coordination Contact  Care Coordination Clinician Chart Review    Situation: Patient chart reviewed by Care Coordinator.       Background: Care Coordination Program started: 5/22/2023. Initial assessment completed and patient-centered care plan(s) were developed with participation from patient. Lead CC handed patient off to CHW for continued outreaches.       Assessment: Per chart review, patient outreach completed by CC CHW on 11/3/2023.  Patient is actively working to accomplish goal(s). Patient's goal(s) appropriate and relevant at this time. Patient is due for updated Plan of Care.  Assessments will be completed annually or as needed/with change of patient status.      Care Plan: Health Maintenance       Problem: Health Maintenance Due or Overdue       Goal: Become up-to-date with health maintenance visit(s)       Start Date: 9/26/2023 Expected End Date: 12/29/2023    This Visit's Progress: 50%    Priority: High    Note:     Barriers: Pt has multiple comorbidities  Strengths: Lynsey Jacinto, very involved in pt's care, enrolled in CC  Patient expressed understanding of goal: Lynsey expresses understanding  Action steps to achieve this goal:  1. My sister, Lynsey, will schedule OV with Dr. Alvarez to address DM concerns (completed)  2. My sister, Lynsey, and I will address overdue health maintenance with Dr. Alvarez (discussed)  3. My sister, Lynsey, and I will schedule preventative care visit with Dr. Alvarez (completed)  4. My sister, Lynsey, and I will schedule eye exam   5. I will stay in touch with CHW, Shelly, with questions or concerns                                      Plan/Recommendations: The patient will continue working with Care Coordination to achieve goal(s) as above. CHW will continue outreaches at minimum every 30 days and will involve Lead CC as needed or if patient is ready to move to Maintenance. Lead CC will continue to monitor CHW outreaches and patient's progress to goal(s) every 6 weeks.      Plan of Care updated and sent to patient: Yes, via mail    Devan Keenan [facility]  Clinic Care Coordinator  [facility]  [phone]  [email or web address]

## 2023-11-13 NOTE — LETTER
[facility] CARE COORDINATION  [id number]  [facility] [address]    November 13, 2023        Johan Cole  [address]  [place] [address]          Dear Patricia Prado is an updated Patient Centered Plan of Care for your continued enrollment in Care Coordination. Please let us know if you have additional questions, concerns, or goals that we can assist with.    Sincerely,    Devan Keenan [facility]  Clinic Care Coordinator  [facility]  [phone]  [email or web address]  Patient Centered Plan of Care  About Me:        Patient Name:  Johan Cole    YOB: 1965  Age:         58 year old   [place] MRN:    [id number] Telephone Information:  Home Phone [phone]   Mobile [phone]   Mobile [phone]       Address:  [address]  [place] [address] Email address:  [email or web address]      Emergency Contact(s)    Name Relationship Lgl Grd Work Phone Home Phone Mobile Phone   1. THEO COLE Father   [phone]    2. CARLITO COLE Sister    [phone]           Primary language:  English     needed? No   [place] [employer]:  [phone] op. 1  Other communication barriers:None    Preferred Method of Communication:     Current living arrangement: I live in a private home with family    Mobility Status/ Medical Equipment: Independent        Health Maintenance  Health Maintenance Reviewed: Due/Overdue   Health Maintenance Due   Topic Date Due    YEARLY PREVENTIVE VISIT  Never done    DIABETIC FOOT EXAM  Never done    ADVANCE CARE PLANNING  Never done    EYE EXAM  Never done    Pneumococcal Vaccine: Pediatrics (0 to 5 Years) and At-Risk Patients (6 to 64 Years) (1 - PCV) Never done    COLORECTAL CANCER SCREENING  Never done    HEPATITIS B IMMUNIZATION (3 of 3 - 19+ 3-dose series) 09/21/2016    ZOSTER  IMMUNIZATION (1 of 2) Never done    INFLUENZA VACCINE (1) 09/01/2023    COVID-19 Vaccine (4 - 2023-24 season) 09/01/2023           My Access Plan  Medical Emergency 911   Primary Clinic Line [facility] - [phone]   24 Hour Appointment Line [phone] or  [phone]) (toll-free)   24 Hour Nurse Line [phone] (toll-free)   Preferred Urgent Care No data recorded   Preferred Hospital [facility]  [phone]     Preferred Pharmacy [id number] - [place], [place] - [address] [place]     Behavioral Health Crisis Line The National Suicide Prevention Lifeline at 1-550.774.6731 or Text/Call 708           My Care Team Members  Patient Care Team         Relationship Specialty Notifications Start End    Ap Alvarez MD PCP - General Family Medicine  4/7/22     Phone: [phone] Fax: [phone]         [address] [place] [address]    Ap Alvarez MD Assigned PCP   3/17/22     Phone: [phone] Fax: [phone]         [address] [place] [address]    Devan Keenan LSW Clinic Care Coordinator Primary Care - CC Admissions 6/7/23     Phone: [phone]         Shelly Bray W Community Health Worker  Admissions 7/12/23     Phone: [phone]         Primo Mathias PA-C Assigned Musculoskeletal Provider   9/23/23     Phone: [phone] Fax: [phone]         [address] [place] [address]                My Care Plans  Self Management and Treatment Plan    Care Plan  Care Plan: Health Maintenance       Problem: Health Maintenance Due or Overdue       Goal: Become up-to-date with health maintenance visit(s)       Start Date: 9/26/2023 Expected End Date: 12/29/2023    This Visit's Progress: 50%    Priority: High    Note:     Barriers: Pt has multiple comorbidities  Strengths: SisterLynsey, very involved in pt's care, enrolled in CC  Patient expressed understanding of goal: Lynsey expresses  understanding  Action steps to achieve this goal:  1. My sister, Lynsey, will schedule OV with Dr. Alvarez to address DM concerns (completed)  2. My sister, Lynsey, and I will address overdue health maintenance with Dr. Alvarez (discussed)  3. My sister, Lynsey, and I will schedule preventative care visit with Dr. Alvarez (completed)  4. My sister, Lynsey, and I will schedule eye exam   5. I will stay in touch with CHWShelly, with questions or concerns                                   Advance Care Plans/Directives:   Advanced Care Plan/Directives on file:   No    Discussed with patient/caregiver(s): No data recorded           My Medical and Care Information  Problem List   Patient Active Problem List   Diagnosis    Post-traumatic osteoarthritis of left knee    DONA (obstructive sleep apnea)    S/P complete thyroidectomy    Benign essential hypertension    Morbid obesity (H)    Hyperlipidemia LDL goal <100    Type 2 diabetes mellitus with hyperglycemia, with long-term current use of insulin (H)    Klinefelter's syndrome    Hypogonadism in male    Learning disability    Microalbuminuria    Acquired hypothyroidism      Current Medications and Allergies:   No Known Allergies  Current Outpatient Medications   Medication    amLODIPine (NORVASC) 5 MG tablet    aspirin 81 MG EC tablet    atorvastatin (LIPITOR) 40 MG tablet    blood glucose (NO BRAND SPECIFIED) test strip    blood glucose monitoring (NO BRAND SPECIFIED) meter device kit    chlorthalidone (HYGROTON) 25 MG tablet    diclofenac (VOLTAREN) 50 MG EC tablet    levothyroxine (SYNTHROID/LEVOTHROID) 100 MCG tablet    lisinopril (ZESTRIL) 40 MG tablet    metFORMIN (GLUCOPHAGE XR) 500 MG 24 hr tablet    thin (NO BRAND SPECIFIED) lancets     No current facility-administered medications for this visit.         Care Coordination Start Date: 5/22/2023   Frequency of Care Coordination: monthly, more frequently as needed     Form Last Updated: 11/13/2023

## 2023-11-27 ENCOUNTER — VIRTUAL VISIT (OUTPATIENT)
Dept: PHARMACY | Facility: CLINIC | Age: 58
End: 2023-11-27
Attending: NURSE PRACTITIONER
Payer: COMMERCIAL

## 2023-11-27 DIAGNOSIS — Z79.4 TYPE 2 DIABETES MELLITUS WITH HYPERGLYCEMIA, WITH LONG-TERM CURRENT USE OF INSULIN (H): Primary | ICD-10-CM

## 2023-11-27 DIAGNOSIS — E03.9 ACQUIRED HYPOTHYROIDISM: ICD-10-CM

## 2023-11-27 DIAGNOSIS — E11.65 TYPE 2 DIABETES MELLITUS WITH HYPERGLYCEMIA, WITH LONG-TERM CURRENT USE OF INSULIN (H): Primary | ICD-10-CM

## 2023-11-27 DIAGNOSIS — I10 BENIGN ESSENTIAL HYPERTENSION: ICD-10-CM

## 2023-11-27 DIAGNOSIS — E78.5 HYPERLIPIDEMIA LDL GOAL <100: ICD-10-CM

## 2023-11-27 DIAGNOSIS — M79.661 PAIN OF RIGHT LOWER LEG: ICD-10-CM

## 2023-11-27 PROCEDURE — 99607 MTMS BY PHARM ADDL 15 MIN: CPT | Performed by: PHARMACIST

## 2023-11-27 PROCEDURE — 99605 MTMS BY PHARM NP 15 MIN: CPT | Performed by: PHARMACIST

## 2023-11-27 NOTE — PROGRESS NOTES
Medication Therapy Management (MTM) Encounter    ASSESSMENT:                            Medication Adherence/Access: recommended he bring his pill bottles and pillbox to next visit to ensure it is set up correctly.    Diabetes:   Patient is not meeting A1c goal of < 7%.  Microalbumin is not at goal <30mg/g. Not taking lisinopril but it is prescribed and restarted today.  Patient would benefit from picking up his glucose meter and bringing to appointment next week so I can show him how to use it.  He would also benefit from a diabetes education visit, referral placed today.  He would likely benefit from an additional medication and we discussed Ozempic today however with history of thyroid cancer we will need to determine if this is an appropriate therapy.  Should be avoided in anyone with personal or family history of medullary thyroid cancer so we will need to look into this further and discuss at follow-up visit.  I did recommend that he take aspirin before diclofenac since diclofenac can reduced antiplatelet efficacy.    Hypertension:   Patient is meeting blood pressure goal of < 140/90mmHg. Patient would benefit from restarting lisinopril.  It is unclear if patient was taking lisinopril the last time blood pressure was checked so advised him to watch for any lightheadedness or dizziness and contact the clinic if this occurs.  If he does start having low blood pressure would eliminate one of his other blood pressure medications or decrease the dose.  We will need to repeat BMP in about a month, ordered future.    Hyperlipidemia:   Stable. Patient would benefit from rechecking fasting cholesterol at upcoming visit, ordered future.    Hypothyroidism:   Stable. Last TSH is within normal limits.     Leg Pain:   Patient would benefit from scheduling visit with [place] [facility] as his PCP recently put in a referral.      PLAN:                            Recheck lipids since starting atorvastatin in June. Please be  fasting when you come to my visit next week and we can check this.  Bring pill box and med bottles in to your next visit.  Bring blood sugar meter into next visit.   Schedule a diabetic eye exam  Take aspirin 2 hours before diclofenac so aspirin can have it's effect before you take diclofenac.  Schedule visit at [place] to discuss leg pain further or schedule follow-up with Dr. Alvarez if [place] referral was for knee pain.   We can discuss other medication options for your diabetes at your upcoming visit next week.  Restart lisinopril 40 mg daily. This was not stopped and can help lower blood pressure and help protect your kidneys from damage. Watch for any lightheadedness or dizziness after starting this. Call the clinic if you have any issues. We will check your blood pressure when you are in next week.    Follow-up: Return in 9 days (on 12/6/2023) for MT office visit with Patricia Oliver.    SUBJECTIVE/OBJECTIVE:                          Johan Cole is a 58 year old male called for an initial visit. He was referred to me from Dr. Ap Alvarez. Patient was accompanied by his sister Lynsey.     Reason for visit: diabetes management and mediation cost.    Allergies/ADRs: Reviewed in chart  Past Medical History: Reviewed in chart  Tobacco: He reports that he has never smoked. His smokeless tobacco use includes chew.  Alcohol: not currently using    Medication Adherence/Access: Patient uses pill box(es).  Patient takes medications 2 time(s) per day.   Per patient, misses medication 1 times per week.   Medication barriers: none.   The patient fills medications at [place]: NO, fills medications at [facility].    Diabetes   Metformin ER 1000 mg twice daily  Lisinopril 40 mg once daily  Aspirin 81mg daily  Patient is not experiencing side effects.  Blood sugar monitoring: never  Current diabetes symptoms: none  Diet/Exercise: He eats twice a day and does not snack in between. He does not drink regular pop or juice. He does  have an energy drink once a month. He likes to eat vegetables and pasta. He does not know what foods increase his blood sugars. He has not met with diabetes education but would be willing.      Eye exam in the last 12 months? No    Foot exam: due  Urine Albumin:   Lab Results   Component Value Date    UMALCR 30.00 (H) 05/22/2023      Lab Results   Component Value Date    A1C 9.5 (H) 10/19/2023     Hypertension   Amlodipine 5 mg once daily in the evening  Lisinopril 40 mg once daily - stopped taking in August at primary care provider visit  Chlorthalidone 25 mg daily  Patient reports no current medication side effects  Patient does not self-monitor blood pressure.       BP Readings from Last 3 Encounters:   10/19/23 127/88   09/14/23 125/75   08/25/23 (!) 175/118     Pulse Readings from Last 3 Encounters:   10/19/23 78   08/25/23 75   06/15/23 67     Hyperlipidemia     atorvastatin 40 mg daily  Patient reports no significant myalgias or other side effects.  The 10-year ASCVD risk score (Sushil SHEIKH, et al., 2019) is: 23.9%    Values used to calculate the score:      Age: 58 years      Sex: Male      Is Non- : No      Diabetic: Yes      Tobacco smoker: No      Systolic Blood Pressure: 127 mmHg      Is BP treated: Yes      HDL Cholesterol: 36 mg/dL      Total Cholesterol: 279 mg/dL       Recent Labs   Lab Test 05/22/23  0917 04/07/22  1419   CHOL 279* 295*   HDL 36* 29*   * 199*   TRIG 213* 337*     Hypothyroidism      Levothyroxine 100 mcg daily  Patient is having the following symptoms: none.      TSH   Date Value Ref Range Status   08/24/2023 3.99 0.30 - 4.20 uIU/mL Final   09/15/2022 5.24 (H) 0.40 - 4.00 mU/L Final     Leg Pain:   Diclofenac 50 mg once daily but doesn't seem to help. Has not tried twice daily as prescribed.    Patient reports no side effects. Reports that his right leg has been hurting and has discussed with doctor.  Reports no injury. No swell or redness.    Today's  Vitals: There were no vitals taken for this visit.  ----------------    I spent 45 minutes with this patient today. All changes were made via collaborative practice agreement with Ap Alvarez MD. A copy of the visit note was provided to the patient's provider(s).    A summary of these recommendations was mailed to the patient.    Nathalie MccabeD  Medication Therapy Management Pharmacist    Telemedicine Visit Details  Type of service:  Telephone visit  Start Time: 10:15 AM  End Time: 11:00 AM     Medication Therapy Recommendations  Benign essential hypertension    Current Medication: lisinopril (ZESTRIL) 40 MG tablet   Rationale: Does not understand instructions - Adherence - Adherence   Recommendation: Start Medication   Status: Patient Agreed - Adherence/Education         Pain of right lower leg    Current Medication: diclofenac (VOLTAREN) 50 MG EC tablet   Rationale: Medication interaction - Adverse medication event - Safety   Recommendation: Change Administration Time   Status: Patient Agreed - Adherence/Education

## 2023-11-27 NOTE — PATIENT INSTRUCTIONS
"Recommendations from today's MTM visit:                                                    MTM (medication therapy management) is a service provided by a clinical pharmacist designed to help you get the most of out of your medicines.   Today we reviewed what your medicines are for, how to know if they are working, that your medicines are safe and how to make your medicine regimen as easy as possible.      Recheck lipids since starting atorvastatin in June. Please be fasting when you come to my visit next week and we can check this.  Bring pill box and med bottles in to your next visit.  Bring blood sugar meter into next visit.   Schedule a diabetic eye exam  Take aspirin 2 hours before diclofenac so aspirin can have it's effect before you take diclofenac.  Schedule visit at [place] to discuss leg pain further or schedule follow-up with Dr. Alvarez if [place] referral was for knee pain.   We can discuss other medication options for your diabetes at your upcoming visit next week.    Follow-up: Return in 9 days (on 12/6/2023) for MTM office visit with Patricia Oliver.    It was great speaking with you today.  I value your experience and would be very thankful for your time in providing feedback in our clinic survey. In the next few days, you may receive an email or text message from [employer] with a link to a survey related to your  clinical pharmacist.\"     To schedule another MTM appointment, please call the clinic directly or you may call the MTM scheduling line at [phone] or toll-free at [phone].     My Clinical Pharmacist's contact information:                                                      Please feel free to contact me with any questions or concerns you have.      Patricia Oliver, PharmD  Medication Therapy Management Pharmacist  [facility] - Monday and Wednesday 7:30 - 4:00       Medication List            Accurate as of November 27, 2023  3:58 PM. If you have any questions, ask your nurse or " doctor.                CONTINUE taking these medications        Morning Afternoon Evening Bedtime   amLODIPine 5 MG tablet  Also known as: NORVASC  TAKE 1 TABLET BY MOUTH AT BEDTIME              aspirin 81 MG EC tablet  Take 1 tablet (81 mg) by mouth daily              atorvastatin 40 MG tablet  Also known as: LIPITOR  Take 1 tablet (40 mg) by mouth daily              blood glucose monitoring meter device kit  Also known as: NO BRAND SPECIFIED  Use to test blood sugar 1 times daily or as directed. Preferred blood glucose meter OR supplies to accompany: Blood Glucose Monitor Brands: per insurance.            blood glucose test strip  Also known as: NO BRAND SPECIFIED  Use to test blood sugar 1 times daily or as directed. To accompany: Blood Glucose Monitor Brands: per insurance.            chlorthalidone 25 MG tablet  Also known as: HYGROTON  Take 1 tablet (25 mg) by mouth daily              diclofenac 50 MG EC tablet  Also known as: VOLTAREN  Take 1 tablet (50 mg) by mouth 2 times daily as needed for moderate pain                levothyroxine 100 MCG tablet  Also known as: SYNTHROID/LEVOTHROID  TAKE 1 TABLET BY MOUTH EVERY DAY              lisinopril 40 MG tablet  Also known as: ZESTRIL  Take 1 tablet (40 mg) by mouth daily              metFORMIN 500 MG 24 hr tablet  Also known as: GLUCOPHAGE XR  Take 2 tablets (1,000 mg) by mouth 2 times daily (with meals)                thin lancets  Also known as: NO BRAND SPECIFIED  Use with lanceting device. To accompany: Blood Glucose Monitor Brands: per insurance.

## 2023-11-30 DIAGNOSIS — I10 BENIGN ESSENTIAL HYPERTENSION: ICD-10-CM

## 2023-11-30 RX ORDER — AMLODIPINE BESYLATE 5 MG/1
5 TABLET ORAL AT BEDTIME
Qty: 90 TABLET | Refills: 0 | OUTPATIENT
Start: 2023-11-30

## 2023-12-06 ENCOUNTER — OFFICE VISIT (OUTPATIENT)
Dept: PHARMACY | Facility: CLINIC | Age: 58
End: 2023-12-06
Payer: COMMERCIAL

## 2023-12-06 VITALS
DIASTOLIC BLOOD PRESSURE: 83 MMHG | SYSTOLIC BLOOD PRESSURE: 120 MMHG | BODY MASS INDEX: 38.59 KG/M2 | OXYGEN SATURATION: 97 % | WEIGHT: 239 LBS | HEART RATE: 77 BPM

## 2023-12-06 DIAGNOSIS — E11.65 TYPE 2 DIABETES MELLITUS WITH HYPERGLYCEMIA, WITH LONG-TERM CURRENT USE OF INSULIN (H): Primary | ICD-10-CM

## 2023-12-06 DIAGNOSIS — Z79.4 TYPE 2 DIABETES MELLITUS WITH HYPERGLYCEMIA, WITH LONG-TERM CURRENT USE OF INSULIN (H): Primary | ICD-10-CM

## 2023-12-06 DIAGNOSIS — I10 BENIGN ESSENTIAL HYPERTENSION: ICD-10-CM

## 2023-12-06 PROCEDURE — 99607 MTMS BY PHARM ADDL 15 MIN: CPT | Performed by: PHARMACIST

## 2023-12-06 PROCEDURE — 99605 MTMS BY PHARM NP 15 MIN: CPT | Performed by: PHARMACIST

## 2023-12-06 NOTE — PATIENT INSTRUCTIONS
"Recommendations from today's MTM visit:                                                       Please call [phone] to schedule diabetes education visit.  Take one diclofenac 50 mg tablet in the morning when you get up and if needed at night you can take ONE more.  Take aspirin 2 hours before diclofenac so aspirin can have it's effect before you take diclofenac.  Have a separate bottle in your car with some tablets in it so you can take this 2 hours after you take your morning pills.  Try Theraworx topical magnesium for your leg cramps.  Look into additional prescription assistance through the state so you dont fun out of pill due to not being able to afford them.   a larger pillbox with morning and evening slots just need to get one with bigger boxes so it is easier to fill and take things out.   all your meds from [facility], they are all no charge with the exception of the aspirin which might be $2.11.  When you get home finish filling you pillbox. Be careful because some of them already have some of the pills in them.  If you have multiple bottles of the same pills I recommend putting a red band around them to keep them together.  Start checking your blood sugar in the morning fasting before you eat or drink anything every day.  Your morning fasting blood sugars should be between 80 and 130.  At next visit bring in all your pil bottles, pillbox, glucose meter and supplies.    Follow-up: Return in 4 weeks (on 1/3/2024) for MTM office visit with Patricia Oliver.    It was great speaking with you today.  I value your experience and would be very thankful for your time in providing feedback in our clinic survey. In the next few days, you may receive an email or text message from [employer] with a link to a survey related to your  clinical pharmacist.\"     To schedule another MTM appointment, please call the clinic directly or you may call the MTM scheduling line at [phone] or toll-free at " [phone].     My Clinical Pharmacist's contact information:                                                      Please feel free to contact me with any questions or concerns you have.      Patricia Oliver, PharmD  Medication Therapy Management Pharmacist  [facility] - Monday and Wednesday 7:30 - 4:00  Pager: [phone]       Medication List            Accurate as of December 6, 2023  9:59 AM. If you have any questions, ask your nurse or doctor.                CONTINUE taking these medications        Morning Afternoon Evening Bedtime   amLODIPine 5 MG tablet  Also known as: NORVASC  TAKE 1 TABLET BY MOUTH AT BEDTIME  Notes to patient: For blood pressure           With dinner      aspirin 81 MG EC tablet  Take 1 tablet (81 mg) by mouth daily  Notes to patient: To prevent heart attack or stroke              atorvastatin 40 MG tablet  Also known as: LIPITOR  Take 1 tablet (40 mg) by mouth daily  Notes to patient: For cholesterol              blood glucose monitoring meter device kit  Also known as: NO BRAND SPECIFIED  Use to test blood sugar 1 times daily or as directed. Preferred blood glucose meter OR supplies to accompany: Blood Glucose Monitor Brands: per insurance.            blood glucose test strip  Also known as: NO BRAND SPECIFIED  Use to test blood sugar 1 times daily or as directed. To accompany: Blood Glucose Monitor Brands: per insurance.            chlorthalidone 25 MG tablet  Also known as: HYGROTON  Take 1 tablet (25 mg) by mouth daily  Notes to patient: For blood pressure               diclofenac 50 MG EC tablet  Also known as: VOLTAREN  Take 1 tablet (50 mg) by mouth 2 times daily as needed for moderate pain  Notes to patient: For pain      Take at least 2 hours after aspirin    Can take ONE tablet if needed       levothyroxine 100 MCG tablet  Also known as: SYNTHROID/LEVOTHROID  TAKE 1 TABLET BY MOUTH EVERY DAY  Notes to patient: Thyroid supplement              lisinopril 40 MG tablet  Also  known as: ZESTRIL  Take 1 tablet (40 mg) by mouth daily  Notes to patient: For blood pressure               metFORMIN 500 MG 24 hr tablet  Also known as: GLUCOPHAGE XR  Take 2 tablets (1,000 mg) by mouth 2 times daily (with meals)  Notes to patient: For lowering blood sugars                thin lancets  Also known as: NO BRAND SPECIFIED  Use with lanceting device. To accompany: Blood Glucose Monitor Brands: per insurance.

## 2023-12-06 NOTE — PROGRESS NOTES
Medication Therapy Management (MTM) Encounter    ASSESSMENT:                            Medication Adherence/Access: Provided patient with a detailed medication list and when to take each medication to help with organizing his pillbox.  I was also able to contact his pharmacy to it provide them with his medical assistance information so now his prescriptions will be no charge so he does not run out of medications due to cost.  I have verified that all of the medications he is out of today are filled and ready for pickup as well as his glucose meter and supplies.    Diabetes:   Patient is not meeting A1c goal of < 7%.  Microalbumin is not at goal <30mg/g. Taking lisinopril at max dose.  Patient would benefit from picking up his glucose meter and starting with checking blood sugars fasting.  I did review with him what his goal blood sugar is, see plan.  He would also benefit from a diabetes education visit, ordered at last visit and provided phone number to call and schedule today.  Will assess blood sugars at next visit and determine if an additional medication is needed.  Adding a GLP-1 would be very helpful not only for better blood sugar management but also to help with weight reduction.  Patient does have a history of papillary thyroid cancer with thyroidectomy.  Per the manufacture,  Ozempic can be used in patients with thyroid conditions (whether previous or current), including papillary thyroid carcinoma (PTC), hypothyroidism, hyperthyroidism, thyroid nodules, multinodular goiter, and a history of thyroidectomy, as long as there is no personal or family history of medullary thyroid cancer or MEN 2.  Will discuss starting a GLP-1 with his primary care provider.  I again advised him to take aspirin at least 2 hours before diclofenac since diclofenac can reduced antiplatelet efficacy.    Hypertension:   Patient is meeting blood pressure goal of < 140/90mmHg. We will need to repeat BMP at next visit.    At future  visit we will also assess his lipids after he has a fasting lipid panel done at next visit.    PLAN:                            Please call [phone] to schedule diabetes education visit.  Take one diclofenac 50 mg tablet in the morning but wait at least 2 hours after taking aspirin and if needed at night you can take ONE more.  Take aspirin 2 hours before diclofenac so aspirin can have it's effect before you take diclofenac.  Have a separate bottle in your car with some tablets in it so you can take this 2 hours after you take your morning pills.  Try Theraworx topical magnesium for your leg cramps.  Look into additional prescription assistance through the state so you don't fun out of pill due to not being able to afford them.   a larger pillbox with morning and evening slots just need to get one with bigger boxes so it is easier to fill and take things out.   all your meds from [facility], they are all no charge with the exception of the aspirin which might be $2.11.  When you get home finish filling you pillbox. Be careful because some of them already have some of the pills in them.  If you have multiple bottles of the same pills I recommend putting a red band around them to keep them together.  Start checking your blood sugar in the morning fasting before you eat or drink anything every day.  Your morning fasting blood sugars should be between 80 and 130.  At next visit bring in all your pil bottles, pillbox, glucose meter and supplies.    Follow-up: Return in 4 weeks (on 1/3/2024) for MTM office visit with Patricia Oliver.    SUBJECTIVE/OBJECTIVE:                          Johan Cole is a 58 year old male coming in for a follow-up visit from 11/27/23.       Reason for visit: diabetes follow-up and glucose meter teaching.    Allergies/ADRs: Reviewed in chart  Past Medical History: Reviewed in chart  Tobacco: He reports that he has never smoked. His smokeless tobacco use includes chew.  Alcohol: not  currently using    Medication Adherence/Access: Unable to assess adherence with him not having all of his medications here today and his pillbox not being filled up completely. Medication barrier is cost and not being able to afford them.  He is out of the following meds due to not having money to pick them up: levothyroxine, amlodipine, chlorthalidone, atorvastatin, aspirin.     Diabetes   Metformin ER 1000 mg twice daily  Lisinopril 40 mg once daily  Aspirin 81mg daily  Patient is not experiencing side effects.  Blood sugar monitoring: never  Current diabetes symptoms: none  Diet/Exercise: He eats twice a day and does not snack in between. He does not drink regular pop or juice. He does have an energy drink once a month. He likes to eat vegetables and pasta. He does not know what foods increase his blood sugars. He has not met with diabetes education but would be willing.        Eye exam in the last 12 months? No    Foot exam: due  Urine Albumin:   Lab Results   Component Value Date    UMALCR 30.00 (H) 05/22/2023      Lab Results   Component Value Date    A1C 9.5 (H) 10/19/2023     Wt Readings from Last 5 Encounters:   12/06/23 239 lb (108.4 kg)   10/19/23 238 lb 1.6 oz (108 kg)   09/14/23 254 lb (115.2 kg)   08/25/23 246 lb (111.6 kg)   06/15/23 245 lb (111.1 kg)     Hypertension   Amlodipine 5 mg once daily in the evening  Lisinopril 40 mg once daily - restarted at last MTM visit  Chlorthalidone 25 mg daily  Patient reports no current medication side effects  Patient does not self-monitor blood pressure.       BP Readings from Last 3 Encounters:   12/06/23 120/83   10/19/23 127/88   09/14/23 125/75     Pulse Readings from Last 3 Encounters:   12/06/23 77   10/19/23 78   08/25/23 75     Today's Vitals: /83   Pulse 77   Wt 239 lb (108.4 kg)   SpO2 97%   BMI 38.59 kg/m    ----------------      I spent 60 minutes with this patient today. All changes were made via collaborative practice agreement with Ap  Chase Alvarez MD. A copy of the visit note was provided to the patient's provider(s).    A summary of these recommendations was given to the patient and mailed to his sister Lynsey per patient request.    Patricia Oliver, PharmD  Medication Therapy Management Pharmacist     Medication Therapy Recommendations  Type 2 diabetes mellitus with hyperglycemia, with long-term current use of insulin (H)    Current Medication: metFORMIN (GLUCOPHAGE XR) 500 MG 24 hr tablet   Rationale: Cannot afford medication product - Cost - Adherence   Recommendation: Provide Adherence Intervention   Status: Patient Agreed - Adherence/Education   Note: Contacted pharmacy to add MA insurance so he no longer has a cost for his medications.

## 2023-12-14 ENCOUNTER — PATIENT OUTREACH (OUTPATIENT)
Dept: CARE COORDINATION | Facility: CLINIC | Age: 58
End: 2023-12-14
Payer: COMMERCIAL

## 2023-12-14 ENCOUNTER — TELEPHONE (OUTPATIENT)
Dept: CARE COORDINATION | Facility: CLINIC | Age: 58
End: 2023-12-14
Payer: COMMERCIAL

## 2023-12-14 NOTE — TELEPHONE ENCOUNTER
Alexx Esteban,    I spoke with pt's sister, Lynsey, this afternoon.    She knew you were looking into what type of thyroid cancer pt had, as this would determine if he were able to take Ozempic.    Lynsey states she has not heard back from you regarding the research you have done.    Would you please give Lynsey a call regarding this matter?    Thanks so much!    Let me know if you have further questions.    Thanks!    Shelly Bray  Community Health Worker  [facility]  [phone]

## 2023-12-14 NOTE — PROGRESS NOTES
Clinic Care Coordination Contact  Community Health Worker Follow Up    Care Gaps: Discussed need for eye exam again. Lynsey has forgotten about this exam, but will schedule.    Health Maintenance Due   Topic Date Due    YEARLY PREVENTIVE VISIT  Never done    DIABETIC FOOT EXAM  Never done    ADVANCE CARE PLANNING  Never done    EYE EXAM  Never done    Pneumococcal Vaccine: Pediatrics (0 to 5 Years) and At-Risk Patients (6 to 64 Years) (1 - PCV) Never done    COLORECTAL CANCER SCREENING  Never done    HEPATITIS B IMMUNIZATION (3 of 3 - 19+ 3-dose series) 09/21/2016    ZOSTER IMMUNIZATION (1 of 2) Never done    INFLUENZA VACCINE (1) 09/01/2023    COVID-19 Vaccine (4 - 2023-24 season) 09/01/2023       Care Gap Goal set: Yes    Care Plan:   Care Plan: Health Maintenance       Problem: Health Maintenance Due or Overdue       Goal: Become up-to-date with health maintenance visit(s)       Start Date: 9/26/2023 Expected End Date: 12/29/2023    This Visit's Progress: 60% Recent Progress: 50%    Priority: High    Note:     Barriers: Pt has multiple comorbidities  Strengths: SisterLynsey, very involved in pt's care, enrolled in CC  Patient expressed understanding of goal: Lynsey expresses understanding  Action steps to achieve this goal:  1. My sister, Lynsey, will schedule OV with Dr. Alvarez to address DM concerns (completed)  2. My sister, Lynsey, and I will address overdue health maintenance with Dr. Alvarez (discussed)  3. My sister, Lynsey, and I will schedule preventative care visit with Dr. Alvarez   4. My sister, Lynsey, and I will schedule eye exam   5. My sister, Lynsey, and I will look over ACP documents sent by CHW on 12/15/23  6. My sister, Lynsey, and I will fill out ACP documents, notarize them, and return to [facility] clinic to be uploaded to [email or web address].  7. I will stay in touch with CHWShelly, with questions or concerns     Goal updated 12/14/23                                Intervention and Education during outreach:   [place]  to pt's sister, Lynsey. CTC signed 5/22/23.   Meeting with the pharmacist, Eulalia Oliver RPH, for VV on 11/27/23 and OV on 12/6/23, which was very helpful, per Lynsey. However, pt has gotten a different glucose meter which he does not know how to operate, so Lynsey and pt will need another visit with MTM to determine how to use. Lynsey has Eulalia Oliver's phone number and will call her directly to set up this appointment.  Advanced Care Planning - discussed with Lynsey. Discussed importance of designating a Healthcare Agent and completing [employer] documents in case Johan would not be able to answer medical care directions for himself. Lynsey was interested in looking over these documents and would like them emailed to her: [email or web address]. Writer communicated the risks of unencrypted electronic communication and the patient and/or patient representative has agreed to accept the risks and receive unencrypted communication related to the information or resources we have discussed. We reviewed that no PHI will be included.  Email address verified with the patient.    Lynsey states, MTM, Eulalia Oliver, was going to check into what type of thyroid cancer pt had. Depending on the type, he could begin taking Ozempic. CHW will message Eulalia Oliver RPH, regarding this matter and for Eulalia to contact Lynsey with the answer.    Addended 12/15/23:   Sent the following email to Lynsey at [email or web address] this morning:    I have attached the following information: Honoring Choices short form, long form, health care goals, and health care agent.    The [facility] Honoring Choices website, https://[email or web address].org/resources/patients-[email or web address]-visitors/honoring-choices, is a wonderful site which might answer many of your questions.  [email or web address]  Honoring Choices phone number: [phone]      CHW asks if Lynsey has any further concerns or need for resources as this time. Lynsey states she  does not. CHW made sure Lynsey has CHW contact information. Lynsey will contact CHW with questions or updates before next outreach.     CHW Plan: CHW will email pt's sister, Lynsey, advanced care documents. CHW will message Eulalia Oliver, RP, MTM, regarding Lynsey's question about pt's type of thyroid cancer. CHW will follow up with pt in one month.    Shelly Bray  Community Health Worker  [facility]  [phone]

## 2023-12-18 ENCOUNTER — PATIENT OUTREACH (OUTPATIENT)
Dept: CARE COORDINATION | Facility: CLINIC | Age: 58
End: 2023-12-18
Payer: COMMERCIAL

## 2023-12-18 ASSESSMENT — ACTIVITIES OF DAILY LIVING (ADL): DEPENDENT_IADLS:: INDEPENDENT

## 2023-12-18 NOTE — TELEPHONE ENCOUNTER
Called Lynsey, patient sister, and left voicemail to have her call me back to discuss. There is a consent to communicate on file.      After discussing with his primary care provider Dr. Alvarez, he would be okay with Johan starting Ozempic since he had complete thyroidectomy in 2007 and no cancer reoccurrence since. Will discuss with patient at upcoming visit in January.     Patricia Oliver, PharmD  Medication Therapy Management Pharmacist

## 2023-12-18 NOTE — PROGRESS NOTES
Clinic Care Coordination Contact  Care Coordination Clinician Chart Review    Situation: Patient chart reviewed by Care Coordinator.       Background: Care Coordination Program started: 5/22/2023. Initial assessment completed and patient-centered care plan(s) were developed with participation from patient. Lead CC handed patient off to CHW for continued outreaches.       Assessment: Per chart review, patient outreach completed by CC CHW on 12/14/2023.  Patient is actively working to accomplish goal(s). Patient's goal(s) appropriate and relevant at this time. Patient is not due for updated Plan of Care.  Assessments will be completed annually or as needed/with change of patient status.      Care Plan: Health Maintenance       Problem: Health Maintenance Due or Overdue       Goal: Become up-to-date with health maintenance visit(s)       Start Date: 9/26/2023 Expected End Date: 2/1/2024    Recent Progress: 60%    Priority: High    Note:     Barriers: Pt has multiple comorbidities  Strengths: Lynsey Jacinto, very involved in pt's care, enrolled in CC  Patient expressed understanding of goal: Lynsey expresses understanding  Action steps to achieve this goal:  1. My sister, Lynsey, will schedule OV with Dr. Alvarez to address DM concerns (completed)  2. My sister, Lynsey, and I will address overdue health maintenance with Dr. Alvarez (discussed)  3. My sister, Lynsey, and I will schedule preventative care visit with Dr. Alvarez   4. My sister, Lynsey, and I will schedule eye exam   5. My sister, Lynsey, and I will look over ACP documents sent by CHW on 12/15/23  6. My sister, Lynsey, and I will fill out ACP documents, notarize them, and return to [facility] clinic to be uploaded to [employer].  7. I will stay in touch with CHWShelly, with questions or concerns     Goal updated 12/14/23                                     Plan/Recommendations: The patient will continue working with Care Coordination to achieve goal(s) as above. CHW will  continue outreaches at minimum every 30 days and will involve Lead CC as needed or if patient is ready to move to Maintenance. Lead CC will continue to monitor CHW outreaches and patient's progress to goal(s) every 6 weeks.     Plan of Care updated and sent to patient: Cheryl Keenan [facility]  Clinic Care Coordinator  [facility]  [phone]  [email or web address]

## 2023-12-20 NOTE — TELEPHONE ENCOUNTER
Called Lynsey again today and spoke to her. Relayed the message below.     Patricia Oliver, PharmD  Medication Therapy Management Pharmacist

## 2024-01-10 ENCOUNTER — OFFICE VISIT (OUTPATIENT)
Dept: PHARMACY | Facility: CLINIC | Age: 59
End: 2024-01-10
Payer: MEDICAID

## 2024-01-10 ENCOUNTER — LAB (OUTPATIENT)
Dept: LAB | Facility: CLINIC | Age: 59
End: 2024-01-10
Payer: COMMERCIAL

## 2024-01-10 VITALS
OXYGEN SATURATION: 96 % | DIASTOLIC BLOOD PRESSURE: 71 MMHG | BODY MASS INDEX: 37.84 KG/M2 | SYSTOLIC BLOOD PRESSURE: 106 MMHG | WEIGHT: 234.3 LBS | HEART RATE: 78 BPM

## 2024-01-10 DIAGNOSIS — I10 BENIGN ESSENTIAL HYPERTENSION: ICD-10-CM

## 2024-01-10 DIAGNOSIS — E78.5 HYPERLIPIDEMIA LDL GOAL <100: ICD-10-CM

## 2024-01-10 DIAGNOSIS — Z79.4 TYPE 2 DIABETES MELLITUS WITH HYPERGLYCEMIA, WITH LONG-TERM CURRENT USE OF INSULIN (H): Primary | ICD-10-CM

## 2024-01-10 DIAGNOSIS — E11.65 TYPE 2 DIABETES MELLITUS WITH HYPERGLYCEMIA, WITH LONG-TERM CURRENT USE OF INSULIN (H): Primary | ICD-10-CM

## 2024-01-10 DIAGNOSIS — M79.661 PAIN OF RIGHT LOWER LEG: ICD-10-CM

## 2024-01-10 LAB
ANION GAP SERPL CALCULATED.3IONS-SCNC: 13 MMOL/L (ref 7–15)
BUN SERPL-MCNC: 18.2 MG/DL (ref 6–20)
CALCIUM SERPL-MCNC: 9.6 MG/DL (ref 8.6–10)
CHLORIDE SERPL-SCNC: 103 MMOL/L (ref 98–107)
CHOLEST SERPL-MCNC: 163 MG/DL
CREAT SERPL-MCNC: 1.2 MG/DL (ref 0.67–1.17)
DEPRECATED HCO3 PLAS-SCNC: 25 MMOL/L (ref 22–29)
EGFRCR SERPLBLD CKD-EPI 2021: 71 ML/MIN/1.73M2
FASTING STATUS PATIENT QL REPORTED: YES
GLUCOSE SERPL-MCNC: 190 MG/DL (ref 70–99)
HDLC SERPL-MCNC: 33 MG/DL
LDLC SERPL CALC-MCNC: 91 MG/DL
NONHDLC SERPL-MCNC: 130 MG/DL
POTASSIUM SERPL-SCNC: 3.4 MMOL/L (ref 3.4–5.3)
SODIUM SERPL-SCNC: 141 MMOL/L (ref 135–145)
TRIGL SERPL-MCNC: 194 MG/DL

## 2024-01-10 PROCEDURE — 36415 COLL VENOUS BLD VENIPUNCTURE: CPT

## 2024-01-10 PROCEDURE — 99607 MTMS BY PHARM ADDL 15 MIN: CPT | Performed by: PHARMACIST

## 2024-01-10 PROCEDURE — 99606 MTMS BY PHARM EST 15 MIN: CPT | Performed by: PHARMACIST

## 2024-01-10 PROCEDURE — 80061 LIPID PANEL: CPT

## 2024-01-10 PROCEDURE — 80048 BASIC METABOLIC PNL TOTAL CA: CPT

## 2024-01-10 NOTE — PATIENT INSTRUCTIONS
Recommendations from today's MTM visit:                                                       Start new diabetes medicine  Start Ozempic 0.25 mg subcutaneous injection once weekly for 4 weeks, then increase to Ozempic 0.5 mg weekly starting on Week 5 if tolerating.   Refrigerate until first use. Then may keep at room temperature for up to 8 weeks   Potential side effects: nausea, headache, diarrhea, stomach upset.  If these become unmanageable or concerning symptoms, please make sure to call.  The first Ozempic pen fill will last 6 weeks, all fills of the Ozempic pen after this will last 4 weeks.   Call josselin and refill chlorthalidone and lisinopril.  Call [phone] to schedule diabetes education visit to learn more about what foods increase blood sugars.  Please schedule visit with Dr. Alvarez to discuss balance and hip/leg pain. Recommend physical therapy to help with this and see if he would like to do anything else.  Start checking blood sugars when you wake up first thing before eating or drinking anything. This is your fasting reading. Check your blood sugar when you get home from work before you eat anything. Only check once daily and alternate between fasting and before bed.  Diabetes Goals:    Home Monitoring of Blood Sugars:Fasting  mg/dL and 2 hours after a meal less than 150 mg/dL.    Hemoglobin A1C: Less than 7%. Yours is   Lab Results   Component Value Date    A1C 9.5 10/19/2023     Blood Pressure: Less than 140/90mmHg. Yours is There were no vitals taken for this visit..    Cholesterol: You are taking atorvastatin to help decrease the risk of heart disease.    Things you can do to help lower your blood sugars:    Diet: 3 meals and 1-2 snacks per day with 45-60 grams of carbohydrates per meal and 15-30 grams of carbohydrates per snack. Try to fill your plate at least half-full with vegetables, fill one-quarter full with lean meats or protein, and also make sure you get at least some  carbohydrate with every meal.    Exercise: 30 minutes per day of anything that will increase your heart rate and make you break a sweat! Gardening, walking, cleaning the house, changing the oil in your car, etc. If you feel like 30 minutes per day is too much, start small. Even lifting canned foods or working your arms with a resistance band in front of the TV can help.    What is hypoglycemia (low blood sugar):  Hypoglycemia is when blood sugar levels become too low - below 70 m/dl.      What causes hypoglycemia?  - using too much insulin  -taking too many diabetes pills  -not eating enough, or skipping meals or snacks  -not eating enough carbohydrate with meals  -changing your exercise routine  -drinking alcohol in excess    It is also possible to have hypoglycemia even when you are carefully managing your blood sugar levels.    What does it feel like when blood sugars get too low?  You may feel:  - anxious  -confused  -dizzy  -hungry  -light-headed  -nervous  -shaky  -sleepy  -sweaty    You may have  -blurred or cloudy vision  -heart palpitations (heart skips a beat or races)  -tingling or numbness around the mouth and tongue  -tremors    What to do if you have symptoms of hypoglycememia:  If you think your blood sugar is too low, check it with a glucose meter.  If its below 70 mg/dl, consume one of the following:  Fruit juice (1/2 cup)  Glucose tablets (15 grams)  Hard candy (5 to 7 pieces)  Honey or sugar (2 teaspoons)  Milk (1/2 cup)  Soft drink (non-diet, 1/2 cup)    Wait 15 minutes and check your blood glucose again.  IF it is still below 70 mg/dl, have another food item listed above. Wait another 15 minutes and repeat the blood glucose test.  Have a small meal or snack that contains some carbohydrate after your blood glucose rises above 70 mg/dl.    If you are at risk of hypoglycemia, always carry with you glucose tablets or one of the foods listed above.      To prevent Hypoglycemia:  Avoid situations that  "may cause hypoglycemia  Before making any change to your diet or exercise routine, discuss them with your healthcare provider  Keep a record of your blood glucose levels.  Include the time of day, diabetes medications, when you had your last meal or snack, and what you were doing at the time (e.g. Watching TV, gardening, jogging, etc).    Talk to your healthcare provider if your blood glucose levels are often low      Follow-up: Return in 5 weeks (on 2/14/2024) for MTM office visit with Patricia Oliver.    It was great speaking with you today.  I value your experience and would be very thankful for your time in providing feedback in our clinic survey. In the next few days, you may receive an email or text message from [employer] with a link to a survey related to your  clinical pharmacist.\"     To schedule another MTM appointment, please call the clinic directly or you may call the MTM scheduling line at [phone] or toll-free at [phone].     My Clinical Pharmacist's contact information:                                                      Please feel free to contact me with any questions or concerns you have.      Patricia Oliver, PharmD  Medication Therapy Management Pharmacist  [facility] [address] - Monday and Wednesday 7:30 - 4:00                           Medication List            Accurate as of January 10, 2024 10:28 AM. If you have any questions, ask your nurse or doctor.                START taking these medications          Morning Afternoon Evening Bedtime    semaglutide 2 MG/3ML pen  Also known as: OZEMPIC  Inject 0.25 mg Subcutaneous every 7 days For 4 weeks then increase to 0.5 mg once weekly  Started by: Eulalia Oliver [facility]  Notes to patient: For diabetes           ONCE WEEKLY ON MONDAYS            CONTINUE taking these medications          Morning Afternoon Evening Bedtime    amLODIPine 5 MG tablet  Also known as: NORVASC  TAKE 1 TABLET BY MOUTH AT BEDTIME  Notes to patient: For blood pressure       "         aspirin 81 MG EC tablet  Take 1 tablet (81 mg) by mouth daily  Notes to patient: To prevent heart attack and stroke               atorvastatin 40 MG tablet  Also known as: LIPITOR  Take 1 tablet (40 mg) by mouth daily  Notes to patient: For cholesterol               blood glucose monitoring meter device kit  Also known as: NO BRAND SPECIFIED  Use to test blood sugar 1 times daily or as directed. Preferred blood glucose meter OR supplies to accompany: Blood Glucose Monitor Brands: per insurance.      Check once daily - Alternate between fasting and before bed          blood glucose test strip  Also known as: NO BRAND SPECIFIED  Use to test blood sugar 1 times daily or as directed. To accompany: Blood Glucose Monitor Brands: per insurance.      Check once daily - Alternate between fasting and before bed          chlorthalidone 25 MG tablet  Also known as: HYGROTON  Take 1 tablet (25 mg) by mouth daily  Notes to patient: For blood pressure               diclofenac 50 MG EC tablet  Also known as: VOLTAREN  Take 1 tablet (50 mg) by mouth 2 times daily as needed for moderate pain             levothyroxine 100 MCG tablet  Also known as: SYNTHROID/LEVOTHROID  TAKE 1 TABLET BY MOUTH EVERY DAY  Notes to patient: For thyroid supplement               lisinopril 40 MG tablet  Also known as: ZESTRIL  Take 1 tablet (40 mg) by mouth daily  Notes to patient: For blood pressure and kidney protection                metFORMIN 500 MG 24 hr tablet  Also known as: GLUCOPHAGE XR  Take 2 tablets (1,000 mg) by mouth 2 times daily (with meals)  Notes to patient: For diabetes                 thin lancets  Also known as: NO BRAND SPECIFIED  Use with lanceting device. To accompany: Blood Glucose Monitor Brands: per insurance.      Check once daily - Alternate between fasting and before bed                   Where to Get Your Medications        These medications were sent to [id number], [place] - [address] [place] [address]  AT [facility] [address]  [address] [facility] [address]      Phone: [phone]   semaglutide 2 MG/3ML pen

## 2024-01-10 NOTE — PROGRESS NOTES
Medication Therapy Management (MTM) Encounter    ASSESSMENT:                            Medication Adherence/Access: Provided him with a detailed medication list today to help him set up his pillbox correctly.  Educated him that he takes 5 pills in the morning and evening and should count them when filling to ensure he has all of the pills.   Recommended that he have his sister help him set up his dose reminder pillbox he received.    Diabetes:   Patient is not meeting A1c goal of < 7%.  Microalbumin is not at goal <30 mg/g. Taking lisinopril at max dose.  Patient would benefit from starting Ozempic.  Educated him on how to use his blood glucose meter and recommended he check once daily alternating between fasting and postprandial.  Encouraged him to schedule a diabetes education visit and provided him the number to call.  Educated him on signs and symptoms of hypoglycemia and how to treat.    Hypertension:   Patient is meeting blood pressure goal of < 140/90mmHg.    Hyperlipidemia:   Patient would benefit from rechecking fasting lipid panel since starting statin in June of 2023. Sent to lab today.    Hip pain:   Recommended he discuss his hip pain with Dr. Alvarez at upcoming visit and to schedule I sooner if pain worsens or more concerns arise.    PLAN:                            Start new diabetes medicine  Start Ozempic 0.25 mg subcutaneous injection once weekly for 4 weeks, then increase to Ozempic 0.5 mg weekly starting on Week 5 if tolerating.   Refrigerate until first use. Then may keep at room temperature for up to 8 weeks   Potential side effects: nausea, headache, diarrhea, stomach upset.  If these become unmanageable or concerning symptoms, please make sure to call.  The first Ozempic pen fill will last 6 weeks, all fills of the Ozempic pen after this will last 4 weeks.   Call josselin and refill chlorthalidone and lisinopril.  Call [phone] to schedule diabetes education visit to learn more about  what foods increase blood sugars.  Please schedule visit with Dr. Alvarez to discuss balance and hip/leg pain. Recommend physical therapy to help with this and see if he would like to do anything else.  Start checking blood sugars when you wake up first thing before eating or drinking anything. This is your fasting reading. Check your blood sugar when you get home from work before you eat anything. Only check once daily and alternate between fasting and before bed.  Provided him with information on symptoms and how to manage hypoglycemia, see AVS    Follow-up: Return in 5 weeks (on 2/14/2024) for MTM office visit with Patricia Oliver.    SUBJECTIVE/OBJECTIVE:                          Johan Cole is a 58 year old male coming in for a follow-up visit from 12/6/23.       Reason for visit: follow-up diabetes.    Allergies/ADRs: Reviewed in chart  Past Medical History: Reviewed in chart  Tobacco: He reports that he has never smoked. His smokeless tobacco use includes chew.  Alcohol: not currently using    Medication Adherence/Access: no issues reported.  He did bring his pillbox along with all of his pill bottles in today.  He did have a couple of pills missing from a couple of the boxes.  He was also missing his bottle of chlorthalidone and unsure when he ran out.  [facility] but has not set it up yet.    Diabetes   Metformin ER 1000 mg twice daily  Lisinopril 40 mg once daily  Aspirin 81mg daily  Patient is not experiencing side effects.  Blood sugar monitoring: has not started but brought in meter today for teaching  Current diabetes symptoms: none  Diet/Exercise: He has been eating more protein and yogurt. He doesn't eat a lot of bread. He has cut back a little on pasta. He does not drink regular pop or juice. He does have an energy drink once a month. He has not met with diabetes education but would be willing.        Eye exam in the last 12 months? No    Foot exam: due  Urine  Albumin:   Lab Results   Component Value Date    UMALCR 30.00 (H) 05/22/2023      Lab Results   Component Value Date    A1C 9.5 (H) 10/19/2023       Hypertension   Amlodipine 5 mg once daily in the evening  Lisinopril 40 mg once daily   Chlorthalidone 25 mg daily  Patient reports no current medication side effects  Patient does not self-monitor blood pressure.         BP Readings from Last 3 Encounters:   01/10/24 106/71   12/06/23 120/83   10/19/23 127/88     Pulse Readings from Last 3 Encounters:   01/10/24 78   12/06/23 77   10/19/23 78       Hyperlipidemia   atorvastatin 40 mg daily  Patient reports no significant myalgias or other side effects.  The 10-year ASCVD risk score (Sushil SHEIKH, et al., 2019) is: 17.9%    Values used to calculate the score:      Age: 58 years      Sex: Male      Is Non- : No      Diabetic: Yes      Tobacco smoker: No      Systolic Blood Pressure: 106 mmHg      Is BP treated: Yes      HDL Cholesterol: 36 mg/dL      Total Cholesterol: 279 mg/dL       Recent Labs   Lab Test 05/22/23  0917 04/07/22  1419   CHOL 279* 295*   HDL 36* 29*   * 199*   TRIG 213* 337*     Hip pain:    Diclofenac 50 mg one twice daily - not taking as it doesn't seem to help    He has pain anytime he is moving around. The pain starts in his hip and goes down to his knee.  He did have a right hip replacement and is wondering if it is from that.    Today's Vitals: There were no vitals taken for this visit.  ----------------    I spent 60 minutes with this patient today. All changes were made via collaborative practice agreement with Ap Alvarez MD. A copy of the visit note was provided to the patient's provider(s).    A summary of these recommendations was given to the patient and mailed to his Sister Lynsey per patient request.    Patricia Oliver, PharmD  Medication Therapy Management Pharmacist     Medication Therapy Recommendations  Type 2 diabetes mellitus with hyperglycemia, with  long-term current use of insulin (H)    Current Medication: metFORMIN (GLUCOPHAGE XR) 500 MG 24 hr tablet   Rationale: Synergistic therapy - Needs additional medication therapy - Indication   Recommendation: Start Medication - Ozempic (0.25 or 0.5 MG/DOSE) 2 MG/1.5ML Sopn   Status: Accepted per Provider

## 2024-01-11 ENCOUNTER — TELEPHONE (OUTPATIENT)
Dept: FAMILY MEDICINE | Facility: CLINIC | Age: 59
End: 2024-01-11
Payer: COMMERCIAL

## 2024-01-11 NOTE — TELEPHONE ENCOUNTER
Sister calls, CTC on file, informed, agrees, also informs neuropsych appointment on 1/24/24 needs to be canceled as got him in earlier elsewhere, appointment canceled  Tati Torres RN, BSN  [facility]

## 2024-01-11 NOTE — TELEPHONE ENCOUNTER
----- Message from Eulalia Oliver [facility] sent at 1/10/2024  7:33 PM CST -----  Creatinine did jump up a little after restarting lisinopril about a month ago but increase was not more than 30% so will not make changes. Will recheck at next visit. Should make sure patient is staying well hydrated and avoiding over the counter NSAIDs. He did mention that he stopped taking the diclofenac and he should continue to avoid this. Will route to RN triage to contact patient to relay this message. Also let him know that his cholesterol looks good and has improved so no change with his atorvastatin.     Patricia Oliver, PharmD  Medication Therapy Management Pharmacist

## 2024-01-11 NOTE — TELEPHONE ENCOUNTER
Called patient and left voicemail to call back and ask to speak to any triage nurse.     Bonita Cazares RN

## 2024-01-11 NOTE — RESULT ENCOUNTER NOTE
Called patient and left voicemail to call back and ask to speak to any triage nurse.   See telephone encounter 1/11/24    Bonita Cazares RN

## 2024-01-17 ENCOUNTER — NURSE TRIAGE (OUTPATIENT)
Dept: FAMILY MEDICINE | Facility: CLINIC | Age: 59
End: 2024-01-17
Payer: COMMERCIAL

## 2024-01-17 NOTE — TELEPHONE ENCOUNTER
Agree with appointment. Ok to be seen tomorrow.     Ravi Modi MD  [facility] [place]  1/17/2024

## 2024-01-17 NOTE — TELEPHONE ENCOUNTER
"S-(situation): Pt calling to schedule appt for increasing right hip pain >1-2 weeks    B-(background): Pt has had on and off R hip pain for >1 year but has worsened in past 1-2 weeks   8/10 throbbing pain   Radiates a little to groin and waistline at times  Limited ROM due to pain - has to lift leg with hands  Able to bear weight and walk but very small steps at a time  Exacerbated by lifting leg, coughing or sneezing    Hx of R hip replacement 10 years ago    A-(assessment):   Hip is a little warm to touch  Denies neurologic deficits, rash, trauma, dislocation/deformity, fall, skin is intact, no redness or discoloration, fever, difficulty breathing   Is not taking any medication for pain      R-(recommendations):   Per protocol, pt seen in clinic today for severe pain  Pt can not come to clinic today- has to work at 2pm   Provided home care advise in mean time    Routed to PCP and team to review and follow-up with patient with availability tomorrow      Reason for Disposition   SEVERE pain (e.g., excruciating, unable to do any normal activities)    Additional Information   Negative: Looks like a broken bone or dislocated joint (e.g., crooked or deformed)   Negative: Sounds like a life-threatening emergency to the triager   Negative: SEVERE pain (e.g., excruciating, unable to do any normal activities) and fever   Negative: Can't stand (bear weight) or walk   Negative: Fever and red area (or area very tender to touch)   Negative: Patient sounds very sick or weak to the triager   Negative: Red area or streak > 2 inches (or 5 cm)    Answer Assessment - Initial Assessment Questions  1. LOCATION and RADIATION: \"Where is the pain located?\"       Right hip  2. QUALITY: \"What does the pain feel like?\"  (e.g., sharp, dull, aching, burning)      throbbing  3. SEVERITY: \"How bad is the pain?\" \"What does it keep you from doing?\"   (Scale 1-10; or mild, moderate, severe)    -  MILD (1-3): doesn't interfere with normal activities " "    -  MODERATE (4-7): interferes with normal activities (e.g., work or school) or awakens from sleep, limping     -  SEVERE (8-10): excruciating pain, unable to do any normal activities, unable to walk      8/10  4. ONSET: \"When did the pain start?\" \"Does it come and go, or is it there all the time?\"      >1 year but worsening over the past week  5. WORK OR EXERCISE: \"Has there been any recent work or exercise that involved this part of the body?\"       no  6. CAUSE: \"What do you think is causing the hip pain?\"       Unsure  Hx of hip replacement 10 yrs ago  7. AGGRAVATING FACTORS: \"What makes the hip pain worse?\" (e.g., walking, climbing stairs, running)      Lifting leg, coughing, sneezing  8. OTHER SYMPTOMS: \"Do you have any other symptoms?\" (e.g., back pain, pain shooting down leg,  fever, rash)      none    Protocols used: Hip Pain-A-OH    Sherlyn QUIROS RN  [facility]    "

## 2024-01-18 ENCOUNTER — ANCILLARY PROCEDURE (OUTPATIENT)
Dept: GENERAL RADIOLOGY | Facility: CLINIC | Age: 59
End: 2024-01-18
Attending: NURSE PRACTITIONER
Payer: COMMERCIAL

## 2024-01-18 ENCOUNTER — OFFICE VISIT (OUTPATIENT)
Dept: FAMILY MEDICINE | Facility: CLINIC | Age: 59
End: 2024-01-18
Payer: COMMERCIAL

## 2024-01-18 VITALS
WEIGHT: 235.1 LBS | TEMPERATURE: 98 F | BODY MASS INDEX: 39.17 KG/M2 | DIASTOLIC BLOOD PRESSURE: 78 MMHG | HEIGHT: 65 IN | OXYGEN SATURATION: 96 % | HEART RATE: 65 BPM | SYSTOLIC BLOOD PRESSURE: 118 MMHG | RESPIRATION RATE: 18 BRPM

## 2024-01-18 DIAGNOSIS — M25.551 HIP PAIN, RIGHT: Primary | ICD-10-CM

## 2024-01-18 DIAGNOSIS — M25.551 HIP PAIN, RIGHT: ICD-10-CM

## 2024-01-18 PROCEDURE — 73502 X-RAY EXAM HIP UNI 2-3 VIEWS: CPT | Mod: TC | Performed by: RADIOLOGY

## 2024-01-18 PROCEDURE — 99214 OFFICE O/P EST MOD 30 MIN: CPT | Performed by: NURSE PRACTITIONER

## 2024-01-18 RX ORDER — BLOOD-GLUCOSE METER
EACH MISCELLANEOUS
COMMUNITY
Start: 2023-12-06

## 2024-01-18 ASSESSMENT — ENCOUNTER SYMPTOMS
CONSTITUTIONAL NEGATIVE: 1
JOINT SWELLING: 0
CARDIOVASCULAR NEGATIVE: 1
FATIGUE: 0
RESPIRATORY NEGATIVE: 1
BACK PAIN: 0
ENDOCRINE NEGATIVE: 1
GASTROINTESTINAL NEGATIVE: 1
MYALGIAS: 0
FEVER: 0
NEUROLOGICAL NEGATIVE: 1

## 2024-01-18 ASSESSMENT — PAIN SCALES - GENERAL: PAINLEVEL: EXTREME PAIN (8)

## 2024-01-18 NOTE — TELEPHONE ENCOUNTER
Attempted to call pt, pt's sister Lynsey answered (CTC on file).  Pt is at work but Lynsey said she helps pt with all his appts.  Scheduled appt for 1/18/24, Meredith Victor approved same day slot.    Lynsey said she will let pt know.     Homa Gomez RN, BSN  [facility]

## 2024-01-18 NOTE — LETTER
January 18, 2024      Johan Cole  [address] [place] [id number]        To Whom It May Concern:    Johan Cole was seen in our clinic. He may return to work with the following: No climbing.  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  No walking for more than 5-10 minutes at a time.  Should be allowed to rest to relieve pain after walking for 5-10 minutes      Sincerely,      Melba Victor

## 2024-01-18 NOTE — PROGRESS NOTES
Assessment & Plan     ICD-10-CM    1. Hip pain, right  M25.551 XR Hip Right 2-3 Views     Orthopedic  Referral          Right hip pain in hip with previous replacement for several months, worsening significantly in the past week.  Refer to orthopedics for further evaluation.  Temporary DMV parking pass and note for work restrictions given.  There are no Patient Instructions on file for this visit.      HARMAN De Los Santos CNP  [facility] [place]  ============================================    Subjective   Johan is a 58 year old, presenting for the following health issues:  Musculoskeletal Problem        1/18/2024     9:54 AM   Additional Questions   Roomed by Francisco EATON   Accompanied by No one         1/18/2024     9:54 AM   Patient Reported Additional Medications   Patient reports taking the following new medications None     History of Present Illness       Reason for visit:  Hip    He eats 2-3 servings of fruits and vegetables daily.He consumes 0 sweetened beverage(s) daily.He exercises with enough effort to increase his heart rate 9 or less minutes per day.  He exercises with enough effort to increase his heart rate 3 or less days per week.   He is taking medications regularly.         Concern - Hip Pain   Onset: been going on for half year but getting worse over the last couple of days  Description: right hip pain, has been replaced at the age of 49, standing up to walk has to take baby steps at first, laying on hip causes pain, has to be laying flat on the back no bending legs.  No pain when standing still.  Pain with movement.  NO pain when lying still recumbent  No recent or remote history of injury to explain symptoms. No redness, swelling or increased warmth.  No fever or chills or recent illness.   Intensity: severe  Progression of Symptoms:  worsening earlier this week and now slightly improved, but still limiting activities of daily living significantly  Accompanying Signs &  "Symptoms: before taking the ibuprofen it was hard to pee due to pain.  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   Therapies tried and outcome: ibuprofen.     No problem-specific Assessment & Plan notes found for this encounter.    Review of Systems   Constitutional: Negative.  Negative for fatigue and fever.   Respiratory: Negative.     Cardiovascular: Negative.    Gastrointestinal: Negative.    Endocrine: Negative.         Home glucoses 160's.  Working on diet.   Genitourinary: Negative.    Musculoskeletal:  Negative for back pain, joint swelling and myalgias.        Hip pain   Skin:  Negative for rash.   Neurological: Negative.       ============================================    Objective    /78 (BP Location: Right arm, Patient Position: Sitting, Cuff Size: Adult Regular)   Pulse 65   Temp 98  F (36.7  C) (Oral)   Resp 18   Ht 1.651 m (5' 5\")   Wt 106.6 kg (235 lb 1.6 oz)   SpO2 96%   BMI 39.12 kg/m    Physical Exam  Constitutional:       Appearance: Normal appearance.   Eyes:      Conjunctiva/sclera: Conjunctivae normal.   Cardiovascular:      Rate and Rhythm: Normal rate and regular rhythm.      Heart sounds: Normal heart sounds.   Pulmonary:      Effort: Pulmonary effort is normal.      Breath sounds: Normal breath sounds.   Musculoskeletal:      Comments: Antalgic gait.  Is unwilling to get on exam table for full exam of hip.  No overt swelling increased warmth of hip.  Films taken.  No acute findings, await radiology read.   Skin:     General: Skin is warm and dry.      Findings: No rash.   Neurological:      Mental Status: He is alert.   Psychiatric:         Mood and Affect: Mood normal.      "     ============================================  HARMAN De Los Santos CNP  Signed Electronically by: HARMAN De Los Santos CNP

## 2024-01-19 ENCOUNTER — PATIENT OUTREACH (OUTPATIENT)
Dept: CARE COORDINATION | Facility: CLINIC | Age: 59
End: 2024-01-19
Payer: COMMERCIAL

## 2024-01-19 NOTE — PROGRESS NOTES
Clinic Care Coordination Contact  Community Health Worker Follow Up    Care Gaps: Did not discuss today (last reviewed 12/14/23)    Health Maintenance Due   Topic Date Due    YEARLY PREVENTIVE VISIT  Never done    DIABETIC FOOT EXAM  Never done    ADVANCE CARE PLANNING  Never done    EYE EXAM  Never done    Pneumococcal Vaccine: Pediatrics (0 to 5 Years) and At-Risk Patients (6 to 64 Years) (1 of 2 - PCV) Never done    COLORECTAL CANCER SCREENING  Never done    HEPATITIS B IMMUNIZATION (3 of 3 - 19+ 3-dose series) 09/21/2016    ZOSTER IMMUNIZATION (1 of 2) Never done    INFLUENZA VACCINE (1) 09/01/2023    COVID-19 Vaccine (4 - 2023-24 season) 09/01/2023    A1C  01/19/2024       Care Gap Goal set: Yes    Care Plan:   Care Plan: Health Maintenance       Problem: Health Maintenance Due or Overdue       Goal: Become up-to-date with health maintenance visit(s)       Start Date: 9/26/2023 Expected End Date: 2/1/2024    This Visit's Progress: 60% Recent Progress: 60%    Priority: High    Note:     Barriers: Pt has multiple comorbidities  Strengths: Lynsey Jacinto, very involved in pt's care, enrolled in CC  Patient expressed understanding of goal: Lynsey expresses understanding  Action steps to achieve this goal:  1. My sister, Lynsey, will schedule OV with Dr. Alvarez to address DM concerns (completed)  2. My sister, Lynsey, and I will address overdue health maintenance with Dr. Alvarez (discussed)  3. My sister, Lynsey, and I will schedule preventative care visit with Dr. Alvarez   4. My sister, Lynsey, and I will schedule eye exam   5. My sister, Lynsey, and I will look over ACP documents sent by CHW on 12/15/23  6. My sister, Lynsey, and I will fill out ACP documents, notarize them, and return to [facility] clinic to be uploaded to [employer].  7. I will stay in touch with CHWShelly, with questions or concerns     Goal updated 12/14/23                                  Intervention and Education during outreach:   Spoke with Lynsey, pt's  sister, this afternoon. CTC signed 5/22/23.  Right leg pain - saw Melba Victor CNP, on 1/18/24. X-rays taken and orthopedic  ordered. Johan has not gotten a call yet to schedule an orthopedic visit.  MTDEMOND Patricia Reza - saw 1/10/24 and will see for f/u visit on 2/14/24. Lynsey states pt was started on Ozempic.  Disability waiver - approved for SMRT. Have not heard back from [facility] worker about specifics of that program.  New apartment - Lynsey states pt is settling well into his new apartment. Through waiver, has gotten help with cleaning x1. Waiting to hear about regularly scheduled cleaning visits.  Lynsey helps pt save money for his rent.  Medication alarmed dose help - Lynsey sets up weekly. Disc alarms if medications not taken at the correct time.  Medical Alert lanyard - Lynsey has set up and will be giving to him this weekend.   Hon[employer] Cho[employer] documents - Lynsey is unsure she has gotten the email. In looking oat her school emails from 12/14/23, she can see the email sent by CHW. Lynsey made copies of the documents at the time of our visit to review at home.   CHW asks if Lynsey has any further concerns or need for resources as this time. Lynsey  states she does not. CHW made sure Lynsey has CHW contact information. Pt will contact CHW with questions or updates before next outreach.     CHW Plan: CHW will follow up with Lynsey in one month.    Shelly Bray  Community Health Worker  [facility]  [phone]

## 2024-01-25 ENCOUNTER — OFFICE VISIT (OUTPATIENT)
Dept: ORTHOPEDICS | Facility: CLINIC | Age: 59
End: 2024-01-25
Payer: COMMERCIAL

## 2024-01-25 ENCOUNTER — OFFICE VISIT (OUTPATIENT)
Dept: ORTHOPEDICS | Facility: CLINIC | Age: 59
End: 2024-01-25
Attending: NURSE PRACTITIONER
Payer: COMMERCIAL

## 2024-01-25 ENCOUNTER — ANCILLARY PROCEDURE (OUTPATIENT)
Dept: GENERAL RADIOLOGY | Facility: CLINIC | Age: 59
End: 2024-01-25
Attending: PHYSICIAN ASSISTANT
Payer: COMMERCIAL

## 2024-01-25 VITALS
SYSTOLIC BLOOD PRESSURE: 124 MMHG | WEIGHT: 235 LBS | BODY MASS INDEX: 39.15 KG/M2 | HEIGHT: 65 IN | DIASTOLIC BLOOD PRESSURE: 79 MMHG

## 2024-01-25 VITALS — WEIGHT: 235 LBS | SYSTOLIC BLOOD PRESSURE: 124 MMHG | DIASTOLIC BLOOD PRESSURE: 79 MMHG | BODY MASS INDEX: 39.11 KG/M2

## 2024-01-25 DIAGNOSIS — M25.551 HIP PAIN, RIGHT: ICD-10-CM

## 2024-01-25 DIAGNOSIS — M70.71 ILIOPSOAS BURSITIS OF RIGHT HIP: Primary | ICD-10-CM

## 2024-01-25 PROCEDURE — 20611 DRAIN/INJ JOINT/BURSA W/US: CPT | Mod: RT | Performed by: STUDENT IN AN ORGANIZED HEALTH CARE EDUCATION/TRAINING PROGRAM

## 2024-01-25 PROCEDURE — 99213 OFFICE O/P EST LOW 20 MIN: CPT | Performed by: PHYSICIAN ASSISTANT

## 2024-01-25 PROCEDURE — 72100 X-RAY EXAM L-S SPINE 2/3 VWS: CPT | Mod: TC | Performed by: RADIOLOGY

## 2024-01-25 PROCEDURE — 99207 PR NO BILLABLE SERVICE THIS VISIT: CPT | Performed by: STUDENT IN AN ORGANIZED HEALTH CARE EDUCATION/TRAINING PROGRAM

## 2024-01-25 RX ORDER — BUPIVACAINE HYDROCHLORIDE 5 MG/ML
2 INJECTION, SOLUTION PERINEURAL
Status: SHIPPED | OUTPATIENT
Start: 2024-01-25

## 2024-01-25 RX ORDER — LIDOCAINE HYDROCHLORIDE 10 MG/ML
3 INJECTION, SOLUTION INFILTRATION; PERINEURAL
Status: SHIPPED | OUTPATIENT
Start: 2024-01-25

## 2024-01-25 RX ORDER — LIDOCAINE HYDROCHLORIDE 10 MG/ML
1 INJECTION, SOLUTION INFILTRATION; PERINEURAL
Status: SHIPPED | OUTPATIENT
Start: 2024-01-25

## 2024-01-25 RX ADMIN — BUPIVACAINE HYDROCHLORIDE 2 ML: 5 INJECTION, SOLUTION PERINEURAL at 10:19

## 2024-01-25 RX ADMIN — LIDOCAINE HYDROCHLORIDE 3 ML: 10 INJECTION, SOLUTION INFILTRATION; PERINEURAL at 10:19

## 2024-01-25 RX ADMIN — LIDOCAINE HYDROCHLORIDE 1 ML: 10 INJECTION, SOLUTION INFILTRATION; PERINEURAL at 10:19

## 2024-01-25 NOTE — PATIENT INSTRUCTIONS
Thank you for choosing [facility]!    DR. HUYNH'S CLINIC LOCATIONS:     [place]  TRIAGE LINE: [phone] [address] APPOINTMENTS: [phone]   [place] [address] RADIOLOGY: [phone]   (Mondays & Tuesdays) HAND THERAPY: [phone]    PHYSICAL THERAPY: [phone]   [place] BILLING QUESTIONS: [phone] [address] FAX: [phone]   [place] [address]    (Thursdays & Fridays)     Post-Injection Discharge Instructions    You may shower, however avoid swimming, tub baths or hot tubs for 24 hours following your procedure  You may have a mild to moderate increase in pain for a few days following the injection.  The lidocaine (local numbing medicine) will wear off in several hours. It usually takes 3-5 days for the steroid medication to start working although it may take up to 14 days for full effect.   You may use ice packs for 10-15 minutes, 3 to 4 times a day at the injection site for comfort if needed  You may use extra strength Tylenol for pain control if necessary   If you were fasting, you may resume your normal diet and medications after the procedure  If you have diabetes, your blood sugar may be higher than normal for 10-14 days following a steroid injection. Contact your doctor who manages your diabetes if your blood sugar is significantly higher than usual    If you experience any of the following, call Sports Medicine @ [phone] or [phone]  -Fever over 100 degrees F  -Swelling, bleeding, redness, drainage, warmth at the injection site  -New or significant worsening pain

## 2024-01-25 NOTE — PROGRESS NOTES
Sports Medicine Procedure Note    Johan was seen today for pain.    Diagnoses and all orders for this visit:    Iliopsoas bursitis of right hip    Hip pain, right  -     Large Joint Injection/Arthocentesis: R iliopsoas bursa      Johan Cole is a/an 58 year old male who is seen for  diagnostic  injection of right iliopsoas bursa.   Last injection: N/A    Preprocedure pain score: 5/10  Postprocedure pain score: 1/10.  Patient also reports that walking as well as getting up from a seated position is easier post-procedure    -Large Joint Injection/Arthocentesis: R iliopsoas bursa    Date/Time: 1/25/2024 10:19 AM    Performed by: Crissy Ragsdale DO  Authorized by: Crissy Ragsdale DO    Indications:  Pain  Needle Size:  22 G  Guidance: ultrasound    Approach:  Lateral  Location:  Hip      Site:  R iliopsoas bursa  Medications:  1 mL lidocaine 1 %; 2 mL BUPivacaine 0.5 %; 3 mL lidocaine 1 %  Outcome:  Tolerated well, no immediate complications  Procedure discussed: discussed risks, benefits, and alternatives    Consent Given by:  Patient  Timeout: timeout called immediately prior to procedure    Prep: patient was prepped and draped in usual sterile fashion     0.5 ml of 8.4% Sodium Bicarbonate solution was used to buffer the local numbing agent for today's injection    1ml of 8.4% Sodium Bicarbonate solution was used to buffer the local numbing agent for today's injection      .Ultrasound was used to ensure safe and accurate needle placement and injection. Ultrasound images of the procedure were permanently stored.        -Post-injection instructions were provided to the patient  -Could consider returning for corticosteroid injection of iliopsoas bursa in the future  - Continue to follow with Cristian Moulton orthopedic PA    Return if symptoms worsen or fail to improve.      Post-Injection Discharge Instructions    You may shower, however avoid swimming, tub baths or hot tubs for 24 hours following your  procedure  You may have a mild to moderate increase in pain for a few days following the injection.  The lidocaine (local numbing medicine) will wear off in several hours. It usually takes 3-5 days for the steroid medication to start working although it may take up to 14 days for full effect.   You may use ice packs for 10-15 minutes, 3 to 4 times a day at the injection site for comfort if needed  You may use extra strength Tylenol for pain control if necessary   If you were fasting, you may resume your normal diet and medications after the procedure  If you have diabetes, your blood sugar may be higher than normal for 10-14 days following a steroid injection. Contact your doctor who manages your diabetes if your blood sugar is significantly higher than usual    If you experience any of the following, call Sports Medicine @ [phone] or [phone]  -Fever over 100 degrees F  -Swelling, bleeding, redness, drainage, warmth at the injection site  -New or significant worsening pain        Dr. Crissy Ragsdale, DO  [facility]  Sports Medicine     -----

## 2024-01-25 NOTE — Clinical Note
1/25/2024         RE: Johan Cole  [address] [place] [id number]        Dear Colleague,    Thank you for referring your patient, Johan Cole, to the [facility] [place]. Please see a copy of my visit note below.    Sports Medicine Procedure Note    Johan was seen today for pain.    Diagnoses and all orders for this visit:    Iliopsoas bursitis of right hip    Hip pain, right  -     Large Joint Injection/Arthocentesis: R hip joint      Johan Cole is a/an 58 year old male who is seen for  diagnostic  injection of right iliopsoas bursa.   Last injection: N/A    Preprocedure pain score: 5/10  Postprocedure pain score: 1/10.  Patient also reports that walking as well as getting up from a seated position is easier post-procedure    -Large Joint Injection/Arthocentesis: R iliopsoas bursa    Date/Time: 1/25/2024 10:19 AM    Performed by: Crissy Ragsdale DO  Authorized by: Crissy Ragsdale DO    Indications:  Pain  Needle Size:  22 G  Guidance: ultrasound    Approach:  Lateral  Location:  Hip      Site:  R iliopsoas bursa  Medications:  1 mL lidocaine 1 %; 2 mL BUPivacaine 0.5 %; 3 mL lidocaine 1 %  Outcome:  Tolerated well, no immediate complications  Procedure discussed: discussed risks, benefits, and alternatives    Consent Given by:  Patient  Timeout: timeout called immediately prior to procedure    Prep: patient was prepped and draped in usual sterile fashion     0.5 ml of 8.4% Sodium Bicarbonate solution was used to buffer the local numbing agent for today's injection    1ml of 8.4% Sodium Bicarbonate solution was used to buffer the local numbing agent for today's injection      .Ultrasound was used to ensure safe and accurate needle placement and injection. Ultrasound images of the procedure were permanently stored.        -Post-injection instructions were provided to the patient  -Could consider returning for corticosteroid injection of  iliopsoas bursa in the future  - Continue to follow with Cristian Moulton orthopedic PA    Return if symptoms worsen or fail to improve.      Post-Injection Discharge Instructions    You may shower, however avoid swimming, tub baths or hot tubs for 24 hours following your procedure  You may have a mild to moderate increase in pain for a few days following the injection.  The lidocaine (local numbing medicine) will wear off in several hours. It usually takes 3-5 days for the steroid medication to start working although it may take up to 14 days for full effect.   You may use ice packs for 10-15 minutes, 3 to 4 times a day at the injection site for comfort if needed  You may use extra strength Tylenol for pain control if necessary   If you were fasting, you may resume your normal diet and medications after the procedure  If you have diabetes, your blood sugar may be higher than normal for 10-14 days following a steroid injection. Contact your doctor who manages your diabetes if your blood sugar is significantly higher than usual    If you experience any of the following, call Sports Medicine @ [phone] or [phone]  -Fever over 100 degrees F  -Swelling, bleeding, redness, drainage, warmth at the injection site  -New or significant worsening pain        Dr. Crissy Ragsdale, DO  [facility]  Sports Medicine     -----         Again, thank you for allowing me to participate in the care of your patient.        Sincerely,        Crissy Ragsdale, DO

## 2024-01-25 NOTE — PROGRESS NOTES
HISTORY OF PRESENT ILLNESS:    Johan Cole is a 58 year old male who is seen in consultation at the request of Dr. Victor for right hip pain. He states he has had a right total hip replacement when he was 48. He states he is not able to sleep with the pain he is experiencing. He says coughing and sneezing aggravates it, sneezing being the worst pain.    Present symptoms:  Symptoms began  1 years(s) ago.  Reports insidious onset without acute precipitating event.  He reports unbearable and miserable sharp, achy pain. Sharp, achy pain that is located from the front/top of the knee to the hip and some of his groin ; radiation Present. Symptoms are positional.  Pain is 10/10 in maximal severity, and 1/10 currently sitting at rest.  Symptoms are generally worse with/at getting in forklift, walking, getting in the car, standing up from a chair and going up stairs, getting into side position in bed, (with clarification seems active hip flexion); better with/at rest, ibuprofen.  Overall the patient feels the condition is definitely worsening. Other treatment so far has consisted of Ibuprofen and Rest with minimal relief.  Associated symptoms: Limp.  He has not had swelling, locking, or catching; denies buckling episodes or instability sensation.  He has had history of similar or related problems. He states this pain is similar to what he was experiencing before his hip replacement.  Note:  pt has difficult time getting into supine position and cannot lay there long.  Orthopedic PMH: Right total hip replacement 2014, left knee arthroscopy   Past Medical History:   Diagnosis Date    High blood pressure        Past Surgical History:   Procedure Laterality Date    AS KNEE SCOPE,MED/LAT MENISCUS REPAIR      INGUINAL HERNIA REPAIR      THYROIDECTOMY   06/01/2005    TOTAL HIP ARTHROPLASTY  05/01/2015    Deosn't remember side       Family History   Problem Relation Age of Onset    Rheumatoid Arthritis Mother     Diabetes Father      Heart Disease Father        Social History     Socioeconomic History    Marital status: Single     Spouse name: Not on file    Number of children: Not on file    Years of education: Not on file    Highest education level: Not on file   Occupational History    Not on file   Tobacco Use    Smoking status: Never    Smokeless tobacco: Current     Types: Chew   Vaping Use    Vaping Use: Never used   Substance and Sexual Activity    Alcohol use: Yes     Comment: Rare    Drug use: Never    Sexual activity: Not Currently   Other Topics Concern    Not on file   Social History Narrative    Not on file     Social Determinants of Health     Financial Resource Strain: Low Risk  (1/18/2024)    Financial Resource Strain     Within the past 12 months, have you or your family members you live with been unable to get utilities (heat, electricity) when it was really needed?: No   Food Insecurity: Low Risk  (1/18/2024)    Food Insecurity     Within the past 12 months, did you worry that your food would run out before you got money to buy more?: No     Within the past 12 months, did the food you bought just not last and you didn t have money to get more?: No   Transportation Needs: Low Risk  (1/18/2024)    Transportation Needs     Within the past 12 months, has lack of transportation kept you from medical appointments, getting your medicines, non-medical meetings or appointments, work, or from getting things that you need?: No   Physical Activity: Not on file   Stress: Not on file   Social Connections: Not on file   Interpersonal Safety: Low Risk  (1/18/2024)    Interpersonal Safety     Do you feel physically and emotionally safe where you currently live?: Yes     Within the past 12 months, have you been hit, slapped, kicked or otherwise physically hurt by someone?: No     Within the past 12 months, have you been humiliated or emotionally abused in other ways by your partner or ex-partner?: No   Housing Stability: Low Risk   (1/18/2024)    Housing Stability     Do you have housing? : Yes     Are you worried about losing your housing?: No       Current Outpatient Medications   Medication Sig Dispense Refill    amLODIPine (NORVASC) 5 MG tablet TAKE 1 TABLET BY MOUTH AT BEDTIME 90 tablet 0    aspirin 81 MG EC tablet Take 1 tablet (81 mg) by mouth daily 90 tablet 3    atorvastatin (LIPITOR) 40 MG tablet Take 1 tablet (40 mg) by mouth daily 90 tablet 3    blood glucose (NO BRAND SPECIFIED) test strip Use to test blood sugar 1 times daily or as directed. To accompany: Blood Glucose Monitor Brands: per insurance. 100 strip 6    blood glucose monitoring (NO BRAND SPECIFIED) meter device kit Use to test blood sugar 1 times daily or as directed. Preferred blood glucose meter OR supplies to accompany: Blood Glucose Monitor Brands: per insurance. 1 kit 0    Blood Glucose Monitoring Suppl (ACCU-CHEK GUIDE) w/Device KIT USE TO TEST BLOOD SUGAR 1 TIME DAILY OR AS DIRECED      chlorthalidone (HYGROTON) 25 MG tablet Take 1 tablet (25 mg) by mouth daily 90 tablet 1    levothyroxine (SYNTHROID/LEVOTHROID) 100 MCG tablet TAKE 1 TABLET BY MOUTH EVERY DAY 90 tablet 1    lisinopril (ZESTRIL) 40 MG tablet Take 1 tablet (40 mg) by mouth daily 90 tablet 1    metFORMIN (GLUCOPHAGE XR) 500 MG 24 hr tablet Take 2 tablets (1,000 mg) by mouth 2 times daily (with meals) 360 tablet 1    semaglutide (OZEMPIC) 2 MG/3ML pen Inject 0.25 mg Subcutaneous every 7 days For 4 weeks then increase to 0.5 mg once weekly 3 mL 1    thin (NO BRAND SPECIFIED) lancets Use with lanceting device. To accompany: Blood Glucose Monitor Brands: per insurance. 100 each 6       No Known Allergies    Patient's past medical, surgical, social and family histories are reviewed today.  Medical history includes: DONA, Morbid obesity, Learning disability, hypothyroidism, HTN, DM 2.  REVIEW OF SYSTEMS: No recent changes,.  CONSTITUTIONAL:  NEGATIVE for fever, chills, change in  weight  INTEGUMENTARY/SKIN:  NEGATIVE for worrisome rashes, moles or lesions  EYES:  NEGATIVE for vision changes or irritation  ENT/MOUTH:  NEGATIVE for ear, mouth and throat problems  RESP:  NEGATIVE for significant cough or SOB  BREAST:  NEGATIVE for masses, tenderness or discharge  CV:  NEGATIVE for chest pain, palpitations or peripheral edema  GI:  NEGATIVE for nausea, abdominal pain, heartburn, or change in bowel habits  :  Negative   MUSCULOSKELETAL:  See HPI above  NEURO:  NEGATIVE for weakness, dizziness or paresthesias  ENDOCRINE:  NEGATIVE for temperature intolerance, skin/hair changes  HEME/ALLERGY/IMMUNE:  NEGATIVE for bleeding problems  PSYCHIATRIC:  NEGATIVE for changes in mood or affect      PHYSICAL EXAM:  /79   Wt 106.6 kg (235 lb)   BMI 39.11 kg/m    Body mass index is 39.11 kg/m .   GENERAL APPEARANCE: healthy, well nourished. Obese, trunkal with pannus.   NEURO: He is alert and oriented x 3, mentation intact and speech normal  POSTURE: Stands with normal weight bearing line.  PSYCH:  mentation appears normal and affect normal/bright    MUSCULOSKELETAL: Examination of Right hip.  GROSS OBSERVATION:  pt with limp on right.  PALPATION: No groin mass or tenderness.  No pain over greater trochanter, quad muscle, IT band, Glutes.  Does have sharp pain over lumber vertebrae.  ROM:  Right:  passive flexion IR and ER witout pain and symmetric.  Pain at crease with extension.  AROM, flexion with pain, extension with pain  LIGAMENTOUS:  intact  STRENGTH:  decreased seated hip flexion and knee extension.  SPECIAL TESTS:  SLR positive for pain at hip, but passive SLR negative for radicular symptoms, even with ankle dorsiflexion.  SKIN: , no lesions, erythema noted bilaterally.  VASCULATURE:  Good capillary refill bilaterally.  SENSATION: light touch intact to bilateral lower extremities Otherwise no gross deficits noted.   COORDINATION:  Able to move joint within above stated ROM with good  control.    Imaging Interpretation:   xr ap pelvis with right hip from 1/18/2024.     Images demonstrate the postoperative changes of a right total hip arthroplasty with well seated components.  No excessive acetabular version.  No signs of hardware failure or wear.  No bony pathology or reaction.  Metal clips present laterally.    ASSESSMENT:  1. Hip pain, right        Low suspicion for [place] pathology, but rather illiopsoas tendonitis.  Does not appear to have acetabular version issue but could be irritating.      PLAN:    1.  Refer to Sports med for US guided Illiopoas tendon/bursa local anesthetic diagnostic injection.  Provider informed me he had near 100% improvement.  I observed hip walking without limp.  2.  May consider Cortisone injection if not longer lasting improvement.    Follow up in clinic if symptoms return.   May consider PT.    A total of 20 minutes spent with patient on care and care coordinating activities.      Cristian Moulton PA-C  [facility] - Surgery

## 2024-01-25 NOTE — Clinical Note
1/25/2024         RE: Johan Cole  [address] [place] [id number]        Dear Colleague,    Thank you for referring your patient, Johan Cole, to the [facility] [place]. Please see a copy of my visit note below.    HISTORY OF PRESENT ILLNESS:    Johan Cole is a 58 year old male who is seen in consultation at the request of Dr. Victor for right hip pain. He states he has had a right total hip replacement when he was 48. He states he is not able to sleep with the pain he is experiencing. He says coughing and sneezing aggravates it, sneezing being the worst pain.    Present symptoms:  Symptoms began  1 years(s) ago.  Reports insidious onset without acute precipitating event.  He reports unbearable and miserable sharp, achy pain. Sharp, achy pain that is located from the front/top of the knee to the hip and some of his groin ; radiation Present.  Symptoms are { }. Pain is 10/10 in maximal severity, and 0/10 currently.  Symptoms are generally worse with/at getting in forklift, walking, getting in the car, standing up from a chair; better with/at rest, ibuprofen.  Overall the patient feels the condition is definitely worsening. Other treatment so far has consisted of Ibuprofen and Rest with minimal relief.  Associated symptoms: has not had swelling, locking, or catching; denies buckling episodes or instability sensation.  He has had history of similar or related problems. He states this pain is similar to what he was experiencing before his hip replacement.  Orthopedic PMH: Right total hip replacement, left knee arthroscopy   No past medical history on file.    Past Surgical History:   Procedure Laterality Date    AS KNEE SCOPE,MED/LAT MENISCUS REPAIR      INGUINAL HERNIA REPAIR      THYROIDECTOMY   06/01/2005    TOTAL HIP ARTHROPLASTY  05/01/2015    Deosn't remember side       Family History   Problem Relation Age of Onset    Diabetes Father        Social History      Socioeconomic History    Marital status: Single     Spouse name: Not on file    Number of children: Not on file    Years of education: Not on file    Highest education level: Not on file   Occupational History    Not on file   Tobacco Use    Smoking status: Never    Smokeless tobacco: Current     Types: Chew   Vaping Use    Vaping Use: Never used   Substance and Sexual Activity    Alcohol use: Yes     Comment: Rare    Drug use: Never    Sexual activity: Not Currently   Other Topics Concern    Not on file   Social History Narrative    Not on file     Social Determinants of Health     Financial Resource Strain: Low Risk  (1/18/2024)    Financial Resource Strain     Within the past 12 months, have you or your family members you live with been unable to get utilities (heat, electricity) when it was really needed?: No   Food Insecurity: Low Risk  (1/18/2024)    Food Insecurity     Within the past 12 months, did you worry that your food would run out before you got money to buy more?: No     Within the past 12 months, did the food you bought just not last and you didn t have money to get more?: No   Transportation Needs: Low Risk  (1/18/2024)    Transportation Needs     Within the past 12 months, has lack of transportation kept you from medical appointments, getting your medicines, non-medical meetings or appointments, work, or from getting things that you need?: No   Physical Activity: Not on file   Stress: Not on file   Social Connections: Not on file   Interpersonal Safety: Low Risk  (1/18/2024)    Interpersonal Safety     Do you feel physically and emotionally safe where you currently live?: Yes     Within the past 12 months, have you been hit, slapped, kicked or otherwise physically hurt by someone?: No     Within the past 12 months, have you been humiliated or emotionally abused in other ways by your partner or ex-partner?: No   Housing Stability: Low Risk  (1/18/2024)    Housing Stability     Do you have  "housing? : Yes     Are you worried about losing your housing?: No       Current Outpatient Medications   Medication Sig Dispense Refill    amLODIPine (NORVASC) 5 MG tablet TAKE 1 TABLET BY MOUTH AT BEDTIME 90 tablet 0    aspirin 81 MG EC tablet Take 1 tablet (81 mg) by mouth daily 90 tablet 3    atorvastatin (LIPITOR) 40 MG tablet Take 1 tablet (40 mg) by mouth daily 90 tablet 3    blood glucose (NO BRAND SPECIFIED) test strip Use to test blood sugar 1 times daily or as directed. To accompany: Blood Glucose Monitor Brands: per insurance. 100 strip 6    blood glucose monitoring (NO BRAND SPECIFIED) meter device kit Use to test blood sugar 1 times daily or as directed. Preferred blood glucose meter OR supplies to accompany: Blood Glucose Monitor Brands: per insurance. 1 kit 0    Blood Glucose Monitoring Suppl (ACCU-CHEK GUIDE) w/Device KIT USE TO TEST BLOOD SUGAR 1 TIME DAILY OR AS DIRECED      chlorthalidone (HYGROTON) 25 MG tablet Take 1 tablet (25 mg) by mouth daily 90 tablet 1    levothyroxine (SYNTHROID/LEVOTHROID) 100 MCG tablet TAKE 1 TABLET BY MOUTH EVERY DAY 90 tablet 1    lisinopril (ZESTRIL) 40 MG tablet Take 1 tablet (40 mg) by mouth daily 90 tablet 1    metFORMIN (GLUCOPHAGE XR) 500 MG 24 hr tablet Take 2 tablets (1,000 mg) by mouth 2 times daily (with meals) 360 tablet 1    semaglutide (OZEMPIC) 2 MG/3ML pen Inject 0.25 mg Subcutaneous every 7 days For 4 weeks then increase to 0.5 mg once weekly 3 mL 1    thin (NO BRAND SPECIFIED) lancets Use with lanceting device. To accompany: Blood Glucose Monitor Brands: per insurance. 100 each 6       No Known Allergies    Patient's past medical, surgical, social and family histories are reviewed today.  { :744298::\"There are no significant contributory medical issues.\"}  REVIEW OF SYSTEMS:  CONSTITUTIONAL:  NEGATIVE for fever, chills, change in weight  INTEGUMENTARY/SKIN:  NEGATIVE for worrisome rashes, moles or lesions  EYES:  NEGATIVE for vision changes or " irritation  ENT/MOUTH:  NEGATIVE for ear, mouth and throat problems  RESP:  NEGATIVE for significant cough or SOB  BREAST:  NEGATIVE for masses, tenderness or discharge  CV:  NEGATIVE for chest pain, palpitations or peripheral edema  GI:  NEGATIVE for nausea, abdominal pain, heartburn, or change in bowel habits  :  Negative   MUSCULOSKELETAL:  See HPI above  NEURO:  NEGATIVE for weakness, dizziness or paresthesias  ENDOCRINE:  NEGATIVE for temperature intolerance, skin/hair changes  HEME/ALLERGY/IMMUNE:  NEGATIVE for bleeding problems  PSYCHIATRIC:  NEGATIVE for changes in mood or affect      PHYSICAL EXAM:  There were no vitals taken for this visit.  There is no height or weight on file to calculate BMI.   GENERAL APPEARANCE: healthy, well nourished. ***   NEURO: *** alert and oriented x 3, mentation intact and speech normal  POSTURE: Stands *** normal weight bearing line.  PSYCH:  mentation appears normal and affect normal/bright    MUSCULOSKELETAL: Examination of ***.  GROSS OBSERVATION:  ***  PALPATION: ***  ROM:  Right:  ***.  Left: ***  LIGAMENTOUS:  ***  STRENGTH:  ***  SPECIAL TESTS:  ***    CONTRALATERAL JOINT:  no overlying skin change, observable deformity, or effusion; range of motion as noted above; strength and function are within normal limits; no obvious ligamentous instability. ***    SKIN: ***, no lesions, erythema noted bilaterally.  VASCULATURE:  Good capillary refill bilaterally.  SENSATION: *** Otherwise no gross deficits noted.   COORDINATION:  Able to move joint within above stated ROM with good control.    Imaging Interpretation:   ***     ASSESSMENT:  No diagnosis found.  ***  PLAN:    1.  ***  2.  ***    Follow up in clinic in *** weeks or ***.    Cristian Moulton PA-C  [facility] - Surgery      HISTORY OF PRESENT ILLNESS:    Johan Cole is a 58 year old male who is seen in consultation at the request of Dr. Victor for right hip pain. He states he has had a  right total hip replacement when he was 48. He states he is not able to sleep with the pain he is experiencing. He says coughing and sneezing aggravates it, sneezing being the worst pain.    Present symptoms:  Symptoms began  1 years(s) ago.  Reports insidious onset without acute precipitating event.  He reports unbearable and miserable sharp, achy pain. Sharp, achy pain that is located from the front/top of the knee to the hip and some of his groin ; radiation Present. Symptoms are positional.  Pain is 10/10 in maximal severity, and 1/10 currently sitting at rest.  Symptoms are generally worse with/at getting in forklift, walking, getting in the car, standing up from a chair and going up stairs, getting into side position in bed, (with clarification seems active hip flexion); better with/at rest, ibuprofen.  Overall the patient feels the condition is definitely worsening. Other treatment so far has consisted of Ibuprofen and Rest with minimal relief.  Associated symptoms: Limp.  He has not had swelling, locking, or catching; denies buckling episodes or instability sensation.  He has had history of similar or related problems. He states this pain is similar to what he was experiencing before his hip replacement.  Note:  pt has difficult time getting into supine position and cannot lay there long.  Orthopedic PMH: Right total hip replacement 2014, left knee arthroscopy   Past Medical History:   Diagnosis Date     High blood pressure        Past Surgical History:   Procedure Laterality Date     AS KNEE SCOPE,MED/LAT MENISCUS REPAIR       INGUINAL HERNIA REPAIR       THYROIDECTOMY   06/01/2005     TOTAL HIP ARTHROPLASTY  05/01/2015    Deosn't remember side       Family History   Problem Relation Age of Onset     Rheumatoid Arthritis Mother      Diabetes Father      Heart Disease Father        Social History     Socioeconomic History     Marital status: Single     Spouse name: Not on file     Number of children: Not on  file     Years of education: Not on file     Highest education level: Not on file   Occupational History     Not on file   Tobacco Use     Smoking status: Never     Smokeless tobacco: Current     Types: Chew   Vaping Use     Vaping Use: Never used   Substance and Sexual Activity     Alcohol use: Yes     Comment: Rare     Drug use: Never     Sexual activity: Not Currently   Other Topics Concern     Not on file   Social History Narrative     Not on file     Social Determinants of Health     Financial Resource Strain: Low Risk  (1/18/2024)    Financial Resource Strain      Within the past 12 months, have you or your family members you live with been unable to get utilities (heat, electricity) when it was really needed?: No   Food Insecurity: Low Risk  (1/18/2024)    Food Insecurity      Within the past 12 months, did you worry that your food would run out before you got money to buy more?: No      Within the past 12 months, did the food you bought just not last and you didn t have money to get more?: No   Transportation Needs: Low Risk  (1/18/2024)    Transportation Needs      Within the past 12 months, has lack of transportation kept you from medical appointments, getting your medicines, non-medical meetings or appointments, work, or from getting things that you need?: No   Physical Activity: Not on file   Stress: Not on file   Social Connections: Not on file   Interpersonal Safety: Low Risk  (1/18/2024)    Interpersonal Safety      Do you feel physically and emotionally safe where you currently live?: Yes      Within the past 12 months, have you been hit, slapped, kicked or otherwise physically hurt by someone?: No      Within the past 12 months, have you been humiliated or emotionally abused in other ways by your partner or ex-partner?: No   Housing Stability: Low Risk  (1/18/2024)    Housing Stability      Do you have housing? : Yes      Are you worried about losing your housing?: No       Current Outpatient  Medications   Medication Sig Dispense Refill     amLODIPine (NORVASC) 5 MG tablet TAKE 1 TABLET BY MOUTH AT BEDTIME 90 tablet 0     aspirin 81 MG EC tablet Take 1 tablet (81 mg) by mouth daily 90 tablet 3     atorvastatin (LIPITOR) 40 MG tablet Take 1 tablet (40 mg) by mouth daily 90 tablet 3     blood glucose (NO BRAND SPECIFIED) test strip Use to test blood sugar 1 times daily or as directed. To accompany: Blood Glucose Monitor Brands: per insurance. 100 strip 6     blood glucose monitoring (NO BRAND SPECIFIED) meter device kit Use to test blood sugar 1 times daily or as directed. Preferred blood glucose meter OR supplies to accompany: Blood Glucose Monitor Brands: per insurance. 1 kit 0     Blood Glucose Monitoring Suppl (ACCU-CHEK GUIDE) w/Device KIT USE TO TEST BLOOD SUGAR 1 TIME DAILY OR AS DIRECED       chlorthalidone (HYGROTON) 25 MG tablet Take 1 tablet (25 mg) by mouth daily 90 tablet 1     levothyroxine (SYNTHROID/LEVOTHROID) 100 MCG tablet TAKE 1 TABLET BY MOUTH EVERY DAY 90 tablet 1     lisinopril (ZESTRIL) 40 MG tablet Take 1 tablet (40 mg) by mouth daily 90 tablet 1     metFORMIN (GLUCOPHAGE XR) 500 MG 24 hr tablet Take 2 tablets (1,000 mg) by mouth 2 times daily (with meals) 360 tablet 1     semaglutide (OZEMPIC) 2 MG/3ML pen Inject 0.25 mg Subcutaneous every 7 days For 4 weeks then increase to 0.5 mg once weekly 3 mL 1     thin (NO BRAND SPECIFIED) lancets Use with lanceting device. To accompany: Blood Glucose Monitor Brands: per insurance. 100 each 6       No Known Allergies    Patient's past medical, surgical, social and family histories are reviewed today.  Medical history includes: DONA, Morbid obesity, Learning disability, hypothyroidism, HTN, DM 2.  REVIEW OF SYSTEMS: No recent changes,.  CONSTITUTIONAL:  NEGATIVE for fever, chills, change in weight  INTEGUMENTARY/SKIN:  NEGATIVE for worrisome rashes, moles or lesions  EYES:  NEGATIVE for vision changes or irritation  ENT/MOUTH:  NEGATIVE for  ear, mouth and throat problems  RESP:  NEGATIVE for significant cough or SOB  BREAST:  NEGATIVE for masses, tenderness or discharge  CV:  NEGATIVE for chest pain, palpitations or peripheral edema  GI:  NEGATIVE for nausea, abdominal pain, heartburn, or change in bowel habits  :  Negative   MUSCULOSKELETAL:  See HPI above  NEURO:  NEGATIVE for weakness, dizziness or paresthesias  ENDOCRINE:  NEGATIVE for temperature intolerance, skin/hair changes  HEME/ALLERGY/IMMUNE:  NEGATIVE for bleeding problems  PSYCHIATRIC:  NEGATIVE for changes in mood or affect      PHYSICAL EXAM:  /79   Wt 106.6 kg (235 lb)   BMI 39.11 kg/m    Body mass index is 39.11 kg/m .   GENERAL APPEARANCE: healthy, well nourished. Obese, trunkal with pannus.   NEURO: He is alert and oriented x 3, mentation intact and speech normal  POSTURE: Stands with normal weight bearing line.  PSYCH:  mentation appears normal and affect normal/bright    MUSCULOSKELETAL: Examination of Right hip.  GROSS OBSERVATION:  pt with limp on right.  PALPATION: No groin mass or tenderness.  No pain over greater trochanter, quad muscle, IT band, Glutes.  Does have sharp pain over lumber vertebrae.  ROM:  Right:  passive flexion IR and ER witout pain and symmetric.  Pain at crease with extension.  AROM, flexion with pain, extension with pain  LIGAMENTOUS:  intact  STRENGTH:  decreased seated hip flexion and knee extension.  SPECIAL TESTS:  SLR positive for pain at hip, but passive SLR negative for radicular symptoms, even with ankle dorsiflexion.  SKIN: , no lesions, erythema noted bilaterally.  VASCULATURE:  Good capillary refill bilaterally.  SENSATION: light touch intact to bilateral lower extremities Otherwise no gross deficits noted.   COORDINATION:  Able to move joint within above stated ROM with good control.    Imaging Interpretation:   xr ap pelvis with right hip from 1/18/2024.     Images demonstrate the postoperative changes of a right total hip  arthroplasty with well seated components.  No excessive acetabular version.  No signs of hardware failure or wear.  No bony pathology or reaction.  Metal clips present laterally.    ASSESSMENT:  1. Hip pain, right        Low suspicion for [place] pathology, but rather illiopsoas tendonitis.  Does not appear to have acetabular version issue but could be irritating.      PLAN:    1.  Refer to Sports med for US guided Illiopoas tendon/bursa local anesthetic diagnostic injection.  Provider informed me he had near 100% improvement.  I observed hip walking without limp.  2.  May consider Cortisone injection if not longer lasting improvement.    Follow up in clinic in *** weeks or ***.    Cristian Moulton PA-C  [facility] - Surgery        Again, thank you for allowing me to participate in the care of your patient.        Sincerely,        Cristian Moulton PA-C

## 2024-02-05 ENCOUNTER — PATIENT OUTREACH (OUTPATIENT)
Dept: CARE COORDINATION | Facility: CLINIC | Age: 59
End: 2024-02-05
Payer: COMMERCIAL

## 2024-02-05 ASSESSMENT — ACTIVITIES OF DAILY LIVING (ADL): DEPENDENT_IADLS:: INDEPENDENT

## 2024-02-05 NOTE — PROGRESS NOTES
Clinic Care Coordination Contact  Care Coordination Clinician Chart Review    Situation: Patient chart reviewed by Care Coordinator.       Background: Care Coordination Program started: 5/22/2023. Initial assessment completed and patient-centered care plan(s) were developed with participation from patient. Lead CC handed patient off to CHW for continued outreaches.       Assessment: Per chart review, patient outreach completed by CC CHW on 1/19/2024.  Patient is actively working to accomplish goal(s). Patient's goal(s) appropriate and relevant at this time. Patient is due for updated Plan of Care.  Assessments will be completed annually or as needed/with change of patient status.      Care Plan: Health Maintenance       Problem: Health Maintenance Due or Overdue       Goal: Become up-to-date with health maintenance visit(s)       Start Date: 9/26/2023 Expected End Date: 4/1/2024    Recent Progress: 60%    Priority: High    Note:     Barriers: Pt has multiple comorbidities  Strengths: Lynsey Jacinto, very involved in pt's care, enrolled in CC  Patient expressed understanding of goal: Lynsey expresses understanding  Action steps to achieve this goal:  1. My sister, Lynsey, will schedule OV with Dr. Alvarez to address DM concerns (completed)  2. My sister, Lynsey, and I will address overdue health maintenance with Dr. Alvarez (discussed)  3. My sister, Lynsey, and I will schedule preventative care visit with Dr. Alvarez   4. My sister, Lynsey, and I will schedule eye exam   5. My sister, Lynsey, and I will look over ACP documents sent by CHW on 12/15/23  6. My sister, Lynsey, and I will fill out ACP documents, notarize them, and return to [facility] clinic to be uploaded to [employer].  7. I will stay in touch with CHWShelly, with questions or concerns     Goal updated 12/14/23                                     Plan/Recommendations: The patient will continue working with Care Coordination to achieve goal(s) as above. CHW will continue  outreaches at minimum every 30 days and will involve Lead CC as needed or if patient is ready to move to Maintenance. Lead CC will continue to monitor CHW outreaches and patient's progress to goal(s) every 6 weeks.     Plan of Care updated and sent to patient: Yes, via [facility]  Clinic Care Coordinator  [facility]  [phone]  [email or web address]

## 2024-02-05 NOTE — LETTER
[facility] CARE COORDINATION  [address]  [facility] [address]    February 5, 2024        Johan Cole  [address]  [facility] [address]          Dear Patricia Prado is an updated Patient Centered Plan of Care for your continued enrollment in Care Coordination. Please let us know if you have additional questions, concerns, or goals that we can assist with.    Sincerely,    Devan Keenan [facility]  Clinic Care Coordinator  [facility]  [phone]  [email or web address]  Patient Centered Plan of Care  About Me:        Patient Name:  Johan Cole    YOB: 1965  Age:         58 year old   Otto MRN:    [id number] Telephone Information:  Home Phone [phone]   Mobile [phone]   Mobile [phone]       Address:  [address]  [facility] [address] Email address:  [email or web address]      Emergency Contact(s)    Name Relationship Lgl Grd Work Phone Home Phone Mobile Phone   1. THEO COLE Father   [phone]    2. CARLITO COLE Sister    [phone]           Primary language:  English     needed? No   [employer]:  [phone] op. 1  Other communication barriers:None    Preferred Method of Communication:     Current living arrangement: I live in a private home with family    Mobility Status/ Medical Equipment: Independent        Health Maintenance  Health Maintenance Reviewed: Due/Overdue   Health Maintenance Due   Topic Date Due    YEARLY PREVENTIVE VISIT  Never done    DIABETIC FOOT EXAM  Never done    ADVANCE CARE PLANNING  Never done    EYE EXAM  Never done    Pneumococcal Vaccine: Pediatrics (0 to 5 Years) and At-Risk Patients (6 to 64 Years) (1 of 2 - PCV) Never done    COLORECTAL CANCER SCREENING  Never done    HEPATITIS B IMMUNIZATION (3 of 3 - 19+ 3-dose series) 09/21/2016    ZOSTER  IMMUNIZATION (1 of 2) Never done    INFLUENZA VACCINE (1) 09/01/2023    COVID-19 Vaccine (4 - 2023-24 season) 09/01/2023    A1C  01/19/2024           My Access Plan  Medical Emergency 911   Primary Clinic Line [facility] - [phone]   24 Hour Appointment Line [phone] or  [phone]) (toll-free)   24 Hour Nurse Line [phone] (toll-free)   Preferred Urgent Care No data recorded   Preferred Hospital [facility]  [phone]     Preferred Pharmacy [id number] [place], [place] - [address]     Behavioral Health Crisis Line The National Suicide Prevention Lifeline at 1-675.508.7208 or Text/Call 218           My Care Team Members  Patient Care Team         Relationship Specialty Notifications Start End    Ap Alvarez MD PCP - General Family Medicine  4/7/22     Phone: [phone] Fax: [phone] [address] [address] [facility] [address]    Ap Alvarez MD Assigned PCP   3/17/22     Phone: [phone] Fax: [phone]         [address] [address] [facility] [address]    Devan Keenan LSW Lead Care Coordinator Primary Care - CC Admissions 6/9/23     Phone: [phone]         Shelly Bray CHW Community Health Worker  Admissions 7/12/23     Phone: [phone]         Primo Mathias PA-C Assigned Musculoskeletal Provider   9/23/23     Phone: [phone] Fax: [phone]         [address] [place] [address]    Eulalia Oliver RPH Pharmacist Pharmacist  11/27/23     Phone: [phone] Fax: [phone] [address] [address]    Eulalia Oliver RPH Assigned MTM Pharmacist   12/2/23     Phone: [phone] Fax: [phone] [address] [address]                My Care Plans  Self Management and Treatment Plan    Care Plan  Care Plan: Health Maintenance       Problem: Health Maintenance Due or Overdue       Goal: Become up-to-date with health  maintenance visit(s)       Start Date: 9/26/2023 Expected End Date: 4/1/2024    Recent Progress: 60%    Priority: High    Note:     Barriers: Pt has multiple comorbidities  Strengths: Lynsey Jacinto, very involved in pt's care, enrolled in CC  Patient expressed understanding of goal: Lynsey expresses understanding  Action steps to achieve this goal:  1. My sister, Lynsey, will schedule OV with Dr. Alvarez to address DM concerns (completed)  2. My sister, Lynsey, and I will address overdue health maintenance with Dr. Alvarez (discussed)  3. My sister, Lynsey, and I will schedule preventative care visit with Dr. Alvarez   4. My sister, Lynsey, and I will schedule eye exam   5. My sister, Lynsey, and I will look over ACP documents sent by CHW on 12/15/23  6. My sisterLynsey, and I will fill out ACP documents, notarize them, and return to [facility] clinic to be uploaded to [employer].  7. I will stay in touch with CHWShelly, with questions or concerns     Goal updated 12/14/23                                    Advance Care Plans/Directives:   Advanced Care Plan/Directives on file:   No    Discussed with patient/caregiver(s): No data recorded           My Medical and Care Information  Problem List   Patient Active Problem List   Diagnosis    Post-traumatic osteoarthritis of left knee    DONA (obstructive sleep apnea)    S/P complete thyroidectomy    Benign essential hypertension    Morbid obesity (H)    Hyperlipidemia LDL goal <100    Type 2 diabetes mellitus with hyperglycemia, with long-term current use of insulin (H)    Klinefelter's syndrome    Hypogonadism in male    Learning disability    Microalbuminuria    Acquired hypothyroidism      Current Medications and Allergies:   No Known Allergies  Current Outpatient Medications   Medication    amLODIPine (NORVASC) 5 MG tablet    aspirin 81 MG EC tablet    atorvastatin (LIPITOR) 40 MG tablet    blood glucose (NO BRAND SPECIFIED) test strip    blood glucose monitoring (NO BRAND SPECIFIED)  meter device kit    Blood Glucose Monitoring Suppl (ACCU-CHEK GUIDE) w/Device KIT    chlorthalidone (HYGROTON) 25 MG tablet    levothyroxine (SYNTHROID/LEVOTHROID) 100 MCG tablet    lisinopril (ZESTRIL) 40 MG tablet    metFORMIN (GLUCOPHAGE XR) 500 MG 24 hr tablet    semaglutide (OZEMPIC) 2 MG/3ML pen    thin (NO BRAND SPECIFIED) lancets     Current Facility-Administered Medications   Medication    2.0 mL bupivacaine (MARCAINE) 0.5% injection (50 mL vial)    lidocaine 1 % injection 1 mL    lidocaine 1 % injection 3 mL         Care Coordination Start Date: 5/22/2023   Frequency of Care Coordination: monthly, more frequently as needed     Form Last Updated: 02/05/2024

## 2024-02-09 DIAGNOSIS — I10 BENIGN ESSENTIAL HYPERTENSION: ICD-10-CM

## 2024-02-09 RX ORDER — AMLODIPINE BESYLATE 5 MG/1
5 TABLET ORAL AT BEDTIME
Qty: 90 TABLET | Refills: 0 | Status: SHIPPED | OUTPATIENT
Start: 2024-02-09 | End: 2024-03-07

## 2024-02-14 ENCOUNTER — LAB (OUTPATIENT)
Dept: LAB | Facility: CLINIC | Age: 59
End: 2024-02-14
Payer: COMMERCIAL

## 2024-02-14 ENCOUNTER — OFFICE VISIT (OUTPATIENT)
Dept: PHARMACY | Facility: CLINIC | Age: 59
End: 2024-02-14
Payer: COMMERCIAL

## 2024-02-14 VITALS
HEART RATE: 74 BPM | WEIGHT: 228.5 LBS | SYSTOLIC BLOOD PRESSURE: 100 MMHG | DIASTOLIC BLOOD PRESSURE: 64 MMHG | OXYGEN SATURATION: 96 % | BODY MASS INDEX: 38.02 KG/M2

## 2024-02-14 DIAGNOSIS — E11.65 TYPE 2 DIABETES MELLITUS WITH HYPERGLYCEMIA, WITH LONG-TERM CURRENT USE OF INSULIN (H): Primary | ICD-10-CM

## 2024-02-14 DIAGNOSIS — Z79.4 TYPE 2 DIABETES MELLITUS WITH HYPERGLYCEMIA, WITH LONG-TERM CURRENT USE OF INSULIN (H): ICD-10-CM

## 2024-02-14 DIAGNOSIS — E11.65 TYPE 2 DIABETES MELLITUS WITH HYPERGLYCEMIA, WITH LONG-TERM CURRENT USE OF INSULIN (H): ICD-10-CM

## 2024-02-14 DIAGNOSIS — I10 BENIGN ESSENTIAL HYPERTENSION: ICD-10-CM

## 2024-02-14 DIAGNOSIS — E78.5 HYPERLIPIDEMIA LDL GOAL <100: ICD-10-CM

## 2024-02-14 DIAGNOSIS — Z79.4 TYPE 2 DIABETES MELLITUS WITH HYPERGLYCEMIA, WITH LONG-TERM CURRENT USE OF INSULIN (H): Primary | ICD-10-CM

## 2024-02-14 LAB
ANION GAP SERPL CALCULATED.3IONS-SCNC: 11 MMOL/L (ref 7–15)
BUN SERPL-MCNC: 19.4 MG/DL (ref 6–20)
CALCIUM SERPL-MCNC: 9.3 MG/DL (ref 8.6–10)
CHLORIDE SERPL-SCNC: 103 MMOL/L (ref 98–107)
CREAT SERPL-MCNC: 1.29 MG/DL (ref 0.67–1.17)
DEPRECATED HCO3 PLAS-SCNC: 29 MMOL/L (ref 22–29)
EGFRCR SERPLBLD CKD-EPI 2021: 65 ML/MIN/1.73M2
GLUCOSE SERPL-MCNC: 162 MG/DL (ref 70–99)
HBA1C MFR BLD: 7.6 % (ref 0–5.6)
POTASSIUM SERPL-SCNC: 3.5 MMOL/L (ref 3.4–5.3)
SODIUM SERPL-SCNC: 143 MMOL/L (ref 135–145)

## 2024-02-14 PROCEDURE — 80048 BASIC METABOLIC PNL TOTAL CA: CPT

## 2024-02-14 PROCEDURE — 99606 MTMS BY PHARM EST 15 MIN: CPT | Performed by: PHARMACIST

## 2024-02-14 PROCEDURE — 99607 MTMS BY PHARM ADDL 15 MIN: CPT | Performed by: PHARMACIST

## 2024-02-14 PROCEDURE — 83036 HEMOGLOBIN GLYCOSYLATED A1C: CPT

## 2024-02-14 PROCEDURE — 36415 COLL VENOUS BLD VENIPUNCTURE: CPT

## 2024-02-14 NOTE — PATIENT INSTRUCTIONS
"Recommendations from today's MT visit:                                                       Call [phone] to schedule diabetes education visit to learn more about what foods increase blood sugars.  Your cholesterol has improved a lot since starting the atorvastatin so we will continue current dose.    Recent Labs   Lab Test 01/10/24  0850 05/22/23  0917   CHOL 163 279*   HDL 33* 36*   LDL 91 200*   TRIG 194* 213*     Call sports medicines to let them know that the injections didn't improve pain and you need to schedule follow-up.  [phone]  I put in a referral for diabetic eye exam, please schedule  Increase Ozempic to 0.5 mg once weekly starting on 2/19/24  Watch for symptoms of low blood sugar (sweaty, shaky, lightheaded, dizzy, hungry) chew 4 glucose tablets. This should bring your blood sugar up if its low.  Great job on weight loss  Wt Readings from Last 5 Encounters:   02/14/24 228 lb 8 oz (103.6 kg)   01/25/24 235 lb (106.6 kg)   01/25/24 235 lb (106.6 kg)   01/18/24 235 lb 1.6 oz (106.6 kg)   01/10/24 234 lb 4.8 oz (106.3 kg)     Follow-up: Return in 26 days (on 3/11/2024).    It was great speaking with you today.  I value your experience and would be very thankful for your time in providing feedback in our clinic survey. In the next few days, you may receive an email or text message from [employer] with a link to a survey related to your  clinical pharmacist.\"     To schedule another MT appointment, please call the clinic directly or you may call the MTM scheduling line at [phone] or toll-free at [phone].     My Clinical Pharmacist's contact information:                                                      Please feel free to contact me with any questions or concerns you have.      Patricia Oliver, PharmD  Medication Therapy Management Pharmacist  [facility] - Monday and Wednesday 7:30 - 4:00       Medication List            Accurate as of February 14, 2024  8:56 AM. If you have " any questions, ask your nurse or doctor.                START taking these medications          Morning Afternoon Evening Bedtime    glucose 4 g chewable tablet  Also known as: BD GLUCOSE  Chew 4 tablets if blood sugar drops below 70, wait 15 minutes and recheck blood sugar. If blood sugar is still below 70, then chew 4 more tablets. Repeat until blood sugar is above 70.  Started by: Eulalia Oliver [facility]  Notes to patient: For low blood sugar less than 70 or have symptoms with no glucose meter      As needed               CHANGE how you take these medications          Morning Afternoon Evening Bedtime    semaglutide 2 MG/3ML pen  Also known as: OZEMPIC  Inject 0.5 mg Subcutaneous every 7 days  What changed:   how much to take  additional instructions  Changed by: Eulalia Oliver RPH  Notes to patient: For lowering blood sugars        Once weekly on Mondays               CONTINUE taking these medications          Morning Afternoon Evening Bedtime    amLODIPine 5 MG tablet  Also known as: NORVASC  TAKE 1 TABLET BY MOUTH AT BEDTIME  Notes to patient: For lowering blood sugars               aspirin 81 MG EC tablet  Take 1 tablet (81 mg) by mouth daily  Notes to patient: To prevent heart attack and stroke               atorvastatin 40 MG tablet  Also known as: LIPITOR  Take 1 tablet (40 mg) by mouth daily  Notes to patient: To lower bad cholesterol               * blood glucose monitoring meter device kit  Also known as: NO BRAND SPECIFIED  Use to test blood sugar 1 times daily or as directed. Preferred blood glucose meter OR supplies to accompany: Blood Glucose Monitor Brands: per insurance.      Check once daily - alternate between fasting and before bed         * Accu-Chek Guide w/Device Kit  USE TO TEST BLOOD SUGAR 1 TIME DAILY OR AS DIRECED      Check once daily - alternate between fasting and before bed         blood glucose test strip  Also known as: NO BRAND SPECIFIED  Use to test blood sugar 1 times daily or  as directed. To accompany: Blood Glucose Monitor Brands: per insurance.      Check once daily - alternate between fasting and before bed         chlorthalidone 25 MG tablet  Also known as: HYGROTON  Take 1 tablet (25 mg) by mouth daily  Notes to patient: To lower blood pressure                levothyroxine 100 MCG tablet  Also known as: SYNTHROID/LEVOTHROID  TAKE 1 TABLET BY MOUTH EVERY DAY  Notes to patient: Thyroid supplement               lisinopril 40 MG tablet  Also known as: ZESTRIL  Take 1 tablet (40 mg) by mouth daily  Notes to patient: To lower blood pressure and kidney protection               metFORMIN 500 MG 24 hr tablet  Also known as: GLUCOPHAGE XR  Take 2 tablets (1,000 mg) by mouth 2 times daily (with meals)  Notes to patient: For diabetes to lower blood sugar        2 tablets       2 tablets      thin lancets  Also known as: NO BRAND SPECIFIED  Use with lanceting device. To accompany: Blood Glucose Monitor Brands: per insurance.      Check once daily - alternate between fasting and before bed              * This list has 2 medication(s) that are the same as other medications prescribed for you. Read the directions carefully, and ask your doctor or other care provider to review them with you.                   Where to Get Your Medications        These medications were sent to [id number] - [place], [place] - [address] [place] [address] [address]  [address] [place] [id number]      Phone: [phone]   glucose 4 g chewable tablet  semaglutide 2 MG/3ML pen

## 2024-02-14 NOTE — PROGRESS NOTES
I contacted the pharmacy to make sure that they have all the prescriptions and they said they did not receive the Ozempic prescription so I resent it.    Patricia Oliver, PharmD

## 2024-02-14 NOTE — PROGRESS NOTES
Medication Therapy Management (MTM) Encounter    ASSESSMENT:                            Medication Adherence/Access: No issues identified    Diabetes:   Patient is not meeting A1c goal of < 7% but much improved.  Self monitoring of blood glucose is at goal of fasting  mg/dL.  Microalbumin is not at goal <30 mg/g. Taking lisinopril at max dose.  Patient would benefit from increasing Ozempic.   Encouraged him to schedule a diabetes education visit and provided him the number to call.  Educated him on how to treat hypoglycemia and sent prescription for glucose tablets to treat if needed.    Hypertension:   Patient is meeting blood pressure goal of < 140/90mmHg. Asked him to continue current medications and contact us if he starts to have hypotensive symptoms.     Hyperlipidemia:   Improve, stable.    PLAN:                            Call [phone] to schedule diabetes education visit to learn more about what foods increase blood sugars.  Your cholesterol has improved a lot since starting the atorvastatin.  Recent Labs   Lab Test 01/10/24  0850 05/22/23  0917   CHOL 163 279*   HDL 33* 36*   LDL 91 200*   TRIG 194* 213*     Call sports [facility] to let them know that the injections didn't improve pain and you need to schedule follow-up.  [phone]  I put in a referral for diabetic eye exam, please schedule  Increase Ozempic to 0.5 mg once weekly starting on 2/19/24  Watch for symptoms of low blood sugar (sweaty, shaky, lightheaded, dizzy, hungry) chew 4 glucose tablets. This should bring your blood sugar up if its low.    Follow-up: Return in 26 days (on 3/11/2024).    SUBJECTIVE/OBJECTIVE:                          Johan Cole is a 58 year old male coming in for a follow-up visit from 1/10/24.       Reason for visit: follow-up diabetes and lipids.    Allergies/ADRs: Reviewed in chart  Past Medical History: Reviewed in chart  Tobacco: He reports that he has never smoked. His smokeless tobacco use includes  chew. Patient has no interest in quitting  Alcohol: not currently using    Medication Adherence/Access: no issues reported and using pillbox which is working great.    Diabetes   Metformin ER 1000 mg twice daily  Ozempic 0.25 mg once weekly Mondays   Lisinopril 40 mg once daily  Aspirin 81mg daily  Patient is not experiencing side effects other than maybe a slight headache at times but tolerable.    Blood sugar monitoring: using glucose monitor   AM fasting: can only recall one of 130  Current diabetes symptoms: none and reports not low blood sugars, lowest may have been 120    Diet/Exercise: Reports he is eating less since starting the Ozempic. He has been eating more protein and yogurt. He doesn't eat a lot of bread. He has cut back a little on pasta. He does not drink regular pop or juice. He does have an energy drink once a month. He has not met with diabetes education but would be willing.        Eye exam in the last 12 months? No    Foot exam: due  Urine Albumin:   Lab Results   Component Value Date    UMALCR 30.00 (H) 05/22/2023      Lab Results   Component Value Date    A1C 7.6 (H) 02/14/2024    A1C 9.5 (H) 10/19/2023     Hypertension   Amlodipine 5 mg once daily in the evening - has been out since Sunday due to pharmacy filling issues.  Lisinopril 40 mg once daily   Chlorthalidone 25 mg daily  Patient reports no current medication side effects  Patient does not self-monitor blood pressure.         BP Readings from Last 3 Encounters:   02/14/24 100/64   01/25/24 124/79   01/25/24 124/79     Pulse Readings from Last 3 Encounters:   02/14/24 74   01/18/24 65   01/10/24 78     Hyperlipidemia   Atorvastatin 40 mg daily    Patient reports no significant myalgias or other side effects.    The 10-year ASCVD risk score (Sushil SHEIKH, et al., 2019) is: 17.9%    Values used to calculate the score:      Age: 58 years      Sex: Male      Is Non- : No      Diabetic: Yes      Tobacco smoker: No       Systolic Blood Pressure: 106 mmHg      Is BP treated: Yes      HDL Cholesterol: 36 mg/dL      Total Cholesterol: 279 mg/dL       Recent Labs   Lab Test 01/10/24  0850 05/22/23  0917   CHOL 163 279*   HDL 33* 36*   LDL 91 200*   TRIG 194* 213*       Today's Vitals: /64   Pulse 74   Wt 228 lb 8 oz (103.6 kg)   SpO2 96%   BMI 38.02 kg/m    ----------------    I spent 30 minutes with this patient today. All changes were made via collaborative practice agreement with Ap Alvarez MD. A copy of the visit note was provided to the patient's provider(s).    A summary of these recommendations was given to the patient.    Patricia Oliver, PharmD  Medication Therapy Management Pharmacist     Medication Therapy Recommendations  Type 2 diabetes mellitus with hyperglycemia, with long-term current use of insulin (H)    Current Medication: semaglutide (OZEMPIC) 2 MG/3ML pen   Rationale: Preventive therapy - Needs additional medication therapy - Indication   Recommendation: Start Medication - glucose 4 g chewable tablet   Status: Accepted per CPA          Current Medication: semaglutide (OZEMPIC) 2 MG/3ML pen (Discontinued)   Rationale: Dose too low - Dosage too low - Effectiveness   Recommendation: Increase Dose - Ozempic (0.25 or 0.5 MG/DOSE) 2 MG/1.5ML Sopn   Status: Accepted per CPA

## 2024-02-21 DIAGNOSIS — I10 BENIGN ESSENTIAL HYPERTENSION: Primary | ICD-10-CM

## 2024-02-22 ENCOUNTER — TELEPHONE (OUTPATIENT)
Dept: FAMILY MEDICINE | Facility: CLINIC | Age: 59
End: 2024-02-22
Payer: COMMERCIAL

## 2024-02-22 NOTE — TELEPHONE ENCOUNTER
Informed patient of provider message. Patient expressed understanding and will contact the clinic with further concerns.    Zara Ramos RN

## 2024-02-22 NOTE — TELEPHONE ENCOUNTER
Patricia is out of the office today, I'm assisting in covering her messages.  I agree with your recommendations.  I would also advise that symptoms should improve with time, although may occur again with next dose (hopefully more mild at that time).    Destiny Jolly, PharmD, [facility]  Medication Therapy Management Provider  [phone]

## 2024-02-22 NOTE — TELEPHONE ENCOUNTER
Patricia,    Pt calls with update since starting Ozempic. Pt states that since Monday he has been vomiting. Pt reports emesis episodes mostly at night after returning from work. He also notes having diarrhea and acid reflux. He confirms taking his medication as prescribed. He denies concerns for dehydration, stating he is tolerating oral fluids and urinating regularly. Pt denies signs of low BG.     Advised pt to call back with new or worsening symptoms including signs of dehydration and blood in emesis. Pt verbalized understanding and agrees with plan.     Pt inquires what he should do, and requests call back. Please review and advise. Thanks!  Odell Storm RN on 2/22/2024 at 2:48 PM

## 2024-02-28 ENCOUNTER — PATIENT OUTREACH (OUTPATIENT)
Dept: CARE COORDINATION | Facility: CLINIC | Age: 59
End: 2024-02-28
Payer: COMMERCIAL

## 2024-02-28 NOTE — LETTER
DEMOND [facility] CARE COORDINATION  [address]  [facility] [address]    February 28, 2024    Johan Cole  [address]     [place] [address]      Dear Johan and Lynsey,    I have been attempting to reach you since our last contact. I would like to continue to work with you and provide any additional support you may need on achieving your health care related goals. I would appreciate if you would give me a call at [phone] to let me know if you would like to continue working together. I know that there are many things that can affect our ability to communicate and I hope we can continue to work together.    All of us at the [facility] are invested in your health and are here to assist you in meeting your goals.       Sincerely,    Shelly Bray  Community Health Worker  [facility]

## 2024-02-28 NOTE — LETTER
[facility]MON[facility] CARE COORDINATION  [address]  [facility] [address]    February 28, 2024    Johan Cole  [address]     [place] [address]      Dear Johan and Lynsey,    I have been attempting to reach you since our last contact. I would like to continue to work with you and provide any additional support you may need on achieving your health care related goals. I would appreciate if you would give me a call at [phone] to let me know if you would like to continue working together. I know that there are many things that can affect our ability to communicate and I hope we can continue to work together.    All of us at the [facility] are invested in your health and are here to assist you in meeting your goals.       Sincerely,    Shelly Bray  Community Health Worker  [facility]

## 2024-02-28 NOTE — LETTER
[facility]MOND [facility] CARE COORDINATION  [address]  [facility] [address]    February 28, 2024    Johan Cole  [address]     [place] [address]      Dear Johan and Lynsey,    I have been attempting to reach you since our last contact. I would like to continue to work with you and provide any additional support you may need on achieving your health care related goals. I would appreciate if you would give me a call at [phone] to let me know if you would like to continue working together. I know that there are many things that can affect our ability to communicate and I hope we can continue to work together.    All of us at the [facility] are invested in your health and are here to assist you in meeting your goals.       Sincerely,    Shelly Bray  Community Health Worker  [facility]

## 2024-02-28 NOTE — PROGRESS NOTES
Clinic Care Coordination Contact  [facility]/Voicemail    Clinical Data: Care Coordinator Outreach    Outreach Documentation Number of Outreach Attempt   2/28/2024   3:51 PM 1       Left message on patient's sister's, Lynsey's, voicemail with call back information and requested return call. CTC signed 5/22/23.    Plan: Care Coordinator will send unable to contact letter with care coordinator contact information via mail. Care Coordinator will try to reach Lynsey again in 10 business days.    Shelly Bray  Community Health Worker  [facility]  [phone]

## 2024-03-02 DIAGNOSIS — I10 BENIGN ESSENTIAL HYPERTENSION: ICD-10-CM

## 2024-03-04 RX ORDER — CHLORTHALIDONE 25 MG/1
25 TABLET ORAL DAILY
Qty: 30 TABLET | Refills: 0 | Status: SHIPPED | OUTPATIENT
Start: 2024-03-04 | End: 2024-03-07

## 2024-03-05 ENCOUNTER — TELEPHONE (OUTPATIENT)
Dept: FAMILY MEDICINE | Facility: CLINIC | Age: 59
End: 2024-03-05

## 2024-03-05 NOTE — CONFIDENTIAL NOTE
Patient Quality Outreach    Patient is due for the following:   Colon Cancer Screening  Physical Preventive Adult Physical    Next Steps:   Schedule a Adult Preventative    Type of outreach:    Sent letter.      Questions for provider review:    None           Sahil Quinn MA

## 2024-03-05 NOTE — LETTER
[facility]  [address] [facility] [address] [phone]       March 5, 2024    Johan Cole  [address] [place] [address]   [address] [place] [id number]    Dear Johan,    We care about your health and have reviewed your health plan and are making recommendations based on this review, to optimize your health.    You are in particular need of attention regarding:  -Colon Cancer Screening  -Wellness (Physical) Visit     We are recommending that you:  -schedule a WELLNESS (Physical) APPOINTMENT with me.   I will check fasting labs the same day - nothing to eat except water and meds for 8-10 hours prior.    -schedule a COLONOSCOPY to look for colon cancer (due every 10 years or 5 years in higher risk situations.)        Colon cancer is now the second leading cause of cancer-related deaths in the [place] for both men and women and there are over 130,000 new cases and 50,000 deaths per year from colon cancer.  Colonoscopies can prevent 90-95% of these deaths.  Problem lesions can be removed before they ever become cancer.  This test is not only looking for cancer, but also getting rid of precancerious lesions.    If you are under/uninsured, we recommend you contact the [employer] program. [employer] is a free colorectal cancer screening program that provides colonoscopies for eligible under/uninsured [place] men and women. If you are interested in receiving a free colonoscopy, please call [employer] at [phone] (mention code [employer]) to see if you re eligible.      If you do not wish to do a colonoscopy or cannot afford to do one, at this time, there is another option. It is called a FIT test or Fecal Immunochemical Occult Blood Test (take home stool sample kit).  It does not replace the colonoscopy for colorectal cancer screening, but it can detect hidden bleeding in the lower colon.  It does need to be repeated every year and if a positive result is obtained, you would be  referred for a colonoscopy.          If you have completed either one of these tests at another facility, please call with the details of when and where the tests were done and if they were normal or not. Or have the records sent to our clinic so that we can best coordinate your care.    In addition, here is a list of due or overdue Health Maintenance reminders.    Health Maintenance Due   Topic Date Due    Yearly Preventive Visit  Never done    Diabetic Foot Exam  Never done    Discuss Advance Care Planning  Never done    Eye Exam  Never done    Pneumococcal Vaccine (1 of 2 - PCV) Never done    Colorectal Cancer Screening  Never done    Hepatitis B Vaccine (3 of 3 - 19+ 3-dose series) 09/21/2016    Zoster (Shingles) Vaccine (1 of 2) Never done    Flu Vaccine (1) 09/01/2023    COVID-19 Vaccine (4 - 2023-24 season) 09/01/2023       To address the above recommendations, we encourage you to contact us at [phone], via [employer] or by contacting Central Scheduling toll free at [phone] 24 hours a day. They will assist you with finding the most convenient time and location.    Thank you for trusting [facility] and we appreciate the opportunity to serve you.  We look forward to supporting your healthcare needs in the future.    Healthy Regards,    Your [facility] Team

## 2024-03-05 NOTE — LETTER
[facility]  [address] [facility] [address] [phone]       March 19, 2024    Johan Cole  [address] [place] [address] [address] [place] [address]    Dear Johan,    We care about your health and have reviewed your health plan and are making recommendations based on this review, to optimize your health.    You are in particular need of attention regarding:  -Colon Cancer Screening  -Wellness (Physical) Visit     We are recommending that you:  -schedule a WELLNESS (Physical) APPOINTMENT with me.   I will check fasting labs the same day - nothing to eat except water and meds for 8-10 hours prior.      -schedule a COLONOSCOPY to look for colon cancer (due every 10 years or 5 years in higher risk situations.)        Colon cancer is now the second leading cause of cancer-related deaths in the [place] for both men and women and there are over 130,000 new cases and 50,000 deaths per year from colon cancer.  Colonoscopies can prevent 90-95% of these deaths.  Problem lesions can be removed before they ever become cancer.  This test is not only looking for cancer, but also getting rid of precancerious lesions.    If you are under/uninsured, we recommend you contact the [employer] program. [employer] is a free colorectal cancer screening program that provides colonoscopies for eligible under/uninsured [place] men and women. If you are interested in receiving a free colonoscopy, please call [employer] at [phone] (mention code [employer]) to see if you re eligible.      If you do not wish to do a colonoscopy or cannot afford to do one, at this time, there is another option. It is called a FIT test or Fecal Immunochemical Occult Blood Test (take home stool sample kit).  It does not replace the colonoscopy for colorectal cancer screening, but it can detect hidden bleeding in the lower colon.  It does need to be repeated every year and if a positive result is obtained, you would  be referred for a colonoscopy.          If you have completed either one of these tests at another facility, please call with the details of when and where the tests were done and if they were normal or not. Or have the records sent to our clinic so that we can best coordinate your care.    In addition, here is a list of due or overdue Health Maintenance reminders.    Health Maintenance Due   Topic Date Due    Yearly Preventive Visit  Never done    Discuss Advance Care Planning  Never done    Pneumococcal Vaccine (1 of 2 - PCV) Never done    Colorectal Cancer Screening  Never done    Hepatitis B Vaccine (3 of 3 - 19+ 3-dose series) 09/21/2016    Zoster (Shingles) Vaccine (1 of 2) Never done    Flu Vaccine (1) 09/01/2023    COVID-19 Vaccine (4 - 2023-24 season) 09/01/2023       To address the above recommendations, we encourage you to contact us at [phone], via [employer] or by contacting Central Scheduling toll free at [phone] 24 hours a day. They will assist you with finding the most convenient time and location.    Thank you for trusting [facility] and we appreciate the opportunity to serve you.  We look forward to supporting your healthcare needs in the future.    Healthy Regards,    Your [facility] Team

## 2024-03-06 ENCOUNTER — OFFICE VISIT (OUTPATIENT)
Dept: OPTOMETRY | Facility: CLINIC | Age: 59
End: 2024-03-06
Payer: COMMERCIAL

## 2024-03-06 DIAGNOSIS — H52.13 MYOPIA OF BOTH EYES WITH ASTIGMATISM: ICD-10-CM

## 2024-03-06 DIAGNOSIS — E11.65 TYPE 2 DIABETES MELLITUS WITH HYPERGLYCEMIA, WITH LONG-TERM CURRENT USE OF INSULIN (H): ICD-10-CM

## 2024-03-06 DIAGNOSIS — H40.033 NARROW ANGLE GLAUCOMA SUSPECT OF BOTH EYES: ICD-10-CM

## 2024-03-06 DIAGNOSIS — E11.9 TYPE 2 DIABETES MELLITUS WITHOUT RETINOPATHY (H): Primary | ICD-10-CM

## 2024-03-06 DIAGNOSIS — H52.4 PRESBYOPIA: ICD-10-CM

## 2024-03-06 DIAGNOSIS — Z79.4 TYPE 2 DIABETES MELLITUS WITH HYPERGLYCEMIA, WITH LONG-TERM CURRENT USE OF INSULIN (H): ICD-10-CM

## 2024-03-06 DIAGNOSIS — H52.203 MYOPIA OF BOTH EYES WITH ASTIGMATISM: ICD-10-CM

## 2024-03-06 PROCEDURE — 92015 DETERMINE REFRACTIVE STATE: CPT | Performed by: OPTOMETRIST

## 2024-03-06 PROCEDURE — 92004 COMPRE OPH EXAM NEW PT 1/>: CPT | Performed by: OPTOMETRIST

## 2024-03-06 ASSESSMENT — CONF VISUAL FIELD
OD_INFERIOR_TEMPORAL_RESTRICTION: 0
OS_SUPERIOR_TEMPORAL_RESTRICTION: 0
OS_NORMAL: 1
METHOD: COUNTING FINGERS
OD_INFERIOR_NASAL_RESTRICTION: 0
OD_NORMAL: 1
OS_SUPERIOR_NASAL_RESTRICTION: 0
OS_INFERIOR_NASAL_RESTRICTION: 0
OD_SUPERIOR_TEMPORAL_RESTRICTION: 0
OS_INFERIOR_TEMPORAL_RESTRICTION: 0
OD_SUPERIOR_NASAL_RESTRICTION: 0

## 2024-03-06 ASSESSMENT — REFRACTION_MANIFEST
OS_SPHERE: -0.50
OS_SPHERE: -0.25
OS_AXIS: 142
OD_CYLINDER: +1.75
OD_CYLINDER: +1.75
OD_AXIS: 024
OS_CYLINDER: +2.00
OD_ADD: +2.50
OS_CYLINDER: +1.75
OD_AXIS: 001
OD_SPHERE: +0.50
OS_AXIS: 145
OD_SPHERE: PLANO
OS_ADD: +2.50
METHOD_AUTOREFRACTION: 1

## 2024-03-06 ASSESSMENT — VISUAL ACUITY
OS_SC: 20/70
OS_CC: 20/30
METHOD: SNELLEN - LINEAR
OD_CC: 20/30
OD_CC: 20/20
OD_CC+: -2
CORRECTION_TYPE: GLASSES
OD_SC: 20/70
OS_CC: 20/25

## 2024-03-06 ASSESSMENT — KERATOMETRY
OD_AXISANGLE2_DEGREES: 128
OS_K2POWER_DIOPTERS: 45
OD_K2POWER_DIOPTERS: 43.75
OS_K1POWER_DIOPTERS: 43.75
OD_K1POWER_DIOPTERS: 43
OS_AXISANGLE_DEGREES: 89
OS_AXISANGLE2_DEGREES: 179
OD_AXISANGLE_DEGREES: 38

## 2024-03-06 ASSESSMENT — REFRACTION_WEARINGRX
OS_CYLINDER: +1.75
OS_ADD: +2.50
OS_SPHERE: -0.25
OD_CYLINDER: +1.75
OD_ADD: +2.50
SPECS_TYPE: BIFOCAL
OS_AXIS: 158
OD_AXIS: 025
OD_SPHERE: +0.25

## 2024-03-06 ASSESSMENT — CUP TO DISC RATIO
OS_RATIO: 0.5
OD_RATIO: 0.45

## 2024-03-06 ASSESSMENT — SLIT LAMP EXAM - LIDS
COMMENTS: NORMAL
COMMENTS: NORMAL

## 2024-03-06 ASSESSMENT — TONOMETRY
OS_IOP_MMHG: 16
IOP_METHOD: APPLANATION
OD_IOP_MMHG: 16

## 2024-03-06 ASSESSMENT — EXTERNAL EXAM - RIGHT EYE: OD_EXAM: NORMAL

## 2024-03-06 ASSESSMENT — EXTERNAL EXAM - LEFT EYE: OS_EXAM: NORMAL

## 2024-03-06 NOTE — PATIENT INSTRUCTIONS
Patient Education   Diabetes weakens the blood vessels all over the body, including the eyes. Damage to the blood vessels in the eyes can cause swelling or bleeding into part of the eye (called the retina). This is called diabetic retinopathy (SILVIO-tin--pu-thee). If not treated, this disease can cause vision loss or blindness.   Symptoms may include blurred or distorted vision, but many people have no symptoms. It's important to see your eye doctor regularly to check for problems.   Early treatment and good control can help protect your vision. Here are the things you can do to help prevent vision loss:      1. Keep your blood sugar levels under tight control.      2. Bring high blood pressure under control.      3. No smoking.      4. Have yearly dilated eye exams.  No retinopathy     Mild prescription change w/ subjective improvement    Consider progressive addition lens / no line bifocal   Or lowering bifocal  Narrow angles makes you more at risk for glaucoma , check yearly , your pressure is good now  Signs of angle closure are sudden pain in the eye, redness, blurred vision, halos around lights, severe headache or nausea and vomiting.  This would be an emergent visit and you would need to see an ophthalmologist right away

## 2024-03-06 NOTE — LETTER
3/6/2024         RE: Johan Cole  [address]  [facility] [address]        Dear Colleague,    Thank you for referring your patient, Johan Cole, to the [facility]. Please see a copy of my visit note below.    Chief Complaint   Patient presents with     Diabetic Eye Exam       Lab Results   Component Value Date    A1C 7.6 02/14/2024    A1C 9.5 10/19/2023    A1C 10.6 08/24/2023    A1C 7.4 05/22/2023            Last Eye Exam: 1 year ago   Dilated Previously: Yes, side effects of dilation explained today    What are you currently using to see?  glasses    Distance Vision Acuity: Noticed gradual change in both eyes in glasses - feels like bifocal is too high making adjusting hard     Near Vision Acuity: Not satisfied in glasses     Eye Comfort: good  Do you use eye drops? : No      Cary Carrera - Optometric Assistant      Medical, surgical and family histories reviewed and updated 3/6/2024.     No fohx glaucoma   DONA   Narrow angles denies symptoms of angle closure     OBJECTIVE: See Ophthalmology exam    ASSESSMENT:    ICD-10-CM    1. Type 2 diabetes mellitus without retinopathy (H)  E11.9       2. Type 2 diabetes mellitus with hyperglycemia, with long-term current use of insulin (H)  E11.65 Adult Eye  Referral    Z79.4 EYE EXAM (SIMPLE-NONBILLABLE)      3. Myopia of both eyes with astigmatism  H52.13 REFRACTION    H52.203       4. Presbyopia  H52.4 REFRACTION      5. 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    eMlissa Jacobs OD          Again, thank you for allowing me to participate in the care of your patient.        Sincerely,        Melissa Jacobs, OD

## 2024-03-06 NOTE — PROGRESS NOTES
Chief Complaint   Patient presents with    Diabetic Eye Exam       Lab Results   Component Value Date    A1C 7.6 02/14/2024    A1C 9.5 10/19/2023    A1C 10.6 08/24/2023    A1C 7.4 05/22/2023            Last Eye Exam: 1 year ago   Dilated Previously: Yes, side effects of dilation explained today    What are you currently using to see?  glasses    Distance Vision Acuity: Noticed gradual change in both eyes in glasses - feels like bifocal is too high making adjusting hard     Near Vision Acuity: Not satisfied in glasses     Eye Comfort: good  Do you use eye drops? : No      Cary Carrera - Optometric Assistant      Medical, surgical and family histories reviewed and updated 3/6/2024.     No fohx glaucoma   DONA   Narrow angles denies symptoms of angle closure     OBJECTIVE: See Ophthalmology exam    ASSESSMENT:    ICD-10-CM    1. Type 2 diabetes mellitus without retinopathy (H)  E11.9       2. Type 2 diabetes mellitus with hyperglycemia, with long-term current use of insulin (H)  E11.65 Adult Eye  Referral    Z79.4 EYE EXAM (SIMPLE-NONBILLABLE)      3. Myopia of both eyes with astigmatism  H52.13 REFRACTION    H52.203       4. Presbyopia  H52.4 REFRACTION      5. 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    Melissa Jacobs OD

## 2024-03-07 ENCOUNTER — OFFICE VISIT (OUTPATIENT)
Dept: FAMILY MEDICINE | Facility: CLINIC | Age: 59
End: 2024-03-07
Payer: COMMERCIAL

## 2024-03-07 VITALS
RESPIRATION RATE: 20 BRPM | HEART RATE: 82 BPM | TEMPERATURE: 98.8 F | WEIGHT: 218 LBS | HEIGHT: 65 IN | DIASTOLIC BLOOD PRESSURE: 78 MMHG | SYSTOLIC BLOOD PRESSURE: 112 MMHG | OXYGEN SATURATION: 97 % | BODY MASS INDEX: 36.32 KG/M2

## 2024-03-07 DIAGNOSIS — R29.898 RIGHT LEG WEAKNESS: ICD-10-CM

## 2024-03-07 DIAGNOSIS — I10 BENIGN ESSENTIAL HYPERTENSION: ICD-10-CM

## 2024-03-07 DIAGNOSIS — E78.5 HYPERLIPIDEMIA LDL GOAL <100: ICD-10-CM

## 2024-03-07 DIAGNOSIS — Z79.4 TYPE 2 DIABETES MELLITUS WITH HYPERGLYCEMIA, WITH LONG-TERM CURRENT USE OF INSULIN (H): Primary | ICD-10-CM

## 2024-03-07 DIAGNOSIS — E11.65 TYPE 2 DIABETES MELLITUS WITH HYPERGLYCEMIA, WITH LONG-TERM CURRENT USE OF INSULIN (H): Primary | ICD-10-CM

## 2024-03-07 DIAGNOSIS — E89.0 S/P COMPLETE THYROIDECTOMY: ICD-10-CM

## 2024-03-07 DIAGNOSIS — Z12.11 SCREEN FOR COLON CANCER: ICD-10-CM

## 2024-03-07 DIAGNOSIS — E11.69 TYPE 2 DIABETES MELLITUS WITH OTHER SPECIFIED COMPLICATION, UNSPECIFIED WHETHER LONG TERM INSULIN USE (H): ICD-10-CM

## 2024-03-07 LAB
HOLD SPECIMEN: NORMAL
HOLD SPECIMEN: NORMAL

## 2024-03-07 PROCEDURE — 99207 PR FOOT EXAM NO CHARGE: CPT | Performed by: FAMILY MEDICINE

## 2024-03-07 PROCEDURE — 36415 COLL VENOUS BLD VENIPUNCTURE: CPT | Performed by: FAMILY MEDICINE

## 2024-03-07 PROCEDURE — 80048 BASIC METABOLIC PNL TOTAL CA: CPT | Performed by: FAMILY MEDICINE

## 2024-03-07 PROCEDURE — 99214 OFFICE O/P EST MOD 30 MIN: CPT | Performed by: FAMILY MEDICINE

## 2024-03-07 RX ORDER — LISINOPRIL 40 MG/1
40 TABLET ORAL DAILY
Qty: 90 TABLET | Refills: 1 | Status: SHIPPED | OUTPATIENT
Start: 2024-03-07

## 2024-03-07 RX ORDER — METFORMIN HCL 500 MG
1000 TABLET, EXTENDED RELEASE 24 HR ORAL 2 TIMES DAILY WITH MEALS
Qty: 360 TABLET | Refills: 1 | Status: CANCELLED | OUTPATIENT
Start: 2024-03-07

## 2024-03-07 RX ORDER — ASPIRIN 81 MG/1
81 TABLET ORAL DAILY
Qty: 90 TABLET | Refills: 3 | Status: SHIPPED | OUTPATIENT
Start: 2024-03-07

## 2024-03-07 RX ORDER — LEVOTHYROXINE SODIUM 100 UG/1
100 TABLET ORAL DAILY
Qty: 90 TABLET | Refills: 1 | Status: SHIPPED | OUTPATIENT
Start: 2024-03-07

## 2024-03-07 RX ORDER — ATORVASTATIN CALCIUM 40 MG/1
40 TABLET, FILM COATED ORAL DAILY
Qty: 90 TABLET | Refills: 3 | Status: SHIPPED | OUTPATIENT
Start: 2024-03-07

## 2024-03-07 RX ORDER — CHLORTHALIDONE 25 MG/1
25 TABLET ORAL DAILY
Qty: 30 TABLET | Refills: 0 | Status: SHIPPED | OUTPATIENT
Start: 2024-03-07 | End: 2024-04-05

## 2024-03-07 ASSESSMENT — PAIN SCALES - GENERAL: PAINLEVEL: NO PAIN (0)

## 2024-03-07 NOTE — PROGRESS NOTES
Assessment and Plan    (E11.65,  Z79.4) Type 2 diabetes mellitus with hyperglycemia, with long-term current use of insulin (H)  (primary encounter diagnosis)  Comment: A1c trending down, but persistent vomting from Ozempic - will swithc to a different GLP1  Plan: aspirin 81 MG EC tablet, metFORMIN (GLUCOPHAGE)        500 MG tablet, FOOT EXAM            (I10) Benign essential hypertension  Comment: well controlled  Plan: chlorthalidone (HYGROTON) 25 MG tablet,         lisinopril (ZESTRIL) 40 MG tablet            (Z12.11) Screen for colon cancer  Comment:   Plan: Colonoscopy Screening  Referral            (E78.5) Hyperlipidemia LDL goal <100  Comment: refilling meds  Plan: atorvastatin (LIPITOR) 40 MG tablet            (E89.0) S/P complete thyroidectomy  Comment: refilling  Plan: levothyroxine (SYNTHROID/LEVOTHROID) 100 MCG         tablet            (E11.69) Type 2 diabetes mellitus with other specified complication, unspecified whether long term insulin use (H)  Comment: see above  Plan: dulaglutide (TRULICITY) 0.75 MG/0.5ML pen            (R29.898) Right leg weakness  Comment: new weakness and difficulty walking, will recheck MRI - last done about 2 years ago  Plan: MR Lumbar Spine w/o Contrast        \    RTC in     Ap Alvarez MD      Benita Prado is a 58 year old, presenting for the following health issues:  Follow Up, Diabetes, and Back Pain        3/7/2024     9:36 AM   Additional Questions   Roomed by Ramya LEON     Pt believes back pain is caused by sciatic nerve in leg.    Shoulders have been in pain lately. Pt fell in shower about a year ago and hyperextended his shoulder and every once in a while it flares up.    Notes that ozempic has caused him to have daily vomiting.    Really struggling with his knee pain.  Had delayed knee replacement due to high blood sugars.    For the last 6m notes that he has to lift his R leg into the car.  Does have some balance concerns but has never  "fallen.  Has been very careful when walking. Feel impairment is not due to pain but to weakness.     History of Present Illness       Back Pain:  He presents for follow up of back pain. Patient's back pain is a recurring problem.  Location of back pain:  Right lower back, right shoulder, left shoulder and right hip  Description of back pain: dull ache  Back pain spreads: right buttocks    Since patient first noticed back pain, pain is: gradually improving  Does back pain interfere with his job:  No       Diabetes:   He presents for follow up of diabetes.  He is checking home blood glucose a few times a week.   He checks blood glucose at bedtime.  Blood glucose is never over 200 and never under 70. He is aware of hypoglycemia symptoms including none.    He has no concerns regarding his diabetes at this time.   He is not experiencing numbness or burning in feet, excessive thirst, blurry vision, weight changes or redness, sores or blisters on feet.           He eats 0-1 servings of fruits and vegetables daily.He consumes 0 sweetened beverage(s) daily.He exercises with enough effort to increase his heart rate 9 or less minutes per day.  He exercises with enough effort to increase his heart rate 3 or less days per week. He is missing 1 dose(s) of medications per week.     Review of Systems   Constitutional: Negative.    Eyes:  Negative for visual disturbance.   Respiratory:  Negative for shortness of breath.    Cardiovascular:  Negative for chest pain, palpitations and peripheral edema.   Musculoskeletal:  Positive for back pain.   Neurological:  Positive for weakness. Negative for headaches.          Objective    /78 (BP Location: Right arm, Patient Position: Sitting, Cuff Size: Adult Large)   Pulse 82   Temp 98.8  F (37.1  C) (Oral)   Resp 20   Ht 1.651 m (5' 5\")   Wt 98.9 kg (218 lb)   SpO2 97%   BMI 36.28 kg/m    Body mass index is 36.28 kg/m .  Physical Exam  Vitals and nursing note reviewed. "   Constitutional:       General: He is not in acute distress.     Appearance: He is well-developed.   Cardiovascular:      Rate and Rhythm: Normal rate and regular rhythm.      Pulses:           Dorsalis pedis pulses are 2+ on the right side and 2+ on the left side.      Heart sounds: Normal heart sounds.   Pulmonary:      Effort: Pulmonary effort is normal.      Breath sounds: Normal breath sounds.   Musculoskeletal:      Comments: L great toe - hammer toe   Skin:     General: Skin is warm and dry.      Comments: No callus or wound GE feet. Most toenails with onychomycosis   Neurological:      Mental Status: He is alert and oriented to person, place, and time.      Deep Tendon Reflexes:      Reflex Scores:       Achilles reflexes are 2+ on the right side and 2+ on the left side.     Comments: Normal filament and proprioception GE feet   Psychiatric:         Judgment: Judgment normal.            Signed Electronically by: Ap Alvarez MD

## 2024-03-08 LAB
ANION GAP SERPL CALCULATED.3IONS-SCNC: 11 MMOL/L (ref 7–15)
BUN SERPL-MCNC: 22.5 MG/DL (ref 6–20)
CALCIUM SERPL-MCNC: 9.9 MG/DL (ref 8.6–10)
CHLORIDE SERPL-SCNC: 98 MMOL/L (ref 98–107)
CREAT SERPL-MCNC: 1.53 MG/DL (ref 0.67–1.17)
DEPRECATED HCO3 PLAS-SCNC: 31 MMOL/L (ref 22–29)
EGFRCR SERPLBLD CKD-EPI 2021: 53 ML/MIN/1.73M2
GLUCOSE SERPL-MCNC: 107 MG/DL (ref 70–99)
POTASSIUM SERPL-SCNC: 3.6 MMOL/L (ref 3.4–5.3)
SODIUM SERPL-SCNC: 140 MMOL/L (ref 135–145)

## 2024-03-08 ASSESSMENT — ENCOUNTER SYMPTOMS
PALPITATIONS: 0
WEAKNESS: 1
BACK PAIN: 1
HEADACHES: 0
CONSTITUTIONAL NEGATIVE: 1
SHORTNESS OF BREATH: 0

## 2024-03-11 ENCOUNTER — TELEPHONE (OUTPATIENT)
Dept: PHARMACY | Facility: CLINIC | Age: 59
End: 2024-03-11
Payer: COMMERCIAL

## 2024-03-11 NOTE — TELEPHONE ENCOUNTER
Patient had MTM appointment scheduled for this morning but did not show up.  I called him to try to reschedule to follow-up on his diabetes but was unable to leave voicemail.  Will route to team coordinators to try to call patient to get him scheduled in the next few weeks.  He will be due to recheck his kidney function at my visit so please inform patient to stay well-hydrated and avoid over-the-counter anti-inflammatory medications like Advil, Aleve, ibuprofen, naproxen, Motrin.  Please route to triage if he has any medical questions or concerns.    Patricia Oliver, PharmD  Medication Therapy Management Pharmacist

## 2024-03-11 NOTE — TELEPHONE ENCOUNTER
Unable to LVM. One more attempt will be made.     Brooklynn Chacko  Lead   [facility] [place]

## 2024-03-12 NOTE — TELEPHONE ENCOUNTER
Unable to LVM to schedule appointment for final attempt to schedule.     Brooklynn Chacko  Lead   [facility] [place]

## 2024-03-19 ENCOUNTER — PATIENT OUTREACH (OUTPATIENT)
Dept: CARE COORDINATION | Facility: CLINIC | Age: 59
End: 2024-03-19
Payer: COMMERCIAL

## 2024-03-19 ASSESSMENT — ACTIVITIES OF DAILY LIVING (ADL): DEPENDENT_IADLS:: INDEPENDENT

## 2024-03-19 NOTE — CONFIDENTIAL NOTE
Patient Quality Outreach    Patient is due for the following:   Colon Cancer Screening  Physical Preventive Adult Physical    Next Steps:   Schedule a Adult Preventative    Type of outreach:    Sent letter.    Next Steps:  Reach out within 90 days via Letter.    Max number of attempts reached: No. Will try again in 90 days if patient still on fail list.    Questions for provider review:    None           Sahil Quinn MA

## 2024-03-21 ENCOUNTER — PATIENT OUTREACH (OUTPATIENT)
Dept: CARE COORDINATION | Facility: CLINIC | Age: 59
End: 2024-03-21
Payer: COMMERCIAL

## 2024-03-21 NOTE — LETTER
[facility] CARE COORDINATION  [address]  [facility] [address]    March 26, 2024    Johan Cole  [address] [place] [address]   [facility] [address]      Dear Johan,    I have been unsuccessful in reaching you since our last contact. At this time the Care Coordination team will make no further attempts to reach you, however this does not change your ability to continue receiving care from your providers at your primary care clinic. If you need additional support from a care coordinator in the future please contact your clinic.    All of us at [facility] are invested in your health and are here to assist you in meeting your goals.     Sincerely,    Devan Keenan, [facility]  Clinic Care Coordinator  [facility]  [phone]  [email or web address]

## 2024-03-21 NOTE — PROGRESS NOTES
Clinic Care Coordination Contact  [facility]/Voicemail    Clinical Data: Care Coordinator Outreach    Outreach Documentation Number of Outreach Attempt   2/28/2024   3:51 PM 1   3/21/2024   4:06 PM 2       Left message on patient's sister's, Myriams, voicemail with call back information and requested return call. CTC signed 5/22/23.     Plan: Care Coordinator sent [facility] letter to pt via [employer] on 2/28/24; have not received a reply since then. CHW will route to ALMA Hartman, to review for disenrollment and/or further direction per standard work. No outreaches scheduled at this time.    Shelly Bray  Community Health Worker  [facility]  [phone]

## 2024-03-26 NOTE — PROGRESS NOTES
Clinic Care Coordination Contact    Situation: Patient chart reviewed by care coordinator.    Background: Pt is enrolled in care coordination and followed to assist with goal(s) progression. Chart routed to [facility] by W for review to determine eligibility of diserolling from Care Coordination per standard work.     Assessment: Per Chart Review pt has been unreachable for follow up x2 with no further engagement or return calls.     Plan/Recommendations: Per Care Coordination standard work pt will be disenrolled from Care Coordination. Care Coordinator will send disenrollment letter with care coordinator contact information via mail. Care Coordinator will remain available, however, will do no further outreaches at this time unless a new referral is made or a change it pt status occurs. Pt has been provided with this writer's contact information and has been encouraged to call with any questions.     Devan Keenan RIVERA  Clinic Care Coordinator  [facility]  [facility]  [facility]  [phone]  [email or web address]

## 2024-04-05 DIAGNOSIS — I10 BENIGN ESSENTIAL HYPERTENSION: ICD-10-CM

## 2024-04-05 RX ORDER — CHLORTHALIDONE 25 MG/1
25 TABLET ORAL DAILY
Qty: 90 TABLET | Refills: 0 | Status: SHIPPED | OUTPATIENT
Start: 2024-04-05 | End: 2024-09-17

## 2024-05-04 DIAGNOSIS — I10 BENIGN ESSENTIAL HYPERTENSION: ICD-10-CM

## 2024-05-06 RX ORDER — CHLORTHALIDONE 25 MG/1
25 TABLET ORAL DAILY
Qty: 30 TABLET | OUTPATIENT
Start: 2024-05-06

## 2024-05-24 DIAGNOSIS — I10 BENIGN ESSENTIAL HYPERTENSION: ICD-10-CM

## 2024-05-24 RX ORDER — CHLORTHALIDONE 25 MG/1
25 TABLET ORAL DAILY
Qty: 90 TABLET | Refills: 0 | OUTPATIENT
Start: 2024-05-24

## 2024-05-30 ENCOUNTER — TELEPHONE (OUTPATIENT)
Dept: FAMILY MEDICINE | Facility: CLINIC | Age: 59
End: 2024-05-30
Payer: COMMERCIAL

## 2024-05-30 NOTE — TELEPHONE ENCOUNTER
Unable to LVM. VM Box not set up.     Patient needs to have paperwork filled out by surgeon who is performing surgery, not PCP.     Brooklynn Chacko  Lead   [facility] [place]

## 2024-05-30 NOTE — TELEPHONE ENCOUNTER
Reason for Call:  Appointment Request    Patient requesting this type of appt: Chronic Diease Management/Medication/Follow-Up    Pt has been referred for knee replacement surgery and requests visit with pcp to fill out employer work restriction form he will bring to visit    Requested provider: Ap Alvarez    Reason patient unable to be scheduled: Not within requested timeframe    When does patient want to be seen/preferred time:  asap    Comments: above    Okay to leave a detailed message?: Yes at Cell number on file:    Telephone Information:   Mobile [phone]   [employer] [phone]       Call taken on 5/30/2024 at 1:00 PM by Dee Dee Rojas

## 2024-06-10 DIAGNOSIS — I10 BENIGN ESSENTIAL HYPERTENSION: ICD-10-CM

## 2024-06-10 RX ORDER — LISINOPRIL 40 MG/1
40 TABLET ORAL DAILY
Qty: 30 TABLET | OUTPATIENT
Start: 2024-06-10

## 2024-06-11 DIAGNOSIS — E89.0 S/P COMPLETE THYROIDECTOMY: ICD-10-CM

## 2024-06-11 RX ORDER — LEVOTHYROXINE SODIUM 100 UG/1
100 TABLET ORAL DAILY
Qty: 90 TABLET | Refills: 1 | OUTPATIENT
Start: 2024-06-11

## 2024-06-24 ENCOUNTER — LAB (OUTPATIENT)
Dept: LAB | Facility: CLINIC | Age: 59
End: 2024-06-24
Payer: COMMERCIAL

## 2024-06-24 ENCOUNTER — OFFICE VISIT (OUTPATIENT)
Dept: PHARMACY | Facility: CLINIC | Age: 59
End: 2024-06-24
Payer: COMMERCIAL

## 2024-06-24 VITALS
WEIGHT: 221.8 LBS | DIASTOLIC BLOOD PRESSURE: 72 MMHG | OXYGEN SATURATION: 97 % | SYSTOLIC BLOOD PRESSURE: 113 MMHG | HEART RATE: 82 BPM | BODY MASS INDEX: 36.91 KG/M2

## 2024-06-24 DIAGNOSIS — I10 BENIGN ESSENTIAL HYPERTENSION: ICD-10-CM

## 2024-06-24 DIAGNOSIS — E11.69 TYPE 2 DIABETES MELLITUS WITH OTHER SPECIFIED COMPLICATION, UNSPECIFIED WHETHER LONG TERM INSULIN USE (H): ICD-10-CM

## 2024-06-24 DIAGNOSIS — Z79.4 TYPE 2 DIABETES MELLITUS WITH HYPERGLYCEMIA, WITH LONG-TERM CURRENT USE OF INSULIN (H): ICD-10-CM

## 2024-06-24 DIAGNOSIS — Z79.4 TYPE 2 DIABETES MELLITUS WITH HYPERGLYCEMIA, WITH LONG-TERM CURRENT USE OF INSULIN (H): Primary | ICD-10-CM

## 2024-06-24 DIAGNOSIS — E11.65 TYPE 2 DIABETES MELLITUS WITH HYPERGLYCEMIA, WITH LONG-TERM CURRENT USE OF INSULIN (H): ICD-10-CM

## 2024-06-24 DIAGNOSIS — E11.65 TYPE 2 DIABETES MELLITUS WITH HYPERGLYCEMIA, WITH LONG-TERM CURRENT USE OF INSULIN (H): Primary | ICD-10-CM

## 2024-06-24 LAB
ANION GAP SERPL CALCULATED.3IONS-SCNC: 13 MMOL/L (ref 7–15)
BUN SERPL-MCNC: 24.7 MG/DL (ref 6–20)
CALCIUM SERPL-MCNC: 10 MG/DL (ref 8.6–10)
CHLORIDE SERPL-SCNC: 100 MMOL/L (ref 98–107)
CREAT SERPL-MCNC: 1.36 MG/DL (ref 0.67–1.17)
CREAT UR-MCNC: 225 MG/DL
DEPRECATED HCO3 PLAS-SCNC: 26 MMOL/L (ref 22–29)
EGFRCR SERPLBLD CKD-EPI 2021: 61 ML/MIN/1.73M2
GLUCOSE SERPL-MCNC: 148 MG/DL (ref 70–99)
HBA1C MFR BLD: 6.7 % (ref 0–5.6)
MICROALBUMIN UR-MCNC: <12 MG/L
MICROALBUMIN/CREAT UR: NORMAL MG/G{CREAT}
POTASSIUM SERPL-SCNC: 3.6 MMOL/L (ref 3.4–5.3)
SODIUM SERPL-SCNC: 139 MMOL/L (ref 135–145)

## 2024-06-24 PROCEDURE — 82043 UR ALBUMIN QUANTITATIVE: CPT

## 2024-06-24 PROCEDURE — 99607 MTMS BY PHARM ADDL 15 MIN: CPT | Performed by: PHARMACIST

## 2024-06-24 PROCEDURE — 99606 MTMS BY PHARM EST 15 MIN: CPT | Performed by: PHARMACIST

## 2024-06-24 PROCEDURE — 82570 ASSAY OF URINE CREATININE: CPT

## 2024-06-24 PROCEDURE — 36415 COLL VENOUS BLD VENIPUNCTURE: CPT

## 2024-06-24 PROCEDURE — 83036 HEMOGLOBIN GLYCOSYLATED A1C: CPT

## 2024-06-24 PROCEDURE — 80048 BASIC METABOLIC PNL TOTAL CA: CPT

## 2024-06-24 RX ORDER — METFORMIN HCL 500 MG
1000 TABLET, EXTENDED RELEASE 24 HR ORAL 2 TIMES DAILY WITH MEALS
Qty: 360 TABLET | Refills: 1 | Status: SHIPPED | OUTPATIENT
Start: 2024-06-24

## 2024-06-24 NOTE — Clinical Note
I asked patient to follow-up with you regarding balance. I think he could benefit from physical therapy referral for balance. If you agree, could you order a referral? He is not seeing you until September.  Thanks!  Patricia

## 2024-06-24 NOTE — PATIENT INSTRUCTIONS
"Recommendations from today's MTM visit:                                                       I will ask Dr. Alvarez about physical therapy for balance. Please discuss with Dr. Alvarez at your scheduled visit on 9/6/24.  Restart Trulicity 0.75 mg once WEEKLY on the same day each week. I sent a new prescription to your pharmacy.  Stop your current metformin and restart metformin ER 1000 mg (two tablets) twice daily with meals. I sent a new prescription to your pharmacy.  Continue all other medications as prescribed.  Cross the amlodipine on your med list you use to set up your pill box at home.  Schedule follow-up with Dr. Alvarez in the next month per his order.  Please call [phone] to schedule a colonoscopy.     Follow-up: Return in 13 weeks (on 9/23/2024).    It was great speaking with you today.  I value your experience and would be very thankful for your time in providing feedback in our clinic survey. In the next few days, you may receive an email or text message from [employer] with a link to a survey related to your  clinical pharmacist.\"     To schedule another MTM appointment, please call the clinic directly or you may call the MTM scheduling line at [phone] or toll-free at [phone].     My Clinical Pharmacist's contact information:                                                      Please feel free to contact me with any questions or concerns you have.      Patricia Oliver, PharmD  Medication Therapy Management Pharmacist  [facility] - Monday and Wednesday 7:30 - 4:00    "

## 2024-06-24 NOTE — PROGRESS NOTES
Medication Therapy Management (MTM) Encounter    ASSESSMENT:                            Medication Adherence/Access: No issues identified    Diabetes:   Patient is meeting A1c goal of < 7%.  Microalbumin is not at goal <30 mg/g. Taking lisinopril at max dose. Rechecked urine albumin today. Also rechecked renal function with a steady increase in creatinine. Educated him on avoiding NSAIDs and staying well hydrated.   Patient would benefit from restarting Trulicity since he tolerated it well and A1C is at goal with no hypoglycemia. Will restart at 0.75 mg to avoid side effects since he has been off for a month. He would also benefit from switching back to metformin ER tablets to see if diarrhea improves.    Hypertension:   Patient is meeting blood pressure goal of < 140/90mmHg. Asked him to continue current medications and contact us if he starts to have hypotensive symptoms. Will also discuss balance physical therapy with his primary care provider. Patient is due for follow-up with primary care provider. Asked him to discuss further at that visit.     PLAN:                            I will ask Dr. Alvarez about physical therapy for balance. Please also schedule follow-up with Dr. Alvarez to discuss.  Restart Trulicity 0.75 mg once WEEKLY on the same day each week. I sent a new prescription to your pharmacy.  Stop your current metformin and restart metformin ER 1000 mg (two tablets) twice daily with meals. I sent a new prescription to your pharmacy.  Continue all other medications as prescribed.  Cross the amlodipine on your med list you use to set up your pill box at home.  Schedule follow-up with Dr. Alvarez in the next month per his order.  Please call [phone] to schedule a colonoscopy.     Patient would like to have someone call his sister Lynsey [phone] with any lab results from today's labs    Follow-up: Return in 13 weeks (on 9/23/2024).    SUBJECTIVE/OBJECTIVE:                          Johan Cole is  a 58 year old male seen for a follow-up visit from 2/14/24.       Reason for visit: follow-up diabetes and blood pressure.    Allergies/ADRs: Reviewed in chart  Past Medical History: Reviewed in chart  Tobacco: He reports that he has never smoked. His smokeless tobacco use includes chew.  Alcohol: not currently using    Medication Adherence/Access: rarely misses a dose    Diabetes   Metformin 1000 mg twice daily with food - this was somehow switched from ER to immediate release in March.  Trulicity 0.75 mg once weekly on Monday or Sunday night - switched from Ozempic 0.5 mg on 3/7/24 due to vomiting but has been out for about a month due to no refills.  Lisinopril 40 mg once daily  Aspirin 81mg daily    Medication History: was on Ozempic but had vomiting on 0.5 mg. He did not have issues on the 0.25 mg dose.    Patient reports having some diarrhea since switching to the immediate release formulation. He has had no issues with nausea or vomiting when he switched to Trulicity.     Blood sugar monitoring: using glucose monitor     AM fasting: has not been checking    From last MTM visit  AM fasting: can only recall one of 130  Current diabetes symptoms: none and reports not low blood sugars, lowest may have been 120    Diet/Exercise: Reports he feels less hungry when he was taking Trulicity. No other changes noted. Copied forward - He has been eating more protein and yogurt. He doesn't eat a lot of bread. He has cut back a little on pasta. He does not drink regular pop or juice. He does have an energy drink once a month.       Eye exam is up to date  Foot exam is up to date    Hypertension   Lisinopril 40 mg once daily   Chlorthalidone 25 mg daily    Medication history: was taking amlodipine 5 mg daily but it was discontinued at primary care provider visit on 3/7/24 but patient says he continued until he ran out about a month ago.    Patient reports he gets dizzy occasionally but more issues with balance. He has not  done physical therapy for balance but would be willing.  He got dizzy and passed out in his kitchen about a month ago. He thinks he was out for about 3 minutes. Reports no injuries.     He does take NSAIDs once in a while but not often.    Patient does not self-monitor blood pressure.           Today's Vitals: /72   Pulse 82   Wt 221 lb 12.8 oz (100.6 kg)   SpO2 97%   BMI 36.91 kg/m    ----------------      I spent 30 minutes with this patient today. All changes were made via collaborative practice agreement with Ap Alvarez MD. A copy of the visit note was provided to the patient's provider(s).    A summary of these recommendations was given to the patient and mailed to his sister Lynsey per patient request.    Patricia Oliver, PharmD  Medication Therapy Management Pharmacist       Medication Therapy Recommendations  Type 2 diabetes mellitus with hyperglycemia, with long-term current use of insulin (H)    Current Medication: dulaglutide (TRULICITY) 0.75 MG/0.5ML pen   Rationale: Medication product not available - Adherence - Adherence   Recommendation: Provide Education   Status: Patient Agreed - Adherence/Education   Note: sent refill          Current Medication: metFORMIN (GLUCOPHAGE) 500 MG tablet (Discontinued)   Rationale: Undesirable effect - Adverse medication event - Safety   Recommendation: Change Medication Formulation  - metFORMIN 500 MG 24 hr tablet   Status: Accepted per CPA

## 2024-08-02 ENCOUNTER — OFFICE VISIT (OUTPATIENT)
Dept: FAMILY MEDICINE | Facility: CLINIC | Age: 59
End: 2024-08-02

## 2024-08-02 ENCOUNTER — ANCILLARY PROCEDURE (OUTPATIENT)
Dept: GENERAL RADIOLOGY | Facility: CLINIC | Age: 59
End: 2024-08-02
Attending: EMERGENCY MEDICINE
Payer: COMMERCIAL

## 2024-08-02 VITALS
RESPIRATION RATE: 16 BRPM | DIASTOLIC BLOOD PRESSURE: 68 MMHG | OXYGEN SATURATION: 96 % | BODY MASS INDEX: 36.51 KG/M2 | TEMPERATURE: 98.4 F | SYSTOLIC BLOOD PRESSURE: 100 MMHG | HEART RATE: 87 BPM | WEIGHT: 219.4 LBS

## 2024-08-02 DIAGNOSIS — M25.562 ACUTE PAIN OF LEFT KNEE: ICD-10-CM

## 2024-08-02 DIAGNOSIS — M25.562 ACUTE PAIN OF LEFT KNEE: Primary | ICD-10-CM

## 2024-08-02 PROCEDURE — 99213 OFFICE O/P EST LOW 20 MIN: CPT | Performed by: EMERGENCY MEDICINE

## 2024-08-02 PROCEDURE — 73562 X-RAY EXAM OF KNEE 3: CPT | Mod: TC | Performed by: RADIOLOGY

## 2024-08-02 NOTE — PROGRESS NOTES
Impression:  Left knee pain, probably meniscal injury.    Plan:  Knee immobilizer, crutches, Tylenol for pain, follow-up with orthopedic surgery      Chief Complaint:  Patient presents with:  Musculoskeletal Problem: Coming down stairs on ramp at work and felt left knee lock up. Happened yesterday. Since then experiencing pain towards the front of knee, knee was warm to touch. Has had previous injury to knee.          HPI:   Johan Cole is a 58 year old male who presents to this clinic for the evaluation of left knee pain.  Patient was walking down some stairs at work when he developed sudden onset of knee pain and his knee locked up.  It lasted a couple minutes and then he was able to relax and move the knee again.  It had locked up and about a 30 degree flexion position.  He states when he walks on the knee he feels pain in the anterior knee.  He has had previous problems with that knee and has been told by an orthopedic surgeon that he needs to have it replaced but was waiting for his diabetes numbers to improve before he could have surgery.  He does not have any pain at rest.  He states it felt swollen and red after the injury but that is resolved.  No other joint pain.  He does have an altered gait because of a pinched nerve in his right leg.      PMH:   Past Medical History:   Diagnosis Date    High blood pressure      Past Surgical History:   Procedure Laterality Date    AS KNEE SCOPE,MED/LAT MENISCUS REPAIR      INGUINAL HERNIA REPAIR      THYROIDECTOMY   06/01/2005    TOTAL HIP ARTHROPLASTY  05/01/2015    Deosn't remember side         ROS:  All other systems negative    Meds:    Current Outpatient Medications:     aspirin 81 MG EC tablet, Take 1 tablet (81 mg) by mouth daily, Disp: 90 tablet, Rfl: 3    atorvastatin (LIPITOR) 40 MG tablet, Take 1 tablet (40 mg) by mouth daily, Disp: 90 tablet, Rfl: 3    blood glucose (NO BRAND SPECIFIED) test strip, Use to test blood sugar 1 times daily or as directed. To  accompany: Blood Glucose Monitor Brands: per insurance., Disp: 100 strip, Rfl: 6    blood glucose monitoring (NO BRAND SPECIFIED) meter device kit, Use to test blood sugar 1 times daily or as directed. Preferred blood glucose meter OR supplies to accompany: Blood Glucose Monitor Brands: per insurance., Disp: 1 kit, Rfl: 0    Blood Glucose Monitoring Suppl (ACCU-CHEK GUIDE) w/Device KIT, USE TO TEST BLOOD SUGAR 1 TIME DAILY OR AS DIRECED, Disp: , Rfl:     chlorthalidone (HYGROTON) 25 MG tablet, TAKE 1 TABLET BY MOUTH EVERY DAY, Disp: 90 tablet, Rfl: 0    dulaglutide (TRULICITY) 0.75 MG/0.5ML pen, Inject 0.75 mg Subcutaneous every 7 days, Disp: 2 mL, Rfl: 2    glucose (BD GLUCOSE) 4 g chewable tablet, Chew 4 tablets if blood sugar drops below 70, wait 15 minutes and recheck blood sugar. If blood sugar is still below 70, then chew 4 more tablets. Repeat until blood sugar is above 70., Disp: 30 tablet, Rfl: 2    levothyroxine (SYNTHROID/LEVOTHROID) 100 MCG tablet, Take 1 tablet (100 mcg) by mouth daily, Disp: 90 tablet, Rfl: 1    lisinopril (ZESTRIL) 40 MG tablet, Take 1 tablet (40 mg) by mouth daily, Disp: 90 tablet, Rfl: 1    metFORMIN (GLUCOPHAGE XR) 500 MG 24 hr tablet, Take 2 tablets (1,000 mg) by mouth 2 times daily (with meals), Disp: 360 tablet, Rfl: 1    thin (NO BRAND SPECIFIED) lancets, Use with lanceting device. To accompany: Blood Glucose Monitor Brands: per insurance., Disp: 100 each, Rfl: 6    Current Facility-Administered Medications:     2.0 mL bupivacaine (MARCAINE) 0.5% injection (50 mL vial), 2 mL, , , Crissy Ragsdale, DO, 2 mL at 01/25/24 1019    lidocaine 1 % injection 1 mL, 1 mL, , , Crissy Ragsdale, , 1 mL at 01/25/24 1019    lidocaine 1 % injection 3 mL, 3 mL, , , Crissy Ragsdale, DO, 3 mL at 01/25/24 1019        Social:  Social History     Socioeconomic History    Marital status: Single     Spouse name: Not on file    Number of children: Not on file    Years of education:  Not on file    Highest education level: Not on file   Occupational History    Not on file   Tobacco Use    Smoking status: Never    Smokeless tobacco: Current     Types: Chew   Vaping Use    Vaping status: Never Used   Substance and Sexual Activity    Alcohol use: Yes     Comment: Rare    Drug use: Never    Sexual activity: Not Currently   Other Topics Concern    Not on file   Social History Narrative    Not on file     Social Determinants of Health     Financial Resource Strain: Low Risk  (1/18/2024)    Financial Resource Strain     Within the past 12 months, have you or your family members you live with been unable to get utilities (heat, electricity) when it was really needed?: No   Food Insecurity: Low Risk  (1/18/2024)    Food Insecurity     Within the past 12 months, did you worry that your food would run out before you got money to buy more?: No     Within the past 12 months, did the food you bought just not last and you didn t have money to get more?: No   Transportation Needs: Low Risk  (1/18/2024)    Transportation Needs     Within the past 12 months, has lack of transportation kept you from medical appointments, getting your medicines, non-medical meetings or appointments, work, or from getting things that you need?: No   Physical Activity: Not on file   Stress: Not on file   Social Connections: Unknown (4/10/2023)    Received from [facility]    Social Connections     Frequency of Communication with Friends and Family: Not on file   Interpersonal Safety: Low Risk  (1/18/2024)    Interpersonal Safety     Do you feel physically and emotionally safe where you currently live?: Yes     Within the past 12 months, have you been hit, slapped, kicked or otherwise physically hurt by someone?: No     Within the past 12 months, have you been humiliated or emotionally abused in other ways by your partner or ex-partner?: No   Housing  Stability: Low Risk  (1/18/2024)    Housing Stability     Do you have housing? : Yes     Are you worried about losing your housing?: No         Physical Exam:  Vitals:    08/02/24 1109   BP: 100/68   Pulse: 87   Resp: 16   Temp: 98.4  F (36.9  C)   TempSrc: Oral   SpO2: 96%   Weight: 99.5 kg (219 lb 6.4 oz)      Patient is awake, alert, no distress  Extremities: Left knee shows moderate effusion.  Normal range of motion.  There is some tenderness over the patella.  There is no ligament laxity on Lachman testing or posterior drawer testing.  There is some laxity on varus stress testing suggesting a possible lateral collateral ligament injury.  No laxity on valgus testing.  No other bony tenderness or deformity  Skin: No lesions or rash  Neuro: Normal motor and sensory function in all extremities  Psych: Awake, alert, normally responsive      Results:    No results found for this or any previous visit (from the past 24 hour(s)).     No results found for this or any previous visit (from the past 24 hour(s)).       Ignacio Gorman MD

## 2024-08-02 NOTE — LETTER
August 2, 2024      Johan Cole  [address] [place] [id number]        To Whom It May Concern:    Johan Cole was seen in our clinic. He may return to work with the following: Crutches and no bending of the left knee or weightbearing on the left leg for 1 week       Sincerely,      PAUL WU

## 2024-08-06 ENCOUNTER — TRANSFERRED RECORDS (OUTPATIENT)
Dept: HEALTH INFORMATION MANAGEMENT | Facility: CLINIC | Age: 59
End: 2024-08-06
Payer: COMMERCIAL

## 2024-09-17 DIAGNOSIS — I10 BENIGN ESSENTIAL HYPERTENSION: ICD-10-CM

## 2024-09-17 RX ORDER — CHLORTHALIDONE 25 MG/1
25 TABLET ORAL DAILY
Qty: 90 TABLET | Refills: 0 | Status: SHIPPED | OUTPATIENT
Start: 2024-09-17

## 2024-09-30 DIAGNOSIS — E11.69 TYPE 2 DIABETES MELLITUS WITH OTHER SPECIFIED COMPLICATION, UNSPECIFIED WHETHER LONG TERM INSULIN USE (H): ICD-10-CM

## 2024-09-30 RX ORDER — METFORMIN HCL 500 MG
1000 TABLET, EXTENDED RELEASE 24 HR ORAL 2 TIMES DAILY WITH MEALS
Qty: 360 TABLET | Refills: 1 | OUTPATIENT
Start: 2024-09-30

## 2024-10-15 ENCOUNTER — OFFICE VISIT (OUTPATIENT)
Dept: FAMILY MEDICINE | Facility: CLINIC | Age: 59
End: 2024-10-15
Payer: COMMERCIAL

## 2024-10-15 VITALS
WEIGHT: 222.5 LBS | TEMPERATURE: 98 F | SYSTOLIC BLOOD PRESSURE: 114 MMHG | OXYGEN SATURATION: 100 % | HEART RATE: 66 BPM | HEIGHT: 66 IN | RESPIRATION RATE: 16 BRPM | DIASTOLIC BLOOD PRESSURE: 77 MMHG | BODY MASS INDEX: 35.76 KG/M2

## 2024-10-15 DIAGNOSIS — Z79.4 TYPE 2 DIABETES MELLITUS WITH HYPERGLYCEMIA, WITH LONG-TERM CURRENT USE OF INSULIN (H): ICD-10-CM

## 2024-10-15 DIAGNOSIS — E03.9 ACQUIRED HYPOTHYROIDISM: ICD-10-CM

## 2024-10-15 DIAGNOSIS — M17.32 POST-TRAUMATIC OSTEOARTHRITIS OF LEFT KNEE: ICD-10-CM

## 2024-10-15 DIAGNOSIS — R94.31 NONSPECIFIC ABNORMAL ELECTROCARDIOGRAM (ECG) (EKG): ICD-10-CM

## 2024-10-15 DIAGNOSIS — Z01.818 PREOP GENERAL PHYSICAL EXAM: Primary | ICD-10-CM

## 2024-10-15 DIAGNOSIS — E11.65 TYPE 2 DIABETES MELLITUS WITH HYPERGLYCEMIA, WITH LONG-TERM CURRENT USE OF INSULIN (H): ICD-10-CM

## 2024-10-15 LAB
ERYTHROCYTE [DISTWIDTH] IN BLOOD BY AUTOMATED COUNT: 14.1 % (ref 10–15)
EST. AVERAGE GLUCOSE BLD GHB EST-MCNC: 140 MG/DL
HBA1C MFR BLD: 6.5 % (ref 0–5.6)
HCT VFR BLD AUTO: 40 % (ref 40–53)
HGB BLD-MCNC: 13 G/DL (ref 13.3–17.7)
HOLD SPECIMEN: NORMAL
MCH RBC QN AUTO: 28 PG (ref 26.5–33)
MCHC RBC AUTO-ENTMCNC: 32.5 G/DL (ref 31.5–36.5)
MCV RBC AUTO: 86 FL (ref 78–100)
PLATELET # BLD AUTO: 326 10E3/UL (ref 150–450)
RBC # BLD AUTO: 4.64 10E6/UL (ref 4.4–5.9)
T4 FREE SERPL-MCNC: 1.52 NG/DL (ref 0.9–1.7)
TSH SERPL DL<=0.005 MIU/L-ACNC: 0.16 UIU/ML (ref 0.3–4.2)
WBC # BLD AUTO: 8.9 10E3/UL (ref 4–11)

## 2024-10-15 PROCEDURE — 93000 ELECTROCARDIOGRAM COMPLETE: CPT | Performed by: FAMILY MEDICINE

## 2024-10-15 PROCEDURE — 90673 RIV3 VACCINE NO PRESERV IM: CPT | Performed by: FAMILY MEDICINE

## 2024-10-15 PROCEDURE — 85027 COMPLETE CBC AUTOMATED: CPT | Performed by: FAMILY MEDICINE

## 2024-10-15 PROCEDURE — 99215 OFFICE O/P EST HI 40 MIN: CPT | Mod: 25 | Performed by: FAMILY MEDICINE

## 2024-10-15 PROCEDURE — 91320 SARSCV2 VAC 30MCG TRS-SUC IM: CPT | Performed by: FAMILY MEDICINE

## 2024-10-15 PROCEDURE — 84439 ASSAY OF FREE THYROXINE: CPT | Performed by: FAMILY MEDICINE

## 2024-10-15 PROCEDURE — 90471 IMMUNIZATION ADMIN: CPT | Performed by: FAMILY MEDICINE

## 2024-10-15 PROCEDURE — 84443 ASSAY THYROID STIM HORMONE: CPT | Performed by: FAMILY MEDICINE

## 2024-10-15 PROCEDURE — 83036 HEMOGLOBIN GLYCOSYLATED A1C: CPT | Performed by: FAMILY MEDICINE

## 2024-10-15 PROCEDURE — 36415 COLL VENOUS BLD VENIPUNCTURE: CPT | Performed by: FAMILY MEDICINE

## 2024-10-15 PROCEDURE — 90480 ADMN SARSCOV2 VAC 1/ONLY CMP: CPT | Performed by: FAMILY MEDICINE

## 2024-10-15 NOTE — LETTER
October 18, 2024      Johan Cole  [address] [place] [address] [address] [place] [id number]        Johan,     Your recent labs are within acceptable ranges.     Ap Alvarez MD     Resulted Orders   TSH WITH FREE T4 REFLEX   Result Value Ref Range    TSH 0.16 (L) 0.30 - 4.20 uIU/mL   HEMOGLOBIN A1C   Result Value Ref Range    Estimated Average Glucose 140 (H) <117 mg/dL    Hemoglobin A1C 6.5 (H) 0.0 - 5.6 %      Comment:      Normal <5.7%   Prediabetes 5.7-6.4%    Diabetes 6.5% or higher     Note: Adopted from ADA consensus guidelines.   CBC with platelets   Result Value Ref Range    WBC Count 8.9 4.0 - 11.0 10e3/uL    RBC Count 4.64 4.40 - 5.90 10e6/uL    Hemoglobin 13.0 (L) 13.3 - 17.7 g/dL    Hematocrit 40.0 40.0 - 53.0 %    MCV 86 78 - 100 fL    MCH 28.0 26.5 - 33.0 pg    MCHC 32.5 31.5 - 36.5 g/dL    RDW 14.1 10.0 - 15.0 %    Platelet Count 326 150 - 450 10e3/uL   T4 free   Result Value Ref Range    Free T4 1.52 0.90 - 1.70 ng/dL       If you have any questions or concerns, please call the clinic at the number listed above.       Sincerely,      Ap Alvarez MD

## 2024-10-15 NOTE — PROGRESS NOTES
Preoperative Evaluation  [facility]  [address] [facility] [address]  Phone: [phone]  Primary Provider: Ap Alvarez MD  Pre-op Performing Provider: Ap Alvarez MD  Oct 15, 2024             10/15/2024   Surgical Information   What procedure is being done? pre-op for surgery on the 28th of october   Facility or Hospital where procedure/surgery will be performed[facility]   Who is doing the procedure / surgery? [facility]   Date of surgery / procedure: 10/28/2024   Time of surgery / procedure: no idea   Where do you plan to recover after surgery? at home alone        Fax number for surgical facility: [phone]    Assessment and Plan    (Z01.818) Preop general physical exam  (primary encounter diagnosis)  Comment: Abnormal EKG suggesting ischemic disease.  Will have get a stress test ASAP, and will addend preop as indicated.  Addendum: cleared for surgery.  Plan: EKG 12-lead complete w/read - Clinics, CBC with        platelets, Echocardiogram Exercise Stress            (E03.9) Acquired hypothyroidism  Comment: annual labs  Plan: TSH WITH FREE T4 REFLEX            (E11.65,  Z79.4) Type 2 diabetes mellitus with hyperglycemia, with long-term current use of insulin (H)  Comment: A1c stable, has meds.  Also follows with MTM  Plan: HEMOGLOBIN A1C            (M17.32) Post-traumatic osteoarthritis of left knee  Comment:   Plan:     (R94.31) Nonspecific abnormal electrocardiogram (ECG) (EKG)  Comment:   Plan: Echocardiogram Exercise Stress              RTC in 2w prn    Ap Alvarez MD     Benita Prado is a 58 year old, presenting for the following:  Pre-Op Exam          10/15/2024     8:09 AM   Additional Questions   Roomed by Henny QUINTANA   Accompanied by self         10/15/2024     8:09 AM   Patient Reported Additional Medications   Patient reports taking the following new medications n/a     HPI related to upcoming procedure: Feeling well overall.   Denies CP, palpitations, edema, dyspnea, HA, vision changes. No n/v, change in bowel or bladder habits.        10/15/2024   Pre-Op Questionnaire   Have you ever had a heart attack or stroke? No   Have you ever had surgery on your heart or blood vessels, such as a stent placement, a coronary artery bypass, or surgery on an artery in your head, neck, heart, or legs? No   Do you have chest pain with activity? No   Do you have a history of heart failure? No   Do you currently have a cold, bronchitis or symptoms of other infection? No   Do you have a cough, shortness of breath, or wheezing? No   Do you or anyone in your family have previous history of blood clots? No   Do you or does anyone in your family have a serious bleeding problem such as prolonged bleeding following surgeries or cuts? No   Have you ever had problems with anemia or been told to take iron pills? No   Have you had any abnormal blood loss such as black, tarry or bloody stools? No   Have you ever had a blood transfusion? No   Are you willing to have a blood transfusion if it is medically needed before, during, or after your surgery? Yes   Have you or any of your relatives ever had problems with anesthesia? No   Do you have sleep apnea, excessive snoring or daytime drowsiness? (!) YES   Do you have a CPAP machine? (!) NO    Do you have any artifical heart valves or other implanted medical devices like a pacemaker, defibrillator, or continuous glucose monitor? No   Do you have artificial joints? No   Are you allergic to latex? No        Health Care Directive  Patient does not have a Health Care Directive or Living Will: Discussed advance care planning with patient; however, patient declined at this time.    Preoperative Review of    reviewed - no record of controlled substances prescribed.      Status of Chronic Conditions:  DIABETES - Patient has a longstanding history of DiabetesType Type II . Patient is being treated with oral agents and denies  significant side effects. Control has been good. Complicating factors include but are not limited to: hypertension and hyperlipidemia.     HYPERLIPIDEMIA - Patient has a long history of significant Hyperlipidemia requiring medication for treatment with recent good control. Patient reports no problems or side effects with the medication.     HYPERTENSION - Patient has longstanding history of HTN , currently denies any symptoms referable to elevated blood pressure. Specifically denies chest pain, palpitations, dyspnea, orthopnea, PND or peripheral edema. Blood pressure readings have been in normal range. Current medication regimen is as listed below. Patient denies any side effects of medication.     HYPOTHYROIDISM - Patient has a longstanding history of chronic Hypothyroidism. Patient has been doing well, noting no tremor, insomnia, hair loss or changes in skin texture. Continues to take medications as directed, without adverse reactions or side effects. Last TSH   Lab Results   Component Value Date    TSH 3.99 08/24/2023   .      Patient Active Problem List    Diagnosis Date Noted    Learning disability 05/22/2023     Priority: Medium    Microalbuminuria 05/22/2023     Priority: Medium    Hyperlipidemia LDL goal <100 05/09/2023     Priority: Medium    Type 2 diabetes mellitus with hyperglycemia, with long-term current use of insulin (H) 05/09/2023     Priority: Medium    Post-traumatic osteoarthritis of left knee 04/07/2022     Priority: Medium    DONA (obstructive sleep apnea) 04/07/2022     Priority: Medium    S/P complete thyroidectomy 04/07/2022     Priority: Medium    Benign essential hypertension 04/07/2022     Priority: Medium    Morbid obesity (H) 04/07/2022     Priority: Medium    Acquired hypothyroidism 03/08/2010     Priority: Medium     Formatting of this note is different from the original. Levothyroxine. TSH (uIU/mL) Date Value 08/19/2020 19.70 (H)      Klinefelter's syndrome 07/24/2009     Priority:  Medium    Hypogonadism in male 06/11/2008     Priority: Medium      Past Medical History:   Diagnosis Date    High blood pressure      Past Surgical History:   Procedure Laterality Date    AS KNEE SCOPE,MED/LAT MENISCUS REPAIR      INGUINAL HERNIA REPAIR      THYROIDECTOMY   06/01/2005    TOTAL HIP ARTHROPLASTY  05/01/2015    Deosn't remember side     Current Outpatient Medications   Medication Sig Dispense Refill    aspirin 81 MG EC tablet Take 1 tablet (81 mg) by mouth daily 90 tablet 3    atorvastatin (LIPITOR) 40 MG tablet Take 1 tablet (40 mg) by mouth daily 90 tablet 3    blood glucose (NO BRAND SPECIFIED) test strip Use to test blood sugar 1 times daily or as directed. To accompany: Blood Glucose Monitor Brands: per insurance. 100 strip 6    blood glucose monitoring (NO BRAND SPECIFIED) meter device kit Use to test blood sugar 1 times daily or as directed. Preferred blood glucose meter OR supplies to accompany: Blood Glucose Monitor Brands: per insurance. 1 kit 0    Blood Glucose Monitoring Suppl (ACCU-CHEK GUIDE) w/Device KIT USE TO TEST BLOOD SUGAR 1 TIME DAILY OR AS DIRECED      chlorthalidone (HYGROTON) 25 MG tablet TAKE 1 TABLET BY MOUTH EVERY DAY 90 tablet 0    dulaglutide (TRULICITY) 0.75 MG/0.5ML pen Inject 0.75 mg Subcutaneous every 7 days 2 mL 2    glucose (BD GLUCOSE) 4 g chewable tablet Chew 4 tablets if blood sugar drops below 70, wait 15 minutes and recheck blood sugar. If blood sugar is still below 70, then chew 4 more tablets. Repeat until blood sugar is above 70. 30 tablet 2    levothyroxine (SYNTHROID/LEVOTHROID) 100 MCG tablet Take 1 tablet (100 mcg) by mouth daily 90 tablet 1    lisinopril (ZESTRIL) 40 MG tablet Take 1 tablet (40 mg) by mouth daily 90 tablet 1    metFORMIN (GLUCOPHAGE XR) 500 MG 24 hr tablet Take 2 tablets (1,000 mg) by mouth 2 times daily (with meals) 360 tablet 1    thin (NO BRAND SPECIFIED) lancets Use with lanceting device. To accompany: Blood Glucose Monitor Brands:  "per insurance. 100 each 6       No Known Allergies     Social History     Tobacco Use    Smoking status: Never    Smokeless tobacco: Current     Types: Chew   Substance Use Topics    Alcohol use: Yes     Comment: Rare       History   Drug Use Unknown               Objective    /77 (BP Location: Right arm, Patient Position: Sitting, Cuff Size: Adult Regular)   Pulse 66   Temp 98  F (36.7  C) (Oral)   Resp 16   Ht 1.676 m (5' 6\")   Wt 100.9 kg (222 lb 8 oz)   SpO2 100%   BMI 35.91 kg/m     Estimated body mass index is 35.91 kg/m  as calculated from the following:    Height as of this encounter: 1.676 m (5' 6\").    Weight as of this encounter: 100.9 kg (222 lb 8 oz).  Physical Exam  Vitals and nursing note reviewed.   Constitutional:       Appearance: He is well-developed.   HENT:      Head: Normocephalic and atraumatic.      Right Ear: Tympanic membrane, ear canal and external ear normal.      Left Ear: Tympanic membrane, ear canal and external ear normal.   Eyes:      Pupils: Pupils are equal, round, and reactive to light.   Neck:      Thyroid: No thyromegaly.   Cardiovascular:      Rate and Rhythm: Normal rate and regular rhythm.      Heart sounds: Normal heart sounds.   Pulmonary:      Effort: Pulmonary effort is normal.      Breath sounds: Normal breath sounds.   Abdominal:      General: Bowel sounds are normal.      Palpations: Abdomen is soft. There is no mass.   Musculoskeletal:         General: Normal range of motion.   Lymphadenopathy:      Cervical: No cervical adenopathy.   Skin:     General: Skin is warm and dry.      Findings: No rash.   Neurological:      Mental Status: He is alert and oriented to person, place, and time.   Psychiatric:         Judgment: Judgment normal.         Recent Labs   Lab Test 06/24/24  0943 03/07/24  0856 02/14/24  0823    140 143   POTASSIUM 3.6 3.6 3.5   CR 1.36* 1.53* 1.29*   A1C 6.7*  --  7.6*        Diagnostics  Recent Results (from the past 24 hour(s)) "   HEMOGLOBIN A1C    Collection Time: 10/15/24  9:04 AM   Result Value Ref Range    Estimated Average Glucose 140 (H) <117 mg/dL    Hemoglobin A1C 6.5 (H) 0.0 - 5.6 %   CBC with platelets    Collection Time: 10/15/24  9:04 AM   Result Value Ref Range    WBC Count 8.9 4.0 - 11.0 10e3/uL    RBC Count 4.64 4.40 - 5.90 10e6/uL    Hemoglobin 13.0 (L) 13.3 - 17.7 g/dL    Hematocrit 40.0 40.0 - 53.0 %    MCV 86 78 - 100 fL    MCH 28.0 26.5 - 33.0 pg    MCHC 32.5 31.5 - 36.5 g/dL    RDW 14.1 10.0 - 15.0 %    Platelet Count 326 150 - 450 10e3/uL      EKG: appears normal, NSR, inverted T waves in I, V1, V2, V5, V6, normal axis, normal intervals, no acute ST/T changes c/w ischemia, no LVH by voltage criteria, there are no prior tracings available    Revised Cardiac Risk Index (RCRI)  The patient has the following serious cardiovascular risks for perioperative complications:   - No serious cardiac risks = 0 points     RCRI Interpretation: 0 points: Class I (very low risk - 0.4% complication rate)         Signed Electronically by: Ap Alvarez MD  A copy of this evaluation report is provided to the requesting physician.

## 2024-10-15 NOTE — PATIENT INSTRUCTIONS
How to Take Your Medication Before Surgery  Preoperative Medication Instructions   Antiplatelet or Anticoagulation Medication Instructions   - Patient is on no antiplatelet or anticoagulation medications.    Additional Medication Instructions   - metformin: DO NOT TAKE day of surgery.   - GLP-1 Injectable (exenitide, liraglutide, semaglutide, dulaglutide, etc.): DO NOT TAKE 7 days before surgery

## 2024-10-22 ENCOUNTER — LAB (OUTPATIENT)
Dept: LAB | Facility: CLINIC | Age: 59
End: 2024-10-22
Payer: COMMERCIAL

## 2024-10-22 ENCOUNTER — HOSPITAL ENCOUNTER (OUTPATIENT)
Dept: CARDIOLOGY | Facility: CLINIC | Age: 59
Discharge: HOME | End: 2024-10-22
Attending: FAMILY MEDICINE | Admitting: FAMILY MEDICINE
Payer: COMMERCIAL

## 2024-10-22 DIAGNOSIS — R94.31 NONSPECIFIC ABNORMAL ELECTROCARDIOGRAM (ECG) (EKG): ICD-10-CM

## 2024-10-22 DIAGNOSIS — Z01.818 PREOP GENERAL PHYSICAL EXAM: ICD-10-CM

## 2024-10-22 DIAGNOSIS — Z47.1 AFTERCARE FOLLOWING JOINT REPLACEMENT: Primary | ICD-10-CM

## 2024-10-22 LAB
ANION GAP SERPL CALCULATED.3IONS-SCNC: 15 MMOL/L (ref 7–15)
BUN SERPL-MCNC: 13.6 MG/DL (ref 6–20)
CALCIUM SERPL-MCNC: 9.6 MG/DL (ref 8.8–10.4)
CHLORIDE SERPL-SCNC: 103 MMOL/L (ref 98–107)
CREAT SERPL-MCNC: 1.28 MG/DL (ref 0.67–1.17)
EGFRCR SERPLBLD CKD-EPI 2021: 66 ML/MIN/1.73M2
GLUCOSE SERPL-MCNC: 87 MG/DL (ref 70–99)
HCO3 SERPL-SCNC: 22 MMOL/L (ref 22–29)
POTASSIUM SERPL-SCNC: 3.5 MMOL/L (ref 3.4–5.3)
SODIUM SERPL-SCNC: 140 MMOL/L (ref 135–145)

## 2024-10-22 PROCEDURE — 999N000208 ECHO STRESS ECHOCARDIOGRAM

## 2024-10-22 PROCEDURE — 255N000002 HC RX 255 OP 636: Performed by: FAMILY MEDICINE

## 2024-10-22 PROCEDURE — 93321 DOPPLER ECHO F-UP/LMTD STD: CPT | Mod: 26 | Performed by: INTERNAL MEDICINE

## 2024-10-22 PROCEDURE — 36415 COLL VENOUS BLD VENIPUNCTURE: CPT

## 2024-10-22 PROCEDURE — 93350 STRESS TTE ONLY: CPT | Mod: 26 | Performed by: INTERNAL MEDICINE

## 2024-10-22 PROCEDURE — 93016 CV STRESS TEST SUPVJ ONLY: CPT | Performed by: INTERNAL MEDICINE

## 2024-10-22 PROCEDURE — 93018 CV STRESS TEST I&R ONLY: CPT | Performed by: INTERNAL MEDICINE

## 2024-10-22 PROCEDURE — 80048 BASIC METABOLIC PNL TOTAL CA: CPT

## 2024-10-22 PROCEDURE — 93325 DOPPLER ECHO COLOR FLOW MAPG: CPT | Mod: 26 | Performed by: INTERNAL MEDICINE

## 2024-10-22 RX ADMIN — PERFLUTREN 2 ML: 6.52 INJECTION, SUSPENSION INTRAVENOUS at 13:33

## 2024-10-24 ENCOUNTER — TELEPHONE (OUTPATIENT)
Dept: FAMILY MEDICINE | Facility: CLINIC | Age: 59
End: 2024-10-24
Payer: COMMERCIAL

## 2024-10-24 NOTE — TELEPHONE ENCOUNTER
Received call from Michela with [facility], regarding pre-op. Patient went in for stress echo 10/22/24. Patient had been told PCP would addend notes after patient had echo. Please addend note and advise if patient cleared for surgery.     Sunil VARMA RN 10/24/2024 at 12:57 PM

## 2024-10-25 ENCOUNTER — TELEPHONE (OUTPATIENT)
Dept: FAMILY MEDICINE | Facility: CLINIC | Age: 59
End: 2024-10-25
Payer: COMMERCIAL

## 2024-10-28 ENCOUNTER — TELEPHONE (OUTPATIENT)
Dept: FAMILY MEDICINE | Facility: CLINIC | Age: 59
End: 2024-10-28
Payer: COMMERCIAL

## 2024-10-28 NOTE — TELEPHONE ENCOUNTER
General Call      Reason for Call:  knee replacement    What are your questions or concerns:  he is having knee replacmemnt surgery today and and is having a hard time finding a cryo cuff for his (ice machine)    Date of last appointment with provider: 6-24-24    Okay to leave a detailed message?: Yes at Other phone number:  [phone]

## 2024-10-28 NOTE — TELEPHONE ENCOUNTER
Returned call to patient's sister (CTC on file). Recommend she discuss further with ortho, she says she has but was just checking to see if we had any other ideas as she is struggling to find this product. She could check [employer], other supply stores. She will do that. No further questions.     Huma Guerrero RN on 10/28/2024 at 9:05 AM

## 2024-10-29 DIAGNOSIS — E89.0 S/P COMPLETE THYROIDECTOMY: ICD-10-CM

## 2024-10-29 RX ORDER — LEVOTHYROXINE SODIUM 100 UG/1
100 TABLET ORAL DAILY
Qty: 90 TABLET | Refills: 1 | Status: SHIPPED | OUTPATIENT
Start: 2024-10-29

## 2024-11-13 ENCOUNTER — TRANSFERRED RECORDS (OUTPATIENT)
Dept: HEALTH INFORMATION MANAGEMENT | Facility: CLINIC | Age: 59
End: 2024-11-13
Payer: COMMERCIAL

## 2024-11-15 ENCOUNTER — TELEPHONE (OUTPATIENT)
Dept: FAMILY MEDICINE | Facility: CLINIC | Age: 59
End: 2024-11-15
Payer: COMMERCIAL

## 2024-11-15 NOTE — TELEPHONE ENCOUNTER
Forms/Letter Request    Type of form/letter: OTHER: Certification of Need for Special Transportation      Do we have the form/letter: Yes:  basket    Who is the form from? [employer]    Where did/will the form come from? form was faxed in    When is form/letter needed by: ASAP    How would you like the form/letter returned: Fax : [phone]    Imelda Mistry Patient Rep.

## 2024-11-15 NOTE — TELEPHONE ENCOUNTER
Placed in provider's basket for review and signature.     Brooklynn Chacko  Lead   [facility]

## 2024-11-18 NOTE — TELEPHONE ENCOUNTER
"""Informed patient that their capsular opacification is not visually significant or does not meet the minimum criteria for capsulotomy.  Recommended attention to PCO symptoms Faxed.     Brooklynn STAFFORD  Lead   [facility]

## 2024-11-22 ENCOUNTER — TELEPHONE (OUTPATIENT)
Dept: FAMILY MEDICINE | Facility: CLINIC | Age: 59
End: 2024-11-22
Payer: COMMERCIAL

## 2024-11-22 NOTE — TELEPHONE ENCOUNTER
Forms/Letter Request    Type of form/letter: OTHER: Needs a medical statement saying that he is unable to work (starting date when he was seen for his knee in about July) due to his knee.      Do we have the form/letter: No    Who is the form from? N/A (if other please explain)    Where did/will the form come from? Patient or family brought in    (information given over phone)    When is form/letter needed by: as soon as possible    How would you like the form/letter returned:  email: [email or web address]    Patient Notified form requests are processed in 5-7 business days:Yes    Okay to leave a detailed message?: Yes at Other phone number:  [phone]

## 2024-11-22 NOTE — TELEPHONE ENCOUNTER
Routing to provider to see if virtual is needed for letter.     Brooklynn Chacko  Lead   [facility]

## 2024-11-25 NOTE — TELEPHONE ENCOUNTER
ALBINM to call back for an appointment. Two more attempts will be made.     Brooklynn Chacko  Lead   [facility]

## 2024-11-26 NOTE — TELEPHONE ENCOUNTER
LVM to call back to schedule an appointment. One more attempt will be made.     Brooklynn Chacko  Lead   [facility]

## 2024-11-27 NOTE — TELEPHONE ENCOUNTER
Pt has been scheduled for a VV to discuss forms on 12/9.     Tonie [facility]   [facility]

## 2024-12-09 ENCOUNTER — VIRTUAL VISIT (OUTPATIENT)
Dept: FAMILY MEDICINE | Facility: CLINIC | Age: 59
End: 2024-12-09
Payer: COMMERCIAL

## 2024-12-09 DIAGNOSIS — M17.32 POST-TRAUMATIC OSTEOARTHRITIS OF LEFT KNEE: Primary | ICD-10-CM

## 2024-12-09 DIAGNOSIS — R26.81 UNSTEADY GAIT: ICD-10-CM

## 2024-12-09 PROCEDURE — 99213 OFFICE O/P EST LOW 20 MIN: CPT | Mod: 95 | Performed by: FAMILY MEDICINE

## 2024-12-09 NOTE — PROGRESS NOTES
"Johan is a 59 year old who is being evaluated via a billable video visit.          Assessment and Plan    (M17.32) Post-traumatic osteoarthritis of left knee  (primary encounter diagnosis)  Comment: letter complete, will work with clinci staff to get to his sister, arian  Plan:     (R26.81) Unsteady gait  Comment: advised to talk with PT first as I suspect this may be linked ot his knee injury.  If not he should be seen for further eval  Plan:       RTC in 1m PRN    Ap Alvarez MD     Subjective   Johan is a 59 year old, presenting for the following health issues:  No chief complaint on file.      12/9/2024     3:14 PM   Additional Questions   Roomed by gladys rodriguez   Accompanied by sister - Arian         12/9/2024     3:14 PM   Patient Reported Additional Medications   Patient reports taking the following new medications na     Video Start Time: 3:35 PM    HPI     Needs work letter. Hurt his knee recently and got surgery. Need letter to explain why he couldn't work from Mid July -10/28. Need for child support.    Has nausea and vomiting in the mornings and unsure from what.  Had a pretty intense day about 5 days ago and then it resolved  has not happened since.    Difficulty with balance all day as well. Notes mostly when he gets up or when walking with any distance.  No flushing, \"white-out\" or other s c/w syncope, more like he's having trouble keeping his balance.  Started before his surgery, but has been slowly getting worse.  Has not addressed     Was not able to work effective mid July.  Per ortho can not work until 2/1/25, possibly later.  But they can amange back to work, he needs a letter now to cover him from his injury until today.            Objective           Vitals:  No vitals were obtained today due to virtual visit.    Physical Exam   GENERAL: alert and no distress  EYES: Eyes grossly normal to inspection.  No discharge or erythema, or obvious scleral/conjunctival abnormalities.  RESP: No audible wheeze, " cough, or visible cyanosis.    SKIN: Visible skin clear. No significant rash, abnormal pigmentation or lesions.  NEURO: Cranial nerves grossly intact.  Mentation and speech appropriate for age.  PSYCH: Appropriate affect, tone, and pace of words          Video-Visit Details    Type of service:  Video Visit   Video End Time:3:52 PM  Originating Location (pt. Location): Home    Distant Location (provider location):  Off-site  Platform used for Video Visit: Marley  Signed Electronically by: Ap Alvarez MD

## 2024-12-09 NOTE — LETTER
December 9, 2024      Johan Cole  [address] [place] [id number]        To Whom It May Concern:    Johan Cole injured his knee on/around July 30, 2024, he was seen on 8/2/24.  He had surgery 10/28/24.  He has been unable to work since the day of his injury through the present.  It is my understanding that his surgeon does not want him to work through 1/31/25.        Sincerely,        Ap Alvarez MD

## 2024-12-13 DIAGNOSIS — I10 BENIGN ESSENTIAL HYPERTENSION: ICD-10-CM

## 2024-12-13 RX ORDER — CHLORTHALIDONE 25 MG/1
25 TABLET ORAL DAILY
Qty: 90 TABLET | Refills: 0 | Status: SHIPPED | OUTPATIENT
Start: 2024-12-13

## 2024-12-13 NOTE — TELEPHONE ENCOUNTER
Clinic RN: Please investigate patient's chart or contact patient if the information cannot be found because patient should have run out of this medication on 9/7/24. Confirm patient is taking this medication as prescribed. Document findings and route refill encounter to provider for approval or denial.  Homa Gomez RN, BSN  [facility]

## 2024-12-16 NOTE — TELEPHONE ENCOUNTER
Attempted to call patient, no answer and VM not set up. Does not have MC.    Huma Guerrero RN on 12/16/2024 at 3:58 PM

## 2024-12-17 RX ORDER — LISINOPRIL 40 MG/1
40 TABLET ORAL DAILY
Qty: 90 TABLET | Refills: 1 | Status: SHIPPED | OUTPATIENT
Start: 2024-12-17

## 2024-12-17 NOTE — TELEPHONE ENCOUNTER
Attempt #3 to call pt.  No answer and no VM set up.  Unable to leave VM.    Routing to provider to approve or deny refill request.    Homa Gomez RN, BSN  [facility]

## 2024-12-29 DIAGNOSIS — E11.69 TYPE 2 DIABETES MELLITUS WITH OTHER SPECIFIED COMPLICATION, UNSPECIFIED WHETHER LONG TERM INSULIN USE (H): ICD-10-CM

## 2024-12-30 RX ORDER — METFORMIN HYDROCHLORIDE 500 MG/1
1000 TABLET, EXTENDED RELEASE ORAL 2 TIMES DAILY WITH MEALS
Qty: 360 TABLET | Refills: 1 | Status: SHIPPED | OUTPATIENT
Start: 2024-12-30

## 2025-01-15 ENCOUNTER — LAB (OUTPATIENT)
Dept: LAB | Facility: CLINIC | Age: 60
End: 2025-01-15
Payer: COMMERCIAL

## 2025-01-15 ENCOUNTER — OFFICE VISIT (OUTPATIENT)
Dept: PHARMACY | Facility: CLINIC | Age: 60
End: 2025-01-15
Attending: FAMILY MEDICINE
Payer: COMMERCIAL

## 2025-01-15 VITALS
HEART RATE: 86 BPM | WEIGHT: 224.5 LBS | DIASTOLIC BLOOD PRESSURE: 70 MMHG | OXYGEN SATURATION: 95 % | SYSTOLIC BLOOD PRESSURE: 110 MMHG | BODY MASS INDEX: 36.24 KG/M2

## 2025-01-15 DIAGNOSIS — I10 BENIGN ESSENTIAL HYPERTENSION: Primary | ICD-10-CM

## 2025-01-15 DIAGNOSIS — E11.65 TYPE 2 DIABETES MELLITUS WITH HYPERGLYCEMIA, WITH LONG-TERM CURRENT USE OF INSULIN (H): Primary | ICD-10-CM

## 2025-01-15 DIAGNOSIS — E11.65 TYPE 2 DIABETES MELLITUS WITH HYPERGLYCEMIA, WITH LONG-TERM CURRENT USE OF INSULIN (H): ICD-10-CM

## 2025-01-15 DIAGNOSIS — Z79.4 TYPE 2 DIABETES MELLITUS WITH HYPERGLYCEMIA, WITH LONG-TERM CURRENT USE OF INSULIN (H): ICD-10-CM

## 2025-01-15 DIAGNOSIS — Z79.4 TYPE 2 DIABETES MELLITUS WITH HYPERGLYCEMIA, WITH LONG-TERM CURRENT USE OF INSULIN (H): Primary | ICD-10-CM

## 2025-01-15 LAB
EST. AVERAGE GLUCOSE BLD GHB EST-MCNC: 151 MG/DL
HBA1C MFR BLD: 6.9 % (ref 0–5.6)

## 2025-01-15 PROCEDURE — 36415 COLL VENOUS BLD VENIPUNCTURE: CPT

## 2025-01-15 PROCEDURE — 83036 HEMOGLOBIN GLYCOSYLATED A1C: CPT

## 2025-01-15 PROCEDURE — 80061 LIPID PANEL: CPT

## 2025-01-15 RX ORDER — CHLORTHALIDONE 25 MG/1
12.5 TABLET ORAL DAILY
Qty: 45 TABLET | Refills: 0 | Status: SHIPPED | OUTPATIENT
Start: 2025-01-15

## 2025-01-15 RX ORDER — LISINOPRIL 40 MG/1
40 TABLET ORAL DAILY
Qty: 90 TABLET | Refills: 1 | Status: SHIPPED | OUTPATIENT
Start: 2025-01-15

## 2025-01-15 NOTE — Clinical Note
His dizziness has worsened. His blood pressure on the lower end and having symptoms so I decreased chlorthalidone today. I am following up in a month but asked him to schedule with you to discuss further if no improvement. See note for details regarding his diabetes.  Patricia Oliver

## 2025-01-15 NOTE — PROGRESS NOTES
Medication Therapy Management (MTM) Encounter    ASSESSMENT:                            Medication Adherence/Access: No issues identified.    Diabetes:   Patient is meeting A1c goal of < 7% but has increased since stopping Trulicity in Sept 2024 and not restarting after knee surgery. He was having some nausea and dry heaving a few times a week on the lowest dose of Trulicity. Will hold off on restarting Trulicity today and discussed lifestyle modifications. Asked him to start checking blood sugars at home and bring meter to next visit. If GLP-1 needs to be restarted could consider starting Mounjaro instead of Trulicity to see if he tolerates better.  Microalbumin is not at goal <30 mg/g. Taking lisinopril at max dose.     Hypertension:   Patient is meeting blood pressure goal of < 140/90mmHg and on lower end with dizziness which has increased.  He would benefit from decreasing chlorthalidone today and will follow-up in a month. I also asked him to schedule with primary care provider to discuss in case lowering medications does not work.     PLAN:                            Schedule with Dr. Alvarez to discuss your ongoing worsening dizziness.  Stop at lab and have your A1C rechecked  If your A1C is over 7% we will plan to restart Trulicity and I would send a new prescription for this.  Decrease chlorthalidone to half tablet (12.5 mg) once daily  Start checking more blood sugars and bring meter to next visit  Try to limit sweets and carbs. Limit toast to two pieces.  Your A1C increased slightly to 6.9% and your goal is to keep it less than 7%. If your A1C increases anymore we will have to add another medication. Please try to make diet changes to avoid having to add another medication.    Follow-up: Return in 4 weeks (on 2/12/2025).    SUBJECTIVE/OBJECTIVE:                          Johan Cole is a 59 year old male seen for a follow-up visit from 6/24/24.       Reason for visit: follow-up diabetes.    Allergies/ADRs:  Reviewed in chart  Past Medical History: Reviewed in chart  Tobacco: He reports that he has never smoked. His smokeless tobacco use includes chew.  Alcohol: not currently using    Medication Adherence/Access: no issues reported.    Diabetes   Metformin 1000 mg ER twice daily with food - switched to ER at last visit.  Trulicity 0.75 mg once weekly on Monday - stopped in Sept 2024 prior to knee surgery and never restarted.  Lisinopril 40 mg once daily  Aspirin 81mg daily    Medication History: was on Ozempic but had vomiting on 0.5 mg. He did not have issues on the 0.25 mg dose. When he was on Trulicity 0.75 mg he did have dry heaving twice or three times a week only in the morning before eating anything.    Patient reports having some diarrhea and it varies from week to week. He is not sure if it has decreased since switching to metformin ER.     Blood sugar monitoring: using glucose monitor     AM fasting: not checking currently    From last [facility] visit  AM fasting: has not been checking    Current diabetes symptoms: none and reports not low blood sugars, lowest may have been 120    Diet/Exercise: Reports he doesn't eat much. He has something to eat twice a day. Only eats when he gets hungry. Yesterday he had 4 pieces of toast with honey. He does like milk and will have a gallon in about a week. He also like ice cream and will have a bowl twice a week. Does not drink regular pop. He will have noodles maybe once a week but has cut back in the last year.       Eye exam is up to date  Foot exam is up to date    Hypertension   Lisinopril 40 mg once daily   Chlorthalidone 25 mg daily    Medication history: was taking amlodipine 5 mg daily but it was discontinued at primary care provider visit on 3/7/24 but patient says he continued until he ran out about a month ago.    Patient reports he gets dizzy spells all the time when he is not using the walker. This has gotten worse since out last visit. He is doing physical therapy  currently post knee replacement. Unsure if he has done specific physical therapy for balance.      He is only using acetaminophen for pain, denies using NSAIDs.    Patient does not self-monitor blood pressure.           Today's Vitals: /70   Pulse 86   Wt 224 lb 8 oz (101.8 kg)   SpO2 95%   BMI 36.24 kg/m    ----------------    I spent 30 minutes with this patient today. All changes were made via collaborative practice agreement with Ap Alvarez MD.     A summary of these recommendations was given to the patient.    Patricia Oliver, PharmD  Medication Therapy Management Pharmacist     Medication Therapy Recommendations  Benign essential hypertension   1 Current Medication: chlorthalidone (HYGROTON) 25 MG tablet (Discontinued)   Current Medication Sig: TAKE 1 TABLET BY MOUTH EVERY DAY   Rationale: Undesirable effect - Adverse medication event - Safety   Recommendation: Decrease Dose   Status: Accepted per CPA   Identified Date: 1/15/2025 Completed Date: 1/15/2025

## 2025-01-15 NOTE — PATIENT INSTRUCTIONS
"Recommendations from today's MTM visit:                                                       Schedule with Dr. Alvarez to discuss your ongoing worsening dizziness.  Stop at lab and have your A1C rechecked  If your A1C is over 7% we will plan to restart Trulicity and I would send a new prescription for this.  Decrease chlorthalidone to half tablet (12.5 mg) once daily and see if dizziness improves.  Start checking more blood sugars and bring meter to next visit  Try to limit sweets and carbs. Limit toast to two pieces.  Your A1C increased slightly to 6.9% and your goal is to keep it less than 7%. If your A1C increases anymore we will have to add another medication. Please try to make diet changes to avoid having to add another medication.    Follow-up: Return in 4 weeks (on 2/12/2025).    It was great speaking with you today.  I value your experience and would be very thankful for your time in providing feedback in our clinic survey. In the next few days, you may receive an email or text message from [employer] with a link to a survey related to your  clinical pharmacist.\"     To schedule another MTM appointment, please call the clinic directly or you may call the MTM scheduling line at [phone] or toll-free at [phone].     My Clinical Pharmacist's contact information:                                                      Please feel free to contact me with any questions or concerns you have.      Patricia Oliver, PharmD  Medication Therapy Management Pharmacist  [facility] - Monday and Wednesday 7:30 - 4:00    "

## 2025-01-16 LAB
CHOLEST SERPL-MCNC: 175 MG/DL
FASTING STATUS PATIENT QL REPORTED: NO
HDLC SERPL-MCNC: 40 MG/DL
LDLC SERPL CALC-MCNC: 105 MG/DL
NONHDLC SERPL-MCNC: 135 MG/DL
TRIGL SERPL-MCNC: 151 MG/DL

## 2025-02-13 ENCOUNTER — TRANSFERRED RECORDS (OUTPATIENT)
Dept: HEALTH INFORMATION MANAGEMENT | Facility: CLINIC | Age: 60
End: 2025-02-13
Payer: COMMERCIAL

## 2025-02-27 ENCOUNTER — TRANSFERRED RECORDS (OUTPATIENT)
Dept: HEALTH INFORMATION MANAGEMENT | Facility: CLINIC | Age: 60
End: 2025-02-27
Payer: COMMERCIAL

## 2025-04-08 ENCOUNTER — TELEPHONE (OUTPATIENT)
Dept: FAMILY MEDICINE | Facility: CLINIC | Age: 60
End: 2025-04-08
Payer: COMMERCIAL

## 2025-04-08 NOTE — CONFIDENTIAL NOTE
Patient Quality Outreach    Patient is due for the following:   Physical Annual Wellness Visit    Action(s) Taken:   Patient has upcoming appointment, these items will be addressed at that time.    Type of outreach:    Chart review performed, no outreach needed.    Questions for provider review:    None         Sahil Quinn MA  Chart routed to .

## 2025-05-03 ENCOUNTER — HEALTH MAINTENANCE LETTER (OUTPATIENT)
Age: 60
End: 2025-05-03

## 2025-05-27 DIAGNOSIS — Z98.890 S/P COMPLETE THYROIDECTOMY: ICD-10-CM

## 2025-05-27 DIAGNOSIS — Z90.89 S/P COMPLETE THYROIDECTOMY: ICD-10-CM

## 2025-05-27 RX ORDER — LEVOTHYROXINE SODIUM 100 UG/1
100 TABLET ORAL
Qty: 90 TABLET | Refills: 1 | Status: SHIPPED | OUTPATIENT
Start: 2025-05-27

## 2025-06-04 ENCOUNTER — OFFICE VISIT (OUTPATIENT)
Dept: PHARMACY | Facility: CLINIC | Age: 60
End: 2025-06-04
Payer: COMMERCIAL

## 2025-06-04 ENCOUNTER — LAB (OUTPATIENT)
Dept: LAB | Facility: CLINIC | Age: 60
End: 2025-06-04
Payer: COMMERCIAL

## 2025-06-04 ENCOUNTER — TELEPHONE (OUTPATIENT)
Dept: FAMILY MEDICINE | Facility: CLINIC | Age: 60
End: 2025-06-04

## 2025-06-04 VITALS
HEART RATE: 104 BPM | BODY MASS INDEX: 38.62 KG/M2 | WEIGHT: 239.3 LBS | OXYGEN SATURATION: 95 % | DIASTOLIC BLOOD PRESSURE: 59 MMHG | SYSTOLIC BLOOD PRESSURE: 91 MMHG

## 2025-06-04 DIAGNOSIS — I10 BENIGN ESSENTIAL HYPERTENSION: ICD-10-CM

## 2025-06-04 DIAGNOSIS — Z79.4 TYPE 2 DIABETES MELLITUS WITH HYPERGLYCEMIA, WITH LONG-TERM CURRENT USE OF INSULIN (H): Primary | ICD-10-CM

## 2025-06-04 DIAGNOSIS — E11.65 TYPE 2 DIABETES MELLITUS WITH HYPERGLYCEMIA, WITH LONG-TERM CURRENT USE OF INSULIN (H): Primary | ICD-10-CM

## 2025-06-04 DIAGNOSIS — R42 DIZZINESS: ICD-10-CM

## 2025-06-04 DIAGNOSIS — E78.5 HYPERLIPIDEMIA LDL GOAL <100: ICD-10-CM

## 2025-06-04 LAB
CREAT UR-MCNC: 152 MG/DL
EST. AVERAGE GLUCOSE BLD GHB EST-MCNC: 352 MG/DL
HBA1C MFR BLD: 13.9 % (ref 0–5.6)
MICROALBUMIN UR-MCNC: 30.7 MG/L
MICROALBUMIN/CREAT UR: 20.2 MG/G CR (ref 0–17)

## 2025-06-04 PROCEDURE — 83036 HEMOGLOBIN GLYCOSYLATED A1C: CPT

## 2025-06-04 PROCEDURE — 82570 ASSAY OF URINE CREATININE: CPT

## 2025-06-04 PROCEDURE — 36415 COLL VENOUS BLD VENIPUNCTURE: CPT

## 2025-06-04 PROCEDURE — 3074F SYST BP LT 130 MM HG: CPT | Performed by: PHARMACIST

## 2025-06-04 PROCEDURE — 99607 MTMS BY PHARM ADDL 15 MIN: CPT | Performed by: PHARMACIST

## 2025-06-04 PROCEDURE — 3078F DIAST BP <80 MM HG: CPT | Performed by: PHARMACIST

## 2025-06-04 PROCEDURE — 99606 MTMS BY PHARM EST 15 MIN: CPT | Performed by: PHARMACIST

## 2025-06-04 PROCEDURE — 82043 UR ALBUMIN QUANTITATIVE: CPT

## 2025-06-04 RX ORDER — ATORVASTATIN CALCIUM 40 MG/1
40 TABLET, FILM COATED ORAL DAILY
Qty: 90 TABLET | Refills: 3 | Status: SHIPPED | OUTPATIENT
Start: 2025-06-04

## 2025-06-04 RX ORDER — KETOROLAC TROMETHAMINE 30 MG/ML
1 INJECTION, SOLUTION INTRAMUSCULAR; INTRAVENOUS DAILY
Qty: 1 EACH | Refills: 0 | Status: SHIPPED | OUTPATIENT
Start: 2025-06-04

## 2025-06-04 RX ORDER — HYDROCHLOROTHIAZIDE 12.5 MG/1
CAPSULE ORAL
Qty: 6 EACH | Refills: 1 | Status: SHIPPED | OUTPATIENT
Start: 2025-06-04

## 2025-06-04 NOTE — TELEPHONE ENCOUNTER
Incoming call from patient     Explained that the pharmacy did not receive the Trulicity     It appears that patient just spoke to an RN a few minutes ago     This writer attempted to gather more information from patient and he hung up on RN     Agnieszka appears to possibly need PA per review, will wait for fax from pharmacy or return call from patient     Whitley Mix Registered Nurse  [facility]

## 2025-06-04 NOTE — PATIENT INSTRUCTIONS
"Recommendations from today's MTM visit:                                                       Schedule diabetes eye exam.  Schedule with Dr. Alvarez but if you can get in to see him soon then scheduled with Misti our extender provider to discuss your concerns about dizziness and shoulders.  Start using the Freestyle Dillon 3 continuous glucose monitor. Here is a website with training videos - https://www.[employer].abbott/us-en/freestyle-dillon-3-resources.html  If you have any issues with the sensor falling off or error messages you can call [employer] service [phone]  Start checking blood sugars at least once a day fasting in the morning if you are not able to get the freestyle continuous glucose monitor.  Call [phone] to schedule diabetes education visit to learn more about what foods increase blood sugars.   Reduce portion sizes of higher carb foods like pasta, rice, cereal, snack foods like crackers, popcorn, chips etc.    Restart Trulicity at 0.75 mg once weekly. Start as soon as possible.  Cut out orange juice and try to cut back on milk  Restart the atorvastatin, that one helps lower your bad cholesterol.   Stop chlorthalidone     Follow-up: Return in 19 days (on 6/23/2025).    It was great speaking with you today.  I value your experience and would be very thankful for your time in providing feedback in our clinic survey. In the next few days, you may receive an email or text message from [employer] with a link to a survey related to your  clinical pharmacist.\"     To schedule another MTM appointment, please call the clinic directly or you may call the MTM scheduling line at [phone] or toll-free at [phone].     My Clinical Pharmacist's contact information:                                                      Please feel free to contact me with any questions or concerns you have.      Patricia Oliver, PharmD  Medication Therapy Management Pharmacist  [facility] - Monday and " Wednesday 7:30 - 4:00

## 2025-06-04 NOTE — TELEPHONE ENCOUNTER
Pt picked up meds from pharmacy and is missing Trulicity.  Advised pt this was sent to pharmacy with the other meds.  Pt agrees to call pharmacy and speak to pharmacist to get prescription and let us know if further concerns.    Homa Gomez RN, BSN  [facility]

## 2025-06-04 NOTE — Clinical Note
Just cc'ing you since he is seeing your tomorrow. I just stopped his chlorthalidone today with low blood pressure and dizziness. He is also seeing you for shoulder pain. He has been having dizziness for a while so not sure if we need to rule anything else out. He did mention spinning feeling but he said Dr. Alvarez told him it was not vertigo. It certainly could be blood pressure meds causing it.   Patricia Oliver

## 2025-06-04 NOTE — PROGRESS NOTES
Medication Therapy Management (MTM) Encounter    ASSESSMENT:                            Diabetes:   Patient is not meeting A1c goal of < 7% and increased significantly since stopping Trulicity. He seemed to tolerate so will restart this at lowest dose and plan to titrate every 4 weeks if he tolerates. If he starts to have dry heaving again, will try switching to Mounjaro. He would also benefit from using a continuous glucose monitor. Educated him and ordered today. He would benefit from Microalbumin is at goal <30 mg/g. Taking lisinopril at max dose.     Hypertension:   Patient is meeting blood pressure goal of < 140/90mmHg and on lower end with dizziness even after last visit decrease in chlorthalidone.  He would benefit from stopping chlorthalidone today and will follow-up with blood pressure check.     Hyperlipidemia   Patient would benefit from restarting atorvastatin, refill sent today. If LDL remains over 100 recommend increasing.    PLAN:                            Schedule diabetes eye exam.  Schedule with Dr. Alvarez but if you can get in to see him soon then scheduled with Misti our extender provider to discuss your concerns about dizziness and shoulders.  Start using the Freestyle Dillon 3 continuous glucose monitor. Here is a website with training videos - https://www.[employer].abbott/us-en/freestyle-dillon-3-resources.html  If you have any issues with the sensor falling off or error messages you can call [employer] customer service [phone]  Start checking blood sugars at least once a day fasting in the morning if you are not able to get the freestyle continuous glucose monitor.  Call [phone] to schedule diabetes education visit to learn more about what foods increase blood sugars.   Reduce portion sizes of higher carb foods like pasta, rice, cereal, snack foods like crackers, popcorn, chips etc.    Restart Trulicity at 0.75 mg once weekly. Start as soon as possible.  Cut out orange juice and try to  cut back on milk  Restart the atorvastatin, that one helps lower your bad cholesterol.   Stop chlorthalidone     Follow-up: Return in 19 days (on 6/23/2025).    SUBJECTIVE/OBJECTIVE:                          Johan Cole is a 59 year old male seen for a follow-up visit from 1/15/25.       Reason for visit: follow-up diabetes.     Allergies/ADRs: Reviewed in chart  Past Medical History: Reviewed in chart  Tobacco: He reports that he has never smoked. His smokeless tobacco use includes chew. Not interested in quitting.   Alcohol: not currently using  Fell 2-3 years ago and shoulder still hurts.     Medication Adherence/Access: might mess meds once a week.    Diabetes   Metformin 1000 mg ER twice daily with food - switched to ER at last visit.  Lisinopril 40 mg once daily  Aspirin 81mg daily    Medication History: was on Ozempic but had vomiting on 0.5 mg. He did not have issues on the 0.25 mg dose. When he was on Trulicity 0.75 mg he did have dry heaving twice or three times a week only in the morning before eating anything. He stopped Trulicity in Sept 2024 prior to knee surgery and never restarted.    Patient reports having some diarrhea and it varies from week to week. He is not sure if it has decreased since switching to metformin ER.     Blood sugar monitoring: using glucose monitor     AM fasting: not checking    From last MTM visit  AM fasting: has not been checking    Current diabetes symptoms: none and reports not low blood sugars, lowest may have been 120    Diet/Exercise: Reports he doesn't eat much. He has something to eat twice a day. Only eats when he gets hungry. Yesterday he had 4 pieces of toast with honey. He does like milk and will have a gallon in about a week. He also like ice cream and will have a bowl twice a week. Does not drink regular pop. He will have noodles maybe once a week but has cut back in the last year.       Eye exam in the last 12 months? No  Foot exam: due    Hypertension    Lisinopril 40 mg once daily   Chlorthalidone 12.5 mg daily - decreased at last [facility] visit    Medication history: was taking amlodipine 5 mg daily but it was discontinued at primary care provider visit on 3/7/24 but patient says he continued until he ran out about a month ago.    Patient reports he still gets dizzy spells all the time. He doesn't feel that decreasing the chlorthalidone back in January helped.       He is only using acetaminophen for pain, denies using NSAIDs.    Patient does not self-monitor blood pressure.           Hyperlipidemia   Atorvastatin 40 mg daily - out for about a week due to no refills.    Patient reports no significant myalgias or other side effects.           Today's Vitals: BP 91/59   Pulse 104   Wt 239 lb 4.8 oz (108.5 kg)   SpO2 95%   BMI 38.62 kg/m    ----------------      I spent 40 minutes with this patient today. All changes were made via collaborative practice agreement with Ap Alvarez MD.     A summary of these recommendations was given to the patient.    Patricia Oliver, PharmD  Medication Therapy Management Pharmacist     Medication Therapy Recommendations  Benign essential hypertension   1 Current Medication: chlorthalidone (HYGROTON) 25 MG tablet (Discontinued)   Current Medication Sig: Take 0.5 tablets (12.5 mg) by mouth daily.   Rationale: Undesirable effect - Adverse medication event - Safety   Recommendation: Discontinue Medication   Status: Accepted per CPA   Identified Date: 6/4/2025 Completed Date: 6/4/2025         Type 2 diabetes mellitus with hyperglycemia, with long-term current use of insulin (H)   1 Current Medication: dulaglutide (TRULICITY) 0.75 MG/0.5ML pen   Current Medication Sig: Inject 0.75 mg subcutaneously every 7 days.   Rationale: Medication requires monitoring - Needs additional monitoring - Effectiveness   Recommendation: Self-Monitoring - FreeStyle Dillon 3 Plus Sensor Misc   Status: Accepted per CPA   Identified Date: 6/4/2025 Completed  Date: 6/4/2025         2 Current Medication: metFORMIN (GLUCOPHAGE XR) 500 MG 24 hr tablet   Current Medication Sig: TAKE 2 TABLETS BY MOUTH TWICE DAILY WITH MEALS   Rationale: Synergistic therapy - Needs additional medication therapy - Indication   Recommendation: Start Medication - Trulicity 0.75 MG/0.5ML Soaj   Status: Accepted per CPA   Identified Date: 6/4/2025 Completed Date: 6/4/2025

## 2025-06-05 ENCOUNTER — PATIENT OUTREACH (OUTPATIENT)
Dept: CARE COORDINATION | Facility: CLINIC | Age: 60
End: 2025-06-05

## 2025-06-05 ENCOUNTER — OFFICE VISIT (OUTPATIENT)
Dept: FAMILY MEDICINE | Facility: CLINIC | Age: 60
End: 2025-06-05
Payer: COMMERCIAL

## 2025-06-05 ENCOUNTER — ANCILLARY PROCEDURE (OUTPATIENT)
Dept: GENERAL RADIOLOGY | Facility: CLINIC | Age: 60
End: 2025-06-05
Attending: NURSE PRACTITIONER
Payer: COMMERCIAL

## 2025-06-05 VITALS
HEART RATE: 110 BPM | RESPIRATION RATE: 16 BRPM | OXYGEN SATURATION: 98 % | WEIGHT: 241 LBS | SYSTOLIC BLOOD PRESSURE: 129 MMHG | BODY MASS INDEX: 38.73 KG/M2 | DIASTOLIC BLOOD PRESSURE: 84 MMHG | HEIGHT: 66 IN

## 2025-06-05 DIAGNOSIS — M25.511 CHRONIC PAIN OF BOTH SHOULDERS: ICD-10-CM

## 2025-06-05 DIAGNOSIS — M25.512 CHRONIC PAIN OF BOTH SHOULDERS: ICD-10-CM

## 2025-06-05 DIAGNOSIS — G89.29 CHRONIC PAIN OF BOTH SHOULDERS: ICD-10-CM

## 2025-06-05 DIAGNOSIS — M25.511 CHRONIC PAIN OF BOTH SHOULDERS: Primary | ICD-10-CM

## 2025-06-05 DIAGNOSIS — R42 DIZZINESS: ICD-10-CM

## 2025-06-05 DIAGNOSIS — G89.29 CHRONIC PAIN OF BOTH SHOULDERS: Primary | ICD-10-CM

## 2025-06-05 DIAGNOSIS — Z79.4 TYPE 2 DIABETES MELLITUS WITH HYPERGLYCEMIA, WITH LONG-TERM CURRENT USE OF INSULIN (H): ICD-10-CM

## 2025-06-05 DIAGNOSIS — E03.9 ACQUIRED HYPOTHYROIDISM: ICD-10-CM

## 2025-06-05 DIAGNOSIS — M25.512 CHRONIC PAIN OF BOTH SHOULDERS: Primary | ICD-10-CM

## 2025-06-05 DIAGNOSIS — E11.65 TYPE 2 DIABETES MELLITUS WITH HYPERGLYCEMIA, WITH LONG-TERM CURRENT USE OF INSULIN (H): ICD-10-CM

## 2025-06-05 LAB
ERYTHROCYTE [DISTWIDTH] IN BLOOD BY AUTOMATED COUNT: 14.5 % (ref 10–15)
HCT VFR BLD AUTO: 40.9 % (ref 40–53)
HGB BLD-MCNC: 13.6 G/DL (ref 13.3–17.7)
MCH RBC QN AUTO: 27.3 PG (ref 26.5–33)
MCHC RBC AUTO-ENTMCNC: 33.3 G/DL (ref 31.5–36.5)
MCV RBC AUTO: 82 FL (ref 78–100)
PLATELET # BLD AUTO: 350 10E3/UL (ref 150–450)
RBC # BLD AUTO: 4.99 10E6/UL (ref 4.4–5.9)
WBC # BLD AUTO: 13 10E3/UL (ref 4–11)

## 2025-06-05 RX ORDER — LIRAGLUTIDE 6 MG/ML
INJECTION SUBCUTANEOUS
Qty: 18 ML | Refills: 0 | Status: SHIPPED | OUTPATIENT
Start: 2025-06-05 | End: 2025-09-10

## 2025-06-05 ASSESSMENT — ENCOUNTER SYMPTOMS
SHORTNESS OF BREATH: 0
CARDIOVASCULAR NEGATIVE: 1
COUGH: 0
DIZZINESS: 1
WHEEZING: 0
ARTHRALGIAS: 1
GASTROINTESTINAL NEGATIVE: 1
CHILLS: 0
HEADACHES: 0
NECK PAIN: 0
FEVER: 0
NUMBNESS: 0

## 2025-06-05 ASSESSMENT — PAIN SCALES - GENERAL: PAINLEVEL_OUTOF10: SEVERE PAIN (8)

## 2025-06-05 NOTE — PATIENT INSTRUCTIONS
Shoulder x-rays today. I will notify you of results.    Referral to physical therapy and orthopedics.    Labs today. Follow up as scheduled.    Start Victoza as Trulicity not covered by insurance. Let us know if any issues with this.

## 2025-06-05 NOTE — PROGRESS NOTES
"  Assessment & Plan     Chronic pain of both shoulders  Chronic shoulder pain after a fall 3 years ago. X-rays today. Negative drop arm test. Recommend starting with PT. Referral to orthopedics to follow up if not improving with PT. Return precautions discussed.  - XR Shoulder Left G/E 3 Views; Future  - XR Shoulder Right G/E 3 Views; Future  - Physical Therapy  Referral; Future  - Orthopedic  Referral; Future    Dizziness  Likely due to low BP from chlorthalidone. Last dose was yesterday. BP improved today. TSH last Oct was a little low. Takes levothyroxine. Also has mildly low hemoglobin last Oct. Will recheck labs today. Follow up pending results/symptoms.  - Basic metabolic panel  (Ca, Cl, CO2, Creat, Gluc, K, Na, BUN); Future  - TSH with free T4 reflex; Future  - CBC with platelets; Future    Acquired hypothyroidism  See above.  - TSH with free T4 reflex; Future    Type 2 diabetes mellitus with hyperglycemia, with long-term current use of insulin (H)  Trulicity not covered by insurance. Reviewed and discussed notice from insurance with Pt. He would like to try Victoza. Safe use and side effects reviewed. Follow up with MTM as scheduled.  - liraglutide (VICTOZA) 18 MG/3ML solution; Inject 0.6 mg subcutaneously daily for 7 days, THEN 1.2 mg daily.      BMI  Estimated body mass index is 39.49 kg/m  as calculated from the following:    Height as of this encounter: 1.664 m (5' 5.5\").    Weight as of this encounter: 109.3 kg (241 lb).   Weight management plan: Patient was referred to their PCP to discuss a diet and exercise plan.    Future Appointments   Date Time Provider Department Center   6/23/2025  1:30 PM [facility]ado is a 59 year old, presenting for the following health issues:  Shoulder Pain and Dizziness      6/5/2025    11:17 AM   Additional Questions   Roomed by JEANETTE     Via the Health Maintenance questionnaire, the patient has reported " the following services have been completed -Eye Exam: [facility] 2024-05-23, this information has been sent to the abstraction team.  Shoulder Pain  Associated symptoms include arthralgias. Pertinent negatives include no chills, coughing, fever, headaches, neck pain or numbness.   Dizziness  Associated symptoms include arthralgias. Pertinent negatives include no chills, coughing, fever, headaches, neck pain or numbness.   History of Present Illness       Reason for visit:  My shoulders  Symptom onset:  More than a month  Symptoms include:  Shoulders in pain  Symptom intensity:  Severe  Symptom progression:  Worsening  Had these symptoms before:  Yes  Has tried/received treatment for these symptoms:  No  What makes it worse:  Lifhing  What makes it better:  Alevebut only last 15minutes   He is taking medications regularly.      Here today to discuss bilateral shoulder pain. States he fell in his parents' shower 3 years ago. Caught himself on the edge by extending his arms. Had shoulder pain after that. Has not seen anyone for this before. Has not had treatments. Symptoms have not worsened or improved. Reports poor ROM due to pain.     Also reports ongoing concerns about feeling dizzy and off balance when he first gets up. Does better after he gets up and moving. Saw MTM yesterday who stopped his chlorthalidone for low BP. Questioning if this was a factor with dizziness.   MTM restarted Trulicity yesterday due to Pt not meeting goal A1C. PCP messaged me today that this was denied by insurance. Must try Victoza, Byetta or Bydureon first.   Last dose of chlorthalidone was yesterday morning.       Review of Systems   Constitutional:  Negative for chills and fever.   HENT: Negative.     Eyes:  Negative for visual disturbance.   Respiratory:  Negative for cough, shortness of breath and wheezing.    Cardiovascular: Negative.    Gastrointestinal: Negative.    Musculoskeletal:  Positive for arthralgias. Negative for neck  "pain.   Skin: Negative.    Neurological:  Positive for dizziness. Negative for numbness and headaches.           Objective    /84 (BP Location: Right arm, Patient Position: Sitting, Cuff Size: Adult Large)   Pulse 110   Resp 16   Ht 1.664 m (5' 5.5\")   Wt 109.3 kg (241 lb)   SpO2 98%   BMI 39.49 kg/m    Body mass index is 39.49 kg/m .  Physical Exam  Vitals and nursing note reviewed.   Constitutional:       Appearance: Normal appearance.   HENT:      Head: Normocephalic.      Nose: Nose normal.      Mouth/Throat:      Mouth: Mucous membranes are moist.      Pharynx: Oropharynx is clear.   Eyes:      Extraocular Movements: Extraocular movements intact.      Conjunctiva/sclera: Conjunctivae normal.      Pupils: Pupils are equal, round, and reactive to light.   Cardiovascular:      Rate and Rhythm: Normal rate and regular rhythm.      Pulses: Normal pulses.      Heart sounds: Normal heart sounds.   Pulmonary:      Effort: Pulmonary effort is normal.      Breath sounds: Normal breath sounds.   Musculoskeletal:      Right shoulder: Tenderness present. No swelling, deformity, effusion or crepitus. Decreased range of motion. Normal pulse.      Left shoulder: Tenderness present. No swelling, deformity, effusion or crepitus. Decreased range of motion. Normal pulse.      Cervical back: Normal.      Thoracic back: Normal.      Comments: Generalized tenderness to bilateral shoulders.  Drop arm test negative. Limited ROM to extension and abduction. Empty can test neg.    Skin:     General: Skin is warm and dry.   Neurological:      General: No focal deficit present.      Mental Status: He is alert and oriented to person, place, and time.      Cranial Nerves: No cranial nerve deficit.      Deep Tendon Reflexes: Reflexes normal.   Psychiatric:         Mood and Affect: Mood normal.         Behavior: Behavior normal.              Signed Electronically by: HARMAN Andrea CNP    "

## 2025-06-06 ENCOUNTER — RESULTS FOLLOW-UP (OUTPATIENT)
Dept: FAMILY MEDICINE | Facility: CLINIC | Age: 60
End: 2025-06-06
Payer: COMMERCIAL

## 2025-06-06 DIAGNOSIS — E87.1 HYPONATREMIA: Primary | ICD-10-CM

## 2025-06-06 DIAGNOSIS — D72.829 LEUKOCYTOSIS, UNSPECIFIED TYPE: ICD-10-CM

## 2025-06-06 DIAGNOSIS — R79.89 ELEVATED SERUM CREATININE: ICD-10-CM

## 2025-06-09 ENCOUNTER — PATIENT OUTREACH (OUTPATIENT)
Dept: CARE COORDINATION | Facility: CLINIC | Age: 60
End: 2025-06-09
Payer: COMMERCIAL

## 2025-06-09 ENCOUNTER — TELEPHONE (OUTPATIENT)
Dept: NURSING | Facility: CLINIC | Age: 60
End: 2025-06-09
Payer: COMMERCIAL

## 2025-06-09 DIAGNOSIS — E11.65 TYPE 2 DIABETES MELLITUS WITH HYPERGLYCEMIA, WITH LONG-TERM CURRENT USE OF INSULIN (H): Primary | ICD-10-CM

## 2025-06-09 DIAGNOSIS — Z79.4 TYPE 2 DIABETES MELLITUS WITH HYPERGLYCEMIA, WITH LONG-TERM CURRENT USE OF INSULIN (H): Primary | ICD-10-CM

## 2025-06-09 NOTE — TELEPHONE ENCOUNTER
Rn called patient to inform that Rx was sent for needles. Patient verbalized understanding.     Regi Lira RN   [facility]

## 2025-06-09 NOTE — TELEPHONE ENCOUNTER
RN called patient and reviewed results and provider recommendations. He verbalized understanding and denies questions. Provided ortho referral scheduling phone number.     Regi Lira RN   [facility]

## 2025-06-09 NOTE — TELEPHONE ENCOUNTER
Patient plans to start Victoza today and inquires whether needles are included with the pen. States no needles were provided; only received 4 boxes containing 2 pens each from the pharmacy. FNA advised that pens are sold separately.     To Care team: kindly send Rx for pen needles to [facility]. Please call pt to update [phone]    Traci Willingham RN/[facility] Nurse Advisor

## 2025-06-23 ENCOUNTER — TELEPHONE (OUTPATIENT)
Dept: ORTHOPEDICS | Facility: CLINIC | Age: 60
End: 2025-06-23

## 2025-06-23 ENCOUNTER — OFFICE VISIT (OUTPATIENT)
Dept: PHARMACY | Facility: CLINIC | Age: 60
End: 2025-06-23
Payer: COMMERCIAL

## 2025-06-23 ENCOUNTER — LAB (OUTPATIENT)
Dept: LAB | Facility: CLINIC | Age: 60
End: 2025-06-23
Payer: COMMERCIAL

## 2025-06-23 VITALS
WEIGHT: 244 LBS | OXYGEN SATURATION: 95 % | SYSTOLIC BLOOD PRESSURE: 127 MMHG | BODY MASS INDEX: 39.99 KG/M2 | DIASTOLIC BLOOD PRESSURE: 82 MMHG | HEART RATE: 78 BPM

## 2025-06-23 DIAGNOSIS — Z79.4 TYPE 2 DIABETES MELLITUS WITH HYPERGLYCEMIA, WITH LONG-TERM CURRENT USE OF INSULIN (H): Primary | ICD-10-CM

## 2025-06-23 DIAGNOSIS — E87.1 HYPONATREMIA: ICD-10-CM

## 2025-06-23 DIAGNOSIS — I10 BENIGN ESSENTIAL HYPERTENSION: ICD-10-CM

## 2025-06-23 DIAGNOSIS — M19.011 PRIMARY OSTEOARTHRITIS OF SHOULDERS, BILATERAL: ICD-10-CM

## 2025-06-23 DIAGNOSIS — M25.511 CHRONIC PAIN OF BOTH SHOULDERS: Primary | ICD-10-CM

## 2025-06-23 DIAGNOSIS — E11.65 TYPE 2 DIABETES MELLITUS WITH HYPERGLYCEMIA, WITH LONG-TERM CURRENT USE OF INSULIN (H): Primary | ICD-10-CM

## 2025-06-23 DIAGNOSIS — Z79.4 TYPE 2 DIABETES MELLITUS WITH HYPERGLYCEMIA, WITH LONG-TERM CURRENT USE OF INSULIN (H): ICD-10-CM

## 2025-06-23 DIAGNOSIS — M19.012 PRIMARY OSTEOARTHRITIS OF SHOULDERS, BILATERAL: ICD-10-CM

## 2025-06-23 DIAGNOSIS — M25.512 CHRONIC PAIN OF BOTH SHOULDERS: Primary | ICD-10-CM

## 2025-06-23 DIAGNOSIS — G89.29 CHRONIC PAIN OF BOTH SHOULDERS: Primary | ICD-10-CM

## 2025-06-23 DIAGNOSIS — R79.89 ELEVATED SERUM CREATININE: ICD-10-CM

## 2025-06-23 DIAGNOSIS — D72.829 LEUKOCYTOSIS, UNSPECIFIED TYPE: ICD-10-CM

## 2025-06-23 DIAGNOSIS — E11.65 TYPE 2 DIABETES MELLITUS WITH HYPERGLYCEMIA, WITH LONG-TERM CURRENT USE OF INSULIN (H): ICD-10-CM

## 2025-06-23 LAB
BASOPHILS # BLD AUTO: 0.1 10E3/UL (ref 0–0.2)
BASOPHILS NFR BLD AUTO: 1 %
EOSINOPHIL # BLD AUTO: 0.7 10E3/UL (ref 0–0.7)
EOSINOPHIL NFR BLD AUTO: 7 %
ERYTHROCYTE [DISTWIDTH] IN BLOOD BY AUTOMATED COUNT: 14.9 % (ref 10–15)
HCT VFR BLD AUTO: 37.1 % (ref 40–53)
HGB BLD-MCNC: 12.1 G/DL (ref 13.3–17.7)
IMM GRANULOCYTES # BLD: 0 10E3/UL
IMM GRANULOCYTES NFR BLD: 0 %
LYMPHOCYTES # BLD AUTO: 3.6 10E3/UL (ref 0.8–5.3)
LYMPHOCYTES NFR BLD AUTO: 36 %
MCH RBC QN AUTO: 27.4 PG (ref 26.5–33)
MCHC RBC AUTO-ENTMCNC: 32.6 G/DL (ref 31.5–36.5)
MCV RBC AUTO: 84 FL (ref 78–100)
MONOCYTES # BLD AUTO: 0.6 10E3/UL (ref 0–1.3)
MONOCYTES NFR BLD AUTO: 6 %
NEUTROPHILS # BLD AUTO: 5.1 10E3/UL (ref 1.6–8.3)
NEUTROPHILS NFR BLD AUTO: 51 %
PLATELET # BLD AUTO: 214 10E3/UL (ref 150–450)
RBC # BLD AUTO: 4.41 10E6/UL (ref 4.4–5.9)
WBC # BLD AUTO: 10 10E3/UL (ref 4–11)

## 2025-06-23 PROCEDURE — 99607 MTMS BY PHARM ADDL 15 MIN: CPT | Performed by: PHARMACIST

## 2025-06-23 PROCEDURE — 3074F SYST BP LT 130 MM HG: CPT | Performed by: PHARMACIST

## 2025-06-23 PROCEDURE — 85025 COMPLETE CBC W/AUTO DIFF WBC: CPT

## 2025-06-23 PROCEDURE — 80048 BASIC METABOLIC PNL TOTAL CA: CPT

## 2025-06-23 PROCEDURE — 99606 MTMS BY PHARM EST 15 MIN: CPT | Performed by: PHARMACIST

## 2025-06-23 PROCEDURE — 3079F DIAST BP 80-89 MM HG: CPT | Performed by: PHARMACIST

## 2025-06-23 PROCEDURE — 36415 COLL VENOUS BLD VENIPUNCTURE: CPT

## 2025-06-23 NOTE — TELEPHONE ENCOUNTER
Outbound phone call left a voicemail for the patient's sister.  Per patient instructions in clinic last week, prefers we call sister instead as first line.  Consent to communicate on file exist.    I updated the patient's sister in the voicemail letting her know that the injections we recommended are not approved by his insurance.  We recommend a pain management referral as a next option to consider other treatments until his sugars and A1c are better under control.    She can call us back if she has any questions or send us a [employer] message.    Pain management referral placed.  Scheduling instructions given.      Mata Otero, ATC

## 2025-06-23 NOTE — PATIENT INSTRUCTIONS
"Recommendations from today's MTM visit:                                                       Start checking blood sugars at least once a day fasting in the morning if you are not able to get the freestyle continuous glucose monitor.  Call [phone] to schedule diabetes education visit to learn more about what foods increase blood sugars.   Reduce portion sizes of higher carb foods like pasta, rice, cereal, snack foods like crackers, popcorn, chips etc.    Restart Trulicity at 0.75 mg ONCE A WEEK. Start as soon as possible. If you have insurance issues let me know.  Continue to not drink orange juice and try to cut back on milk.  Schedule diabetes eye exam. Call [facility] to schedule.  Start using the Freestyle Karma 3 continuous glucose monitor. Here is a website with training videos - https://www.[employer].abbott/us-en/freestyle-karma-3-resources.html  If you have any issues with the sensor falling off or error messages you can call [employer] service [phone]    [employer] Karma 3 Plus:  - wear time is 15 days (14 days for Karma 3)  - download Karma gumaro from [employer]/Play store  - Video on how to apply sensor:  https://www.youtube.com/watch?v=CRDitmOolOc    - Video on how to set up the gumaro and start sensor:  https://www.youNeocoretechube.com/watch?v=zQG3i7xMgKm        Discrepancies between your CGM and blood glucose meter:     Occasionally, discrepancies will happen, and usually the difference between the numbers is minor. When the readings are different, always trust the blood glucose value, not the CGM value. A few things to consider:     1) The first 24 hours of the sensor's life are often the most inaccurate. The sensor is still \"warming up\" and getting used to  your body's glucose variations.  2) \"Lag time\" - The sensor is reading the interstitial fluid, where a finger stick is reading the blood. Blood glucose values change more quickly than the interstitial fluid, so it sometimes takes time for the sensor " "to catch up to the blood glucose reading during fast shifts in blood glucose.  3) Compression lows. If you are laying on, or putting pressure on your sensor, the sensor may be compressed, which can cause it to read a false low value. Remove the pressure, and the discrepancy should improve.     If you still are noticing a big difference after 24 hours, then consider replacing the sensor. You can call your sensor's customer service number to discuss the issue and request a replacement.     Dillon help: [phone]    Sensor Adhesive Issues    Here is the link to the company's \"product adhesion guide\" which has some helpful tips on how to get it to stick and come off. https://www.[employer].abbott/content/dam/adc/freestyle/countries/us-en/documents/freestyle-dillon-sensor-adhesion-guide.pdf     Follow-up: Return in 4 weeks (on 7/21/2025).    It was great speaking with you today.  I value your experience and would be very thankful for your time in providing feedback in our clinic survey. In the next few days, you may receive an email or text message from [employer] with a link to a survey related to your  clinical pharmacist.\"     To schedule another MTM appointment, please call the clinic directly or you may call the MTM scheduling line at [phone] or toll-free at [phone].     My Clinical Pharmacist's contact information:                                                      Please feel free to contact me with any questions or concerns you have.      Patricia Oliver, PharmD  Medication Therapy Management Pharmacist  [facility] - Monday and Wednesday 7:30 - 4:00    "

## 2025-06-23 NOTE — PROGRESS NOTES
Medication Therapy Management (MTM) Encounter    ASSESSMENT:                            Medication Adherence/Access: No issues identified.    Diabetes:   Patient is not meeting A1c goal of < 7%. With him not tolerating even the lowest dose of Victoza will try to move to Trulicity. He has tried and failed Victoza so Trulicity should be covered. If not would try Mounjaro instead. He did tolerate Trulicity in the past fairly well but discussed possible side effects. If he starts to have dry heaving again, will try switching to Mounjaro. He would also benefit from using a continuous glucose monitor. Again educated him and provided a link with videos on how to apply the sensor. He would benefit from Microalbumin is at goal <30 mg/g. Taking lisinopril at max dose.     Hypertension:   Patient is meeting blood pressure goal of < 140/90mmHg off chlorthalidone and no longer has dizziness.  Will have him continue on lisinopril with no change. BMP still pending.    PLAN:                            Start using the Freestyle Dillon 3 continuous glucose monitor. Here is a website with training videos - https://www.Kitware/us-en/freestyle-dillon-3-resources.html  If you have any issues with the sensor falling off or error messages you can call [employer] service [phone]  Start checking blood sugars at least once a day fasting in the morning if you are not able to get the freestyle continuous glucose monitor.  Call [phone] to schedule diabetes education visit to learn more about what foods increase blood sugars.   Reduce portion sizes of higher carb foods like pasta, rice, cereal, snack foods like crackers, popcorn, chips etc.    Restart Trulicity at 0.75 mg once weekly. Start as soon as possible. If you have insurance issues let me know.  Continue to not drink orange juice and try to cut back on milk.  Schedule diabetes eye exam. Call [facility] to schedule.    Follow-up: Return in 4 weeks (on  7/21/2025).    SUBJECTIVE/OBJECTIVE:                          Johan Cole is a 59 year old male seen for a follow-up visit from 6/4/25.       Reason for visit: follow-up diabetes.    Allergies/ADRs: Reviewed in chart  Past Medical History: Reviewed in chart  Tobacco: He reports that he has never smoked. His smokeless tobacco use includes chew.  Alcohol: not currently using    Medication Adherence/Access: no issues reported.    Diabetes   Metformin 1000 mg ER twice daily with food - switched to ER at last visit.  Lisinopril 40 mg once daily  Aspirin 81mg daily  Victoza daily for a week  - only dialing a little past the prime notch on the pen so not getting much at all - restarted Trulicity at last [facility] visit but not covered by insurance so switched to Victoza. Stopped Victoza after 1 week due to vomiting.    Medication History: was on Ozempic but had vomiting on 0.5 mg. He did not have issues on the 0.25 mg dose. When he was on Trulicity 0.75 mg he did have dry heaving twice or three times a week only in the morning before eating anything. He stopped Trulicity in Sept 2024 prior to knee surgery and never restarted.    Patient reports when he started the Victoza he started vomiting daily until he stopped using it. Reports no diarrhea.    Blood sugar monitoring: continuous glucose monitor - but has not started yet    Not checking     Current diabetes symptoms: none    Diet/Exercise: Reports not drinking juice and only diet pop. Eating lunch and dinner most days.  He may sometimes have pasta.        Eye exam in the last 12 months? No  Foot exam: due    Hypertension   Lisinopril 40 mg once daily     Medication history: was taking amlodipine 5 mg daily but it was discontinued at primary care provider visit on 3/7/24 but patient says he continued until he ran out about a month ago. Chlorthalidone 12.5 mg daily stopped 6/2025 due to dizziness.    Patient reports no dizziness since stopping chlorthalidone.      Patient does  not self-monitor blood pressure.           Today's Vitals: /82   Pulse 78   Wt 244 lb (110.7 kg)   SpO2 95%   BMI 39.99 kg/m    ----------------      I spent 20 minutes with this patient today. All changes were made via collaborative practice agreement with Ap Alvarez MD.     A summary of these recommendations was given to the patient and mailed to sister per patient request.    Patricia Oliver, PharmD  Medication Therapy Management Pharmacist       Medication Therapy Recommendations  Type 2 diabetes mellitus with hyperglycemia, with long-term current use of insulin (H)   1 Current Medication: liraglutide (VICTOZA) 18 MG/3ML solution (Discontinued)   Current Medication Sig: Inject 0.6 mg subcutaneously daily for 7 days, THEN 1.2 mg daily.   Rationale: Undesirable effect - Adverse medication event - Safety   Recommendation: Change Medication - Trulicity 0.75 MG/0.5ML Soaj   Status: Accepted per CPA   Identified Date: 6/23/2025 Completed Date: 6/23/2025

## 2025-06-24 ENCOUNTER — RESULTS FOLLOW-UP (OUTPATIENT)
Dept: FAMILY MEDICINE | Facility: CLINIC | Age: 60
End: 2025-06-24

## 2025-06-24 ENCOUNTER — TRANSFERRED RECORDS (OUTPATIENT)
Dept: FAMILY MEDICINE | Facility: CLINIC | Age: 60
End: 2025-06-24
Payer: COMMERCIAL

## 2025-06-24 ENCOUNTER — TELEPHONE (OUTPATIENT)
Dept: FAMILY MEDICINE | Facility: CLINIC | Age: 60
End: 2025-06-24
Payer: COMMERCIAL

## 2025-06-24 ENCOUNTER — PATIENT OUTREACH (OUTPATIENT)
Dept: CARE COORDINATION | Facility: CLINIC | Age: 60
End: 2025-06-24
Payer: COMMERCIAL

## 2025-06-24 LAB
ANION GAP SERPL CALCULATED.3IONS-SCNC: 10 MMOL/L (ref 7–15)
BUN SERPL-MCNC: 14.2 MG/DL (ref 6–20)
CALCIUM SERPL-MCNC: 9.2 MG/DL (ref 8.8–10.4)
CHLORIDE SERPL-SCNC: 110 MMOL/L (ref 98–107)
CREAT SERPL-MCNC: 1.18 MG/DL (ref 0.67–1.17)
EGFRCR SERPLBLD CKD-EPI 2021: 72 ML/MIN/1.73M2
GLUCOSE SERPL-MCNC: 158 MG/DL (ref 70–99)
HCO3 SERPL-SCNC: 20 MMOL/L (ref 22–29)
POTASSIUM SERPL-SCNC: 4.6 MMOL/L (ref 3.4–5.3)
SODIUM SERPL-SCNC: 140 MMOL/L (ref 135–145)

## 2025-06-24 NOTE — LETTER
July 3, 2025    To:   [employer]  Phone [phone]   Fax [phone]     RE: Johan Cole  [address] [address]  : 1965  MRN: [id number]      To Whom It May Concern,    I am writing to request prior authorization for Trulicity (dulaglutide) for my patient, Johan Cole, who has been diagnosed with type 2 diabetes mellitus. This request is based on the patient's current medical condition and previous treatment history.    Johan Cole has been under my care for the management of type 2 diabetes. Despite adherence to lifestyle modifications and pharmacotherapy, the patient's glycemic control remains suboptimal, with a recent HbA1c level of 13.9%. This level indicates a significant need for an effective therapeutic intervention to reduce the risk of diabetes-related complications.    The patient has previously been treated with Victoza (liraglutide). He started Victoza in 2025 and discontinued after a week. Unfortunately, this medication was discontinued due to severe gastrointestinal side effects, specifically daily vomiting, which significantly impacted the patient's quality of life and adherence to the treatment regimen.    Given the patient's intolerance to Victoza and the need for improved glycemic control, I believe that Trulicity, a once-weekly GLP-1 receptor agonist, would be a suitable alternative. Trulicity's dosing schedule may also enhance compliance and provide better management of the patient's blood glucose levels.    Please feel free to contact me at [phone] should you require any additional information or clarification regarding this request. Thank you for your attention to this matter and for considering this prior authorization request.    Sincerely,    Ap Alvarez MD

## 2025-06-24 NOTE — TELEPHONE ENCOUNTER
Covering for Patricia who is out of office.     Based on chart notes, Trulicity was ordered in June, PA was completed and denied due to needing to trial Victoza. Patient then tried Victoza but was not able to tolerate.     New order was sent for Trulicity at last MT visit but ePA was cancelled as a duplicate due to prior denial.     Patient has now tried and did not tolerate the Victoza so new PA for the Trulicity 0.75mg weekly is needed to submit given the new clinical scenario.    Desirae Oleary, Pharm.D, [facility]  Medication Therapy Management Pharmacist  [phone]

## 2025-06-24 NOTE — TELEPHONE ENCOUNTER
Johan Cole ([birth year]OB[birth year]) [phone] - Asked for Patricia Reza to call - he is having trouble obtaining his Rx and wanted to discuss with you.    Esther Overton

## 2025-06-26 ENCOUNTER — PATIENT OUTREACH (OUTPATIENT)
Dept: CARE COORDINATION | Facility: CLINIC | Age: 60
End: 2025-06-26
Payer: COMMERCIAL

## 2025-06-26 NOTE — TELEPHONE ENCOUNTER
Prior Authorization Not Allowed per Insurance, PA Denial on File for Requested Medication    Medication: dulaglutide (TRULICITY) 0.75 MG/0.5ML pen -PA NOT ALLOWED/DENIED   Insurance Company: [employer] Non-Specialty PA's - Phone [phone] Fax [phone]  Expected CoPay:      Pharmacy Filling the Rx: [id number] - [place], [place] - [address]  Pharmacy Notified:  No  Patient Notified:  No    Unable to re initiate PA Request due to Prior PA Denial on File for Requested medication. Next step is to Appeal with indication that patient tried/failed Victoza and any other info that provider/clinic staff would like to provide. See TE from 6/4/2025 for additional info and Appeal processing.

## 2025-06-30 NOTE — TELEPHONE ENCOUNTER
Routing to primary care provider to start the appeal. The PA for Trulicity was completed and denied due to needing to trial Victoza. Patient then tried Victoza but was not able to tolerate due to persistent vomiting.     Patricia Oliver, PharmD  Medication Therapy Management Pharmacist

## 2025-07-03 NOTE — TELEPHONE ENCOUNTER
Joaquin Alvarez. I have pended a letter for your review since Patricia is out of office and I'm not familiar with this patient. If you are satisfied with the letter, please finalize and route this encounter to the [facility] PA MED and ask them to appeal this decision. They will use your letter to appeal the PA.     Cristina Che, NathalieD, WINIFRED, BCACP  MTM Pharmacist, [facility]

## 2025-07-08 NOTE — TELEPHONE ENCOUNTER
MEDICATION APPEAL APPROVED    Medication: TRULICITY 0.75 MG/0.5ML SC SOAJ  Authorization Effective Date:  06/08/25  Authorization Expiration Date:  07/08/26  Approved Dose/Quantity: NA  Reference #:     Appeal Insurance Company: [employer]  Expected CoPay: $       CoPay Card Available:    Financial Assistance Needed: NA  Filling Pharmacy: [id number] - [place], [place] - [address]  Patient Notified: Pharmacy will contact pt when filled  Comments:

## 2025-07-21 ENCOUNTER — OFFICE VISIT (OUTPATIENT)
Dept: PHARMACY | Facility: CLINIC | Age: 60
End: 2025-07-21
Payer: COMMERCIAL

## 2025-07-21 VITALS
BODY MASS INDEX: 38.56 KG/M2 | SYSTOLIC BLOOD PRESSURE: 128 MMHG | OXYGEN SATURATION: 95 % | DIASTOLIC BLOOD PRESSURE: 83 MMHG | WEIGHT: 235.3 LBS | HEART RATE: 80 BPM

## 2025-07-21 DIAGNOSIS — E11.65 TYPE 2 DIABETES MELLITUS WITH HYPERGLYCEMIA, WITH LONG-TERM CURRENT USE OF INSULIN (H): Primary | ICD-10-CM

## 2025-07-21 DIAGNOSIS — Z79.4 TYPE 2 DIABETES MELLITUS WITH HYPERGLYCEMIA, WITH LONG-TERM CURRENT USE OF INSULIN (H): Primary | ICD-10-CM

## 2025-07-21 PROCEDURE — 99606 MTMS BY PHARM EST 15 MIN: CPT | Performed by: PHARMACIST

## 2025-07-21 PROCEDURE — 3074F SYST BP LT 130 MM HG: CPT | Performed by: PHARMACIST

## 2025-07-21 PROCEDURE — 3079F DIAST BP 80-89 MM HG: CPT | Performed by: PHARMACIST

## 2025-07-21 NOTE — PATIENT INSTRUCTIONS
"Recommendations from today's MTM visit:                                                       Continue medicine with no change. Reach out if you have more vomiting.  Schedule with diabetes education      Freestyle Dillon 3 Plus:  - wear time is 15 days (14 days for Dillon 3)  - download Dillon gumaro from [employer]/Play store  - Video on how to apply sensor:  https://www.Wynlinkube.com/watch?v=CRDitmOolOc    - Video on how to set up the gumaro and start sensor:  https://www.youJelliube.com/watch?v=sJR7i0gXoMd        Discrepancies between your CGM and blood glucose meter:     Occasionally, discrepancies will happen, and usually the difference between the numbers is minor. When the readings are different, always trust the blood glucose value, not the CGM value. A few things to consider:     1) The first 24 hours of the sensor's life are often the most inaccurate. The sensor is still \"warming up\" and getting used to  your body's glucose variations.  2) \"Lag time\" - The sensor is reading the interstitial fluid, where a finger stick is reading the blood. Blood glucose values change more quickly than the interstitial fluid, so it sometimes takes time for the sensor to catch up to the blood glucose reading during fast shifts in blood glucose.  3) Compression lows. If you are laying on, or putting pressure on your sensor, the sensor may be compressed, which can cause it to read a false low value. Remove the pressure, and the discrepancy should improve.     If you still are noticing a big difference after 24 hours, then consider replacing the sensor. You can call your sensor's customer service number to discuss the issue and request a replacement.     Dillon help: [phone]    Sensor Adhesive Issues    Here is the link to the company's \"product adhesion guide\" which has some helpful tips on how to get it to stick and come off. " "https://www.freestyle.abbott/content/dam/adc/freestyle/countries/us-en/documents/freestyle-karma-sensor-adhesion-guide.pdf     Follow-up: Return in 3 weeks (on 8/11/2025).    It was great speaking with you today.  I value your experience and would be very thankful for your time in providing feedback in our clinic survey. In the next few days, you may receive an email or text message from [employer] with a link to a survey related to your  clinical pharmacist.\"     To schedule another MTM appointment, please call the clinic directly or you may call the MTM scheduling line at [phone] or toll-free at [phone].     My Clinical Pharmacist's contact information:                                                      Please feel free to contact me with any questions or concerns you have.      Patricia Oliver, PharmD  Medication Therapy Management Pharmacist  [facility] - Monday and Wednesday 7:30 - 4:00    "

## 2025-07-21 NOTE — PROGRESS NOTES
Medication Therapy Management (MTM) Encounter    ASSESSMENT:                            Medication Adherence/Access: No issues identified.    Diabetes:   Patient is not meeting A1c goal of < 7%. With him just starting Trulicity last week will not make medicine changes today. Helped him place his first Libre3 sensor today. Encouraged him to continue to work on lifestyle modifications and order diabetes education referral today to further assist. With him having one episode of vomiting, educated him that it might help to have something small in his stomach if he starts to feel nauseous in the morning again. I did ask him to reach out if this worsens. If he is not able to tolerate Trulicity, will try switching to Mounjaro.  Microalbumin is at goal <30 mg/g. Taking lisinopril at max dose.     PLAN:                            Continue medicine with no change. Reach out if you have more vomiting.  Schedule with diabetes education  Placed first Libre3 sensor today and provided education in AVS.    Follow-up: Return in 3 weeks (on 8/11/2025).    SUBJECTIVE/OBJECTIVE:                          Johan Cole is a 59 year old male seen for a follow-up visit. Patient was accompanied by his sister Lynsey.      Reason for visit: diabetes follow-up.    Allergies/ADRs: Reviewed in chart  Past Medical History: Reviewed in chart  Tobacco: He reports that he has never smoked. His smokeless tobacco use includes chew.  Alcohol: not currently using    Medication Adherence/Access: no issues reported.    Diabetes   Metformin 1000 mg ER twice daily with food   Lisinopril 40 mg once daily  Aspirin 81mg daily  Trulicity 0.75 mg once weekly on Thursdays - started last week    Medication History: was on Ozempic but had vomiting on 0.5 mg. He did not have issues on the 0.25 mg dose. When he was on Trulicity 0.75 mg he did have dry heaving twice or three times a week only in the morning before eating anything. He stopped Trulicity in Sept 2024 prior  to knee surgery and never restarted. Stopped Victoza stopped due to vomiting.     Patient reports since starting Trulicity he had one episode of vomiting in the morning with empty stomach. He had not eaten or taken pills yes. Reports no diarrhea.     Blood sugar monitoring: continuous glucose monitor - but has not started yet but brought today.    Not checking     Current diabetes symptoms: none    Diet/Exercise: Reports feels less hungry. He had been drinking a lot of ice tea but stopped that about a month ago. Switched from regular ginger ale to zero. Eating lunch and dinner most days.  He may sometimes have pasta.        Eye exam is up to date  Foot exam: due      Today's Vitals: /83   Pulse 80   Wt 235 lb 4.8 oz (106.7 kg)   SpO2 95%   BMI 38.56 kg/m    ----------------    I spent 40 minutes with this patient today. All changes were made via collaborative practice agreement with Ap Alvarez MD.     A summary of these recommendations was given to the patient.    Patricia Oliver, PharmD  Medication Therapy Management Pharmacist     Medication Therapy Recommendations  No medication therapy recommendations to display

## 2025-07-22 ENCOUNTER — PATIENT OUTREACH (OUTPATIENT)
Dept: CARE COORDINATION | Facility: CLINIC | Age: 60
End: 2025-07-22
Payer: COMMERCIAL

## 2025-07-24 ENCOUNTER — PATIENT OUTREACH (OUTPATIENT)
Dept: CARE COORDINATION | Facility: CLINIC | Age: 60
End: 2025-07-24
Payer: COMMERCIAL

## 2025-08-04 DIAGNOSIS — E11.69 TYPE 2 DIABETES MELLITUS WITH OTHER SPECIFIED COMPLICATION, UNSPECIFIED WHETHER LONG TERM INSULIN USE (H): ICD-10-CM

## 2025-08-04 RX ORDER — METFORMIN HYDROCHLORIDE 500 MG/1
TABLET, EXTENDED RELEASE ORAL
Qty: 360 TABLET | Refills: 0 | Status: SHIPPED | OUTPATIENT
Start: 2025-08-04

## 2025-08-11 ENCOUNTER — OFFICE VISIT (OUTPATIENT)
Dept: PHARMACY | Facility: CLINIC | Age: 60
End: 2025-08-11
Payer: COMMERCIAL

## 2025-08-11 VITALS
OXYGEN SATURATION: 98 % | HEART RATE: 67 BPM | SYSTOLIC BLOOD PRESSURE: 124 MMHG | WEIGHT: 230 LBS | DIASTOLIC BLOOD PRESSURE: 83 MMHG | BODY MASS INDEX: 37.69 KG/M2

## 2025-08-11 DIAGNOSIS — E11.65 TYPE 2 DIABETES MELLITUS WITH HYPERGLYCEMIA, WITH LONG-TERM CURRENT USE OF INSULIN (H): ICD-10-CM

## 2025-08-11 DIAGNOSIS — I10 BENIGN ESSENTIAL HYPERTENSION: ICD-10-CM

## 2025-08-11 DIAGNOSIS — Z79.4 TYPE 2 DIABETES MELLITUS WITH HYPERGLYCEMIA, WITH LONG-TERM CURRENT USE OF INSULIN (H): ICD-10-CM

## 2025-08-11 PROCEDURE — 3079F DIAST BP 80-89 MM HG: CPT | Performed by: PHARMACIST

## 2025-08-11 PROCEDURE — 99606 MTMS BY PHARM EST 15 MIN: CPT | Performed by: PHARMACIST

## 2025-08-11 PROCEDURE — 3074F SYST BP LT 130 MM HG: CPT | Performed by: PHARMACIST

## 2025-08-11 RX ORDER — LISINOPRIL 40 MG/1
40 TABLET ORAL DAILY
Qty: 90 TABLET | Refills: 1 | Status: SHIPPED | OUTPATIENT
Start: 2025-08-11